# Patient Record
Sex: FEMALE | Race: BLACK OR AFRICAN AMERICAN | Employment: FULL TIME | URBAN - METROPOLITAN AREA
[De-identification: names, ages, dates, MRNs, and addresses within clinical notes are randomized per-mention and may not be internally consistent; named-entity substitution may affect disease eponyms.]

---

## 2019-01-17 ENCOUNTER — OFFICE VISIT (OUTPATIENT)
Dept: FAMILY MEDICINE | Facility: CLINIC | Age: 29
End: 2019-01-17
Payer: COMMERCIAL

## 2019-01-17 VITALS
WEIGHT: 141 LBS | OXYGEN SATURATION: 92 % | DIASTOLIC BLOOD PRESSURE: 76 MMHG | HEART RATE: 131 BPM | TEMPERATURE: 99 F | RESPIRATION RATE: 16 BRPM | SYSTOLIC BLOOD PRESSURE: 119 MMHG | BODY MASS INDEX: 22.08 KG/M2

## 2019-01-17 DIAGNOSIS — J45.41 MODERATE PERSISTENT ASTHMA WITH ACUTE EXACERBATION: ICD-10-CM

## 2019-01-17 DIAGNOSIS — J45.40 MODERATE PERSISTENT ASTHMA, UNSPECIFIED WHETHER COMPLICATED: Primary | ICD-10-CM

## 2019-01-17 DIAGNOSIS — B00.2 ORAL HERPES: ICD-10-CM

## 2019-01-17 RX ORDER — MEDROXYPROGESTERONE ACETATE 150 MG/ML
150 INJECTION, SUSPENSION INTRAMUSCULAR
COMMUNITY
Start: 2018-11-16 | End: 2020-10-04

## 2019-01-17 RX ORDER — MONTELUKAST SODIUM 10 MG/1
10 TABLET ORAL AT BEDTIME
Qty: 90 TABLET | Refills: 3 | Status: SHIPPED | OUTPATIENT
Start: 2019-01-17 | End: 2020-05-11

## 2019-01-17 RX ORDER — VALACYCLOVIR HYDROCHLORIDE 500 MG/1
500 TABLET, FILM COATED ORAL 2 TIMES DAILY
Qty: 30 TABLET | Refills: 0 | Status: SHIPPED | OUTPATIENT
Start: 2019-01-17 | End: 2019-12-06

## 2019-01-17 RX ORDER — ACETAMINOPHEN 500 MG
1000 TABLET ORAL EVERY 6 HOURS PRN
COMMUNITY
Start: 2013-03-25

## 2019-01-17 RX ORDER — ALBUTEROL SULFATE 0.83 MG/ML
2.5 SOLUTION RESPIRATORY (INHALATION)
COMMUNITY
Start: 2018-11-01 | End: 2019-12-06

## 2019-01-17 RX ORDER — ALBUTEROL SULFATE 90 UG/1
2 AEROSOL, METERED RESPIRATORY (INHALATION) EVERY 4 HOURS PRN
Qty: 2 INHALER | Refills: 1 | Status: SHIPPED | OUTPATIENT
Start: 2019-01-17 | End: 2019-12-06

## 2019-01-17 NOTE — PATIENT INSTRUCTIONS
refill on albuterol  Start new asthma medications:    QVAR inhaler, 1 puff morning and night everyday  Singulair, pill, 1 pill daily at night.      Use albuterol as you need    Prescription for filter for neb machine.     Refills on the Valcyclovir    Follow up in 6 weeks, but CALL if breathing not getting better with new medicines.

## 2019-01-18 PROBLEM — B00.2 ORAL HERPES: Status: ACTIVE | Noted: 2019-01-18

## 2019-01-18 PROBLEM — J45.41 MODERATE PERSISTENT ASTHMA WITH ACUTE EXACERBATION: Status: ACTIVE | Noted: 2019-01-18

## 2019-01-18 NOTE — PROGRESS NOTES
There are no exam notes on file for this visit.    SUBJECTIVE  Donna Larose is a 28 year old female with past medical history significant for    Patient Active Problem List   Diagnosis     OCP (oral contraceptive pills) initiation     Lung nodule     Pelvic pain in female     Migraine     Moderate persistent asthma with acute exacerbation     Oral herpes     Others present at the visit:  None    Presents for   Chief Complaint   Patient presents with     Establish Care     Pt is here to establish care today,     Asthma     Pt is here to discuss asthma.     Medication Reconciliation     Complete.      Patient presents today to establish care.  She has a past medical history of asthma, but is only ever taking albuterol for this.  She also has a history of oral herpes and is taken valacyclovir for outbreaks and for prophylaxis in the past.  She has 3 children ages 5 3 and 1, and is quite busy with them at home.  Shares that things have been challenging lately because her father of her boys has been trying to hold him out of .  She has full custody at this time.  She is worried that he might try to take them away from her.  Is working with a  on this and they are working to get a restraining order in place currently.  Understandably, is very stressed about the situation.    She reports a past medical history of asthma, previous STDs, and a family history of asthma, high blood pressure, depression and anxiety.    Review of systems is positive for muscle pain, intermittent nausea vomiting, fevers or chills, dizziness, wheezing, bad cough, and difficulty breathing.  We chose to focus on her asthma today.    She reports every day significant asthma symptoms.  Uses up to 5-6 puffs of the albuterol at a time in order to get improvement.  Also has an albuterol neb machine and finds this works better.  Has never been on a controller medication.  Used to have asthma trouble 1-2 times a year.  Triggers include  cold air, and she does think it is worse sometimes with stress.  Right now she feels wheezy, cannot catch her breath, and has been coughing significantly.  This is been bad enough that she is vomited.  Was able to get a new filter for her nebulizer machine as it was acting funny, and this is helped.  She does also endorse chronic allergy and sinus symptoms.  Does not taking medication for this.  Has tried using Benadryl in the past but this just made her sleepy.  Has never had to go to the ER or be hospitalized for her asthma.  She has been using her daughter's inhaler to help until she can get a new prescription.    Also was recently seen for an outbreak of oral herpes.  Was given 6 pills of Valtrex to take 2 pills a day for 3 days.  She is wondering if this is enough of the medication.  Has been on Valtrex in the past and often took this on a regular basis for couple of months.    OBJECTIVE:  Vitals: /76   Pulse 131   Temp 99  F (37.2  C) (Oral)   Resp 16   Wt 64 kg (141 lb)   SpO2 92%   BMI 22.08 kg/m    BMI= Body mass index is 22.08 kg/m .  Vitals:  Vitals are reviewed and are within the normal range.  Blood pressure initially was elevated.  This improved with rest.  Gen:  Alert, pleasant, no acute distress  Head:  Normal cephalic, atraumatic.  No visual herpes lesions.  Ears:  Tympanic membranes viewed bilaterally, no erythema, bulging, or fluid present  Sinus: Mild congestion bilaterally.  No frontal or maxillary sinus tenderness.  Throat:  Clear.  Non-erythematous and without exudate  Neck:  No cervical lymphadenopathy  Cardiac:  Regular rate and rhythm, no murmurs, rubs or gallops  Respiratory: Diminished air sounds with wheezes present especially on expiration.  Bilaterally in all fields.  Some scattered rhonchi as well.  Skin:  Mucous membranes moist.  No rash.   Capillary refill <2 secs.      ASSESSMENT AND PLAN:      Donna was seen today for establish care, asthma and medication  reconciliation.  New patient here to establish care.  She is having worsening asthma symptoms.  I do not think this is bad enough to require oral steroids but would like to start her on controller medications.  We will do Qvar 1 puff twice daily every day.  Also will start Singulair 1 tab daily at night to help with the allergies as well as the asthma.  Refilled her albuterol inhaler and gave her a prescription for additional filters for the nebulizer machine.    I would like her to follow-up in 2-4 weeks to make sure that the breathing is improved.  Also requested that she bring family members and if she is reporting needing an inhaler for her daughter as well.  Will complete an asthma action plan and review medications at that time.    Diagnoses and all orders for this visit:    Moderate persistent asthma, unspecified whether complicated  -     albuterol (PROAIR HFA/PROVENTIL HFA/VENTOLIN HFA) 108 (90 Base) MCG/ACT inhaler; Inhale 2 puffs into the lungs every 4 hours as needed for shortness of breath / dyspnea or wheezing  -     valACYclovir (VALTREX) 500 MG tablet; Take 1 tablet (500 mg) by mouth 2 times daily for 3 days With episodes of oral herpes  -     beclomethasone HFA (QVAR REDIHALER) 80 MCG/ACT inhaler; Inhale 1 puff into the lungs 2 times daily  -     montelukast (SINGULAIR) 10 MG tablet; Take 1 tablet (10 mg) by mouth At Bedtime  -     order for DME; Equipment being ordered: Filter for nebulizer machine.  Qty:  2      Oral herpes  -     valACYclovir (VALTREX) 500 MG tablet; Take 1 tablet (500 mg) by mouth 2 times daily for 3 days With episodes of oral herpes      Patient Instructions    refill on albuterol  Start new asthma medications:    QVAR inhaler, 1 puff morning and night everyday  Singulair, pill, 1 pill daily at night.      Use albuterol as you need    Prescription for filter for neb machine.     Refills on the Valcyclovir    Follow up in 6 weeks, but CALL if breathing not getting better  with new medicines.      Cammie Bond

## 2019-12-06 ENCOUNTER — OFFICE VISIT (OUTPATIENT)
Dept: FAMILY MEDICINE | Facility: CLINIC | Age: 29
End: 2019-12-06
Payer: COMMERCIAL

## 2019-12-06 VITALS
HEIGHT: 67 IN | RESPIRATION RATE: 16 BRPM | HEART RATE: 84 BPM | BODY MASS INDEX: 24.64 KG/M2 | DIASTOLIC BLOOD PRESSURE: 89 MMHG | TEMPERATURE: 98 F | WEIGHT: 157 LBS | SYSTOLIC BLOOD PRESSURE: 123 MMHG | OXYGEN SATURATION: 97 %

## 2019-12-06 DIAGNOSIS — B00.2 ORAL HERPES: ICD-10-CM

## 2019-12-06 DIAGNOSIS — F32.A DEPRESSION, UNSPECIFIED DEPRESSION TYPE: ICD-10-CM

## 2019-12-06 DIAGNOSIS — J45.40 MODERATE PERSISTENT ASTHMA, UNSPECIFIED WHETHER COMPLICATED: Primary | ICD-10-CM

## 2019-12-06 RX ORDER — QUETIAPINE FUMARATE 50 MG/1
50 TABLET, FILM COATED ORAL AT BEDTIME
Qty: 30 TABLET | Refills: 0 | Status: SHIPPED | OUTPATIENT
Start: 2019-12-06 | End: 2020-03-05

## 2019-12-06 RX ORDER — ALBUTEROL SULFATE 90 UG/1
2 AEROSOL, METERED RESPIRATORY (INHALATION) EVERY 4 HOURS PRN
Qty: 2 INHALER | Refills: 1 | Status: SHIPPED | OUTPATIENT
Start: 2019-12-06 | End: 2019-12-06

## 2019-12-06 RX ORDER — ALBUTEROL SULFATE 90 UG/1
2 AEROSOL, METERED RESPIRATORY (INHALATION) EVERY 4 HOURS PRN
Qty: 2 INHALER | Refills: 1 | Status: SHIPPED | OUTPATIENT
Start: 2019-12-06 | End: 2020-04-18

## 2019-12-06 RX ORDER — VALACYCLOVIR HYDROCHLORIDE 500 MG/1
500 TABLET, FILM COATED ORAL 2 TIMES DAILY
COMMUNITY
End: 2020-04-21

## 2019-12-06 RX ORDER — ALBUTEROL SULFATE 0.83 MG/ML
2.5 SOLUTION RESPIRATORY (INHALATION) EVERY 4 HOURS PRN
Qty: 30 VIAL | Refills: 3 | Status: SHIPPED | OUTPATIENT
Start: 2019-12-06 | End: 2020-04-21

## 2019-12-06 RX ORDER — VALACYCLOVIR HYDROCHLORIDE 500 MG/1
TABLET, FILM COATED ORAL
Qty: 30 TABLET | Refills: 0 | Status: SHIPPED | OUTPATIENT
Start: 2019-12-06 | End: 2020-03-04

## 2019-12-06 ASSESSMENT — MIFFLIN-ST. JEOR: SCORE: 1478.65

## 2019-12-06 NOTE — PROGRESS NOTES
[facility]         SUBJECTIVE   Donna Larose is a 29 year old female with a PMH of:  Patient Active Problem List   Diagnosis     OCP (oral contraceptive pills) initiation     Lung nodule     Pelvic pain in female     Migraine     Moderate persistent asthma with acute exacerbation     Oral herpes     presenting to clinic today regarding concerns about her mood.  She states that she has been struggling with depressed mood.  She has a cat, which she considers a emotional support animal.  She states that her mood and the mood of all of her kids improves due to having the animal around.  She is not supposed to have a cat in her apartment without a note from a doctor.  She has received several notices from her [employer] regarding the cleanliness of her unit and the presence of the cat.  They will be reinspected her unit on Monday, 12/9, and she has at risk of eviction.    She also has cockroaches and mice in her unit, which exacerbate her asthma.  She has not seen any mice since getting the cat.  Her asthma is not well controlled.  She uses albuterol every 4 hours when at home.  On days that she is working, this results in 3 albuterol nebulizers per day.  On days that she is not working, she uses at least 4 albuterol nebs per day.    She is also asking for a refill of her valacyclovir.  She used to take valacyclovir at the onset of symptoms of oral cold sores.  Her OB/GYN transition her to daily prophylaxis.  She states that she gets roughly 3 outbreaks per year.  She states that these outbreaks only occur when she is under stress.    PMH, Medications and Allergies were reviewed and updated as needed.    ROS:  General: No fevers, chills  Head: No headache  Ears: No acute change in hearing.    CV: No chest pain or palpitations.  Resp: No shortness of breath.  No cough. No hemoptysis.  GI: No nausea, vomiting, constipation, diarrhea  : No urinary pains    Patient Active Problem List  "  Diagnosis     OCP (oral contraceptive pills) initiation     Lung nodule     Pelvic pain in female     Migraine     Moderate persistent asthma with acute exacerbation     Oral herpes       Current Outpatient Medications   Medication Sig Dispense Refill     albuterol (PROAIR HFA/PROVENTIL HFA/VENTOLIN HFA) 108 (90 Base) MCG/ACT inhaler Inhale 2 puffs into the lungs every 4 hours as needed for shortness of breath / dyspnea or wheezing 2 Inhaler 1     albuterol (PROVENTIL) (2.5 MG/3ML) 0.083% neb solution Inhale 2.5 mg into the lungs       beclomethasone HFA (QVAR REDIHALER) 80 MCG/ACT inhaler Inhale 1 puff into the lungs 2 times daily 3 Inhaler 1     valACYclovir (VALTREX) 500 MG tablet Take 500 mg by mouth 2 times daily       acetaminophen (TYLENOL) 500 MG tablet Take 1,000 mg by mouth       medroxyPROGESTERone (DEPO-PROVERA) 150 MG/ML syringe Inject 150 mg into the muscle       montelukast (SINGULAIR) 10 MG tablet Take 1 tablet (10 mg) by mouth At Bedtime (Patient not taking: Reported on 12/6/2019) 90 tablet 3     order for DME Equipment being ordered: Filter for nebulizer machine.  Qty:  2 2 Device 0     valACYclovir (VALTREX) 500 MG tablet Take 1 tablet (500 mg) by mouth 2 times daily for 3 days With episodes of oral herpes 30 tablet 0            OBJECTIVE:       Vitals:   Vitals:    12/06/19 0844   BP: 123/89   BP Location: Left arm   Pulse: 84   Resp: 16   Temp: 98  F (36.7  C)   TempSrc: Oral   SpO2: 97%   Weight: 71.2 kg (157 lb)   Height: 1.7 m (5' 6.93\")     BMI: Body mass index is 24.64 kg/m .    Gen:  Well nourished and in no acute distress  HEENT: Extraocular movement intact.  Neck: Supple without lymphadenopathy  CV:  RRR  - no murmurs noted   Pulm:  CTAB, occasional end expiratory wheeze, no crackles noted, good air entry   ABD: Soft, nontender, no masses, no rebound, BS intact throughout, no hepatosplenomegaly  Extrem: No cyanosis, edema or clubbing  Psych: Depressed mood    PHQ-9 score:    PHQ-9 " SCORE 12/10/2019   PHQ-9 Total Score 13     Positively endorsed 3 out of 4 questions related to possible sarina.          ASSESSMENT and PLAN:   Donna was seen today for depression.    Diagnoses and all orders for this visit:    Moderate persistent asthma, unspecified whether complicated  -     albuterol (PROVENTIL) (2.5 MG/3ML) 0.083% neb solution; Take 1 vial (2.5 mg) by nebulization every 4 hours as needed for shortness of breath / dyspnea or wheezing  -     Legal Services Referral - [place] only  -     mometasone furoate (ASMANEX HFA) 200 MCG/ACT inhaler; Inhale 2 puffs into the lungs 2 times daily  -     albuterol (PROAIR HFA/PROVENTIL HFA/VENTOLIN HFA) 108 (90 Base) MCG/ACT inhaler; Inhale 2 puffs into the lungs every 4 hours as needed for shortness of breath / dyspnea or wheezing    Depression, unspecified depression type  -     QUEtiapine (SEROQUEL) 50 MG tablet; Take 1 tablet (50 mg) by mouth At Bedtime    Oral herpes  -     valACYclovir (VALTREX) 500 MG tablet; For 3 days with episodes of oral herpes    Return to clinic in 1 week for follow up of asthma and depression. Return sooner if develops new or worsening symptoms.    Options for treatment and/or follow-up care were reviewed with the patient was actively involved in the decision making process. Patient verbalized understanding and was in agreement with the plan.    The patient was seen by and discussed with Kyree Carmichael MD.    Litzy Sosa MD PGY2

## 2019-12-06 NOTE — PROGRESS NOTES
Preceptor Attestation:   Patient seen, evaluated and discussed with the resident. I have verified the content of the note, which accurately reflects my assessment of the patient and the plan of care.   Supervising Physician:  Kyree Carmichael MD

## 2019-12-06 NOTE — PATIENT INSTRUCTIONS
-Start taking seroquel 50 mg at bedtime  -Start taking Asmanex 2 puffs twice daily. Stop taking Qvar.  -Follow-up in 1 week to follow-up on your asthma and depression.    My Asthma Action Plan  Name: Donna Larose  YOB: 1990  Date: 12/6/2019   My doctor: Cammie Bond   My clinic:   [facility] [address] [place] [address]  [phone]    My Asthma Severity: Moderate Persistent Avoid your asthma triggers: dust mites, insects/rodents, strong odors and fumes and cold air      GREEN ZONE   Good Control    I feel good    No cough or wheeze    Can work, sleep and play without asthma symptoms       Take your asthma control medicine every day.  Take the medications listed below daily.    Asmanex  200 mcg 2 puffs twice a day    1. If exercise triggers your asthma, take your rescue medication (2 puffs of albuterol, Ventolin/Pro-Air) 15 minutes before exercise or sports, and during exercise if you have asthma symptoms.  2. Spacer to use with inhaler: If you have a spacer, make sure to use it with your inhaler.              YELLOW ZONE Getting Worse  I have ANY of these:    I do not feel good    Cough or wheeze    Chest feels tight    Wake up at night   1. Keep taking your Green Zone medications.  2. Start taking your rescue medicine (1-2 puffs of albuterol - Ventolin/Pro-Air) every 4-6 hours as needed.  3. If symptoms are not controlled with above, can take 2 puffs every 20 minutes for up to 1 hour, then continue every 4 hours if needed.   4. If you do not return to the Green Zone in 12-24 hours or you get worse, call the clinic.         RED ZONE Medical Alert - Get Help  I have ANY of these:    I feel awful    Medicine is not helping    Breathing getting harder    Trouble walking or talking    Nose opens wide to breathe       1. Take your rescue medicine NOW (6-8 puffs of albuterol - Ventolin/Pro-Air) for every 20 minutes for up to 1 hour.  2. Call your doctor NOW.  3. If you are still in  the Red Zone after 20 minutes and you have not reached your doctor:    Take your rescue medicine again (6-8 puffs of albuterol - Ventolin/Pro-Air) and    Call 911 or go to the emergency room right away    See your regular doctor within 1 weeks of an Emergency Room or Urgent Care visit for follow-up treatment.        This Asthma Action Plan provides authorization for the administration of medication described in the AAP.  YES    Electronically signed by: Litzy Sosa MD    Annual Reminders:  Meet with Asthma Educator,  Flu Shot in the Fall, Pneumonia Shot  Pharmacy:    [facility] [id number] [place] [facility] - [place], [place] - [address] [id number] - [place], [place] - [address] AT [address]    December 6, 2019   Legal Services Referral - [facility] only  Legal referral has been sent to Beba Magana from [facility].     Scan/Send demographics and referral to [email or web address]    She will review, advise, and contact the patient.     Gabby Velazquez

## 2019-12-06 NOTE — LETTER
December 6, 2019      Donna Larose  [address]  [place] [address]          To whom it may concern:    Donna Larose has depression and has a cat as a  animal to help with her symptoms.    If you have any questions, please call the clinic.          Sincerely,      Litzy Sosa MD

## 2019-12-10 ASSESSMENT — PATIENT HEALTH QUESTIONNAIRE - PHQ9: SUM OF ALL RESPONSES TO PHQ QUESTIONS 1-9: 13

## 2019-12-17 ENCOUNTER — DOCUMENTATION ONLY (OUTPATIENT)
Dept: PHARMACY | Facility: PHYSICIAN GROUP | Age: 29
End: 2019-12-17

## 2019-12-17 DIAGNOSIS — J45.41 MODERATE PERSISTENT ASTHMA WITH ACUTE EXACERBATION: Primary | ICD-10-CM

## 2019-12-17 NOTE — PROGRESS NOTES
Pharmacy Note    Received fax that Asmanex is not covered.  Pt is on a high dose ICS. A med dose ICS/LABA would be more in line with guidelines.  Recommend switch to Dulera med dose. Discussed with Dr. Almaraz, who agrees. Sent Rx to pharmacy for Dulera.    Su Conteh, Pharm.D.

## 2020-02-29 DIAGNOSIS — F32.A DEPRESSION, UNSPECIFIED DEPRESSION TYPE: ICD-10-CM

## 2020-03-05 RX ORDER — QUETIAPINE FUMARATE 50 MG/1
50 TABLET, FILM COATED ORAL AT BEDTIME
Qty: 30 TABLET | Refills: 0 | Status: SHIPPED | OUTPATIENT
Start: 2020-03-05 | End: 2020-04-29

## 2020-04-18 ENCOUNTER — TELEPHONE (OUTPATIENT)
Dept: FAMILY MEDICINE | Facility: CLINIC | Age: 30
End: 2020-04-18

## 2020-04-18 DIAGNOSIS — J45.40 MODERATE PERSISTENT ASTHMA, UNSPECIFIED WHETHER COMPLICATED: ICD-10-CM

## 2020-04-18 RX ORDER — ALBUTEROL SULFATE 90 UG/1
2 AEROSOL, METERED RESPIRATORY (INHALATION) EVERY 4 HOURS PRN
Qty: 2 INHALER | Refills: 1 | Status: SHIPPED | OUTPATIENT
Start: 2020-04-18 | End: 2020-04-21

## 2020-04-18 RX ORDER — ALBUTEROL SULFATE 90 UG/1
2 AEROSOL, METERED RESPIRATORY (INHALATION) EVERY 4 HOURS PRN
Qty: 2 INHALER | Refills: 1 | Status: SHIPPED | OUTPATIENT
Start: 2020-04-18 | End: 2020-04-18

## 2020-04-18 NOTE — TELEPHONE ENCOUNTER
Answering Service Page     Donna is a 29 y.o with a history of asthma and recent hospitalization with a TOA who is calling with shortness of breath. Patient notes that she was sent home from work yesterday because her oxygen level was low at around 84%. Reports that this was after she took an albuterol neb. She doesn't have a pulse oximeter at home, but borrowed a co-workers yesterday who also has asthma. She said she went home, used her albuterol inhaler/nebulizer several times last night, but she still woke up several times at night with difficulty breathing and sweating. She has been waking up with sweats, but doesn't have a thermometer to check her temperature. She has a had a runny nose and cough. She notes that these are worse than normal for her, but chronic issues.  She does work at a [employer]. She reports she has had no known recent sick contacts though and has not recently traveled or been around people who have traveled; however, she was hospitalized approximately 2 weeks ago. She ran out of her albuterol inhaler yesterday, but she does still have her albuterol nebulizer. She has been taking her Dulera as prescribed. She last used her nebulizer around 4 hours ago. She feels she is gasping for air more frequently for the last 3-4 days. Patient is able to speak in complete sentences on the phone, but she reports that she feels short of breath while talking. She does sound congested. She does not have a prescription for prednisone at home and this has never been prescribed in the past.     Will refill her albuterol inhaler, but discussed that there is a shortage, so alternative inhaler may need to be sent instead.     Recommended that patient be evaluated this morning due to hypoxia yesterday even after using a nebulizer at work and waking up numerous times overnight with shortness of breath. COVID-19 cannot be ruled out at this point. Patient states she needs to try to find someone to watch her children.  Due to symptoms patient needs to be evaluated, but she is speaking in complete sentences on the phone currently, so recommended that she should do another neb now and try to find someone to watch her children this morning as soon as possible, but that if her shortness of breath worsens and she feels she can't breathe, she will need to call 911 right away. She voiced understanding and agreement with this. Patient will also need asthma follow up visit in clinic.       Ya Gallegos MD, PGY2

## 2020-04-20 NOTE — TELEPHONE ENCOUNTER
Patient with multifocal pneumonia in ED on 4/18 consistent with COVID-19. Discharged from with instructions to follow up with PCP.     Spoke with patient, she continues to take her medication and is feeling a little better. Her shortness of breath has improved. She is agreeable to scheduling follow up visit this week, transferred to  for scheduling. ./LR

## 2020-04-21 ENCOUNTER — VIRTUAL VISIT (OUTPATIENT)
Dept: FAMILY MEDICINE | Facility: CLINIC | Age: 30
End: 2020-04-21
Payer: COMMERCIAL

## 2020-04-21 VITALS — WEIGHT: 155 LBS | BODY MASS INDEX: 24.91 KG/M2 | HEIGHT: 66 IN

## 2020-04-21 DIAGNOSIS — J18.9 ATYPICAL PNEUMONIA: Primary | ICD-10-CM

## 2020-04-21 DIAGNOSIS — B00.2 RECURRENT ORAL HERPES SIMPLEX: ICD-10-CM

## 2020-04-21 DIAGNOSIS — J45.40 MODERATE PERSISTENT ASTHMA, UNSPECIFIED WHETHER COMPLICATED: ICD-10-CM

## 2020-04-21 RX ORDER — ALBUTEROL SULFATE 90 UG/1
2 AEROSOL, METERED RESPIRATORY (INHALATION) EVERY 4 HOURS PRN
Qty: 2 INHALER | Refills: 1 | Status: SHIPPED | OUTPATIENT
Start: 2020-04-21 | End: 2020-05-11

## 2020-04-21 RX ORDER — ALBUTEROL SULFATE 0.83 MG/ML
2.5 SOLUTION RESPIRATORY (INHALATION) EVERY 4 HOURS PRN
Qty: 30 VIAL | Refills: 3 | Status: SHIPPED | OUTPATIENT
Start: 2020-04-21 | End: 2020-10-06

## 2020-04-21 RX ORDER — VALACYCLOVIR HYDROCHLORIDE 500 MG/1
500 TABLET, FILM COATED ORAL 2 TIMES DAILY
Qty: 90 TABLET | Refills: 1 | Status: SHIPPED | OUTPATIENT
Start: 2020-04-21 | End: 2020-10-02

## 2020-04-21 ASSESSMENT — MIFFLIN-ST. JEOR: SCORE: 1454.83

## 2020-04-21 NOTE — PROGRESS NOTES
"Family Medicine Telephone Visit Note         Telephone Visit Consent   Patient was verbally read the following and verbal consent was obtained.    \"This telephone visit will be conducted via a call between you and your physician/provider. We have found that certain health care needs can be provided without the need for a physical exam.  This service lets us provide the care you need with a short phone conversation.  If a prescription is necessary we can send it directly to your pharmacy.  If lab work is needed we can place an order for that and you can then stop by our lab to have the test done at a later time.    Telephone visits are billed at different rates depending on your insurance coverage. During this emergency period, for some insurers they may be billed the same as an in-person visit.  Please reach out to your insurance provider with any questions.    If during the course of the call the physician/provider feels a telephone visit is not appropriate, you will not be charged for this service.\"    Name person giving consent:  Patient   Date verbal consent given:  4/21/2020  Time verbal consent given:  1:51 PM    Chief Complaint   Patient presents with     other     was having SOB. Came back with negative covid-19 and had pneumonia     Refill Request     refill vijaya medication, valtrex            HPI   Patients name: Donna  Appointment start time:  1:52 PM    Patient is following up on a recent ED visit.  She developed worsening shortness of breath and cough for a few days prior to going to the ED on 4/18/20.  She was found to have a concerning CTA for pneumonia vs COVID19.  Was discharged on Augmentin x7 days.  Today, she was informed her COVID19 test was negative which she's very happy about.  States her symptoms have significantly improved and almost resolved.  Tolerating the Augmentin well, having some looser stools but not distressing.  Otherwise no fevers, chills, headache, chest pain, trouble " "breathing, productive cough, abdominal pain.  She was wheezy before and used up all of her Albuterol, but denies any current wheezing.  Hasn't needed any additional Albuterol for over a day but requesting a refill.  Also, thinks she could be having a flare of her oral herpes and requesting a refill of her Valtrex.      Current Outpatient Medications   Medication Sig Dispense Refill     acetaminophen (TYLENOL) 500 MG tablet Take 1,000 mg by mouth       albuterol (PROAIR HFA/PROVENTIL HFA/VENTOLIN HFA) 108 (90 Base) MCG/ACT inhaler Inhale 2 puffs into the lungs every 4 hours as needed for shortness of breath / dyspnea or wheezing 2 Inhaler 1     albuterol (PROVENTIL) (2.5 MG/3ML) 0.083% neb solution Take 1 vial (2.5 mg) by nebulization every 4 hours as needed for shortness of breath / dyspnea or wheezing 30 vial 3     mometasone-formoterol (DULERA) 100-5 MCG/ACT inhaler Inhale 2 puffs into the lungs 2 times daily 1 Inhaler 11     QUEtiapine (SEROQUEL) 50 MG tablet Take 1 tablet (50 mg) by mouth At Bedtime Please come in for an office visit 30 tablet 0     valACYclovir (VALTREX) 500 MG tablet Take 1 tablet (500 mg) by mouth 2 times daily 90 tablet 1     medroxyPROGESTERone (DEPO-PROVERA) 150 MG/ML syringe Inject 150 mg into the muscle       montelukast (SINGULAIR) 10 MG tablet Take 1 tablet (10 mg) by mouth At Bedtime (Patient not taking: Reported on 12/6/2019) 90 tablet 3     Allergies   Allergen Reactions     Morphine Anaphylaxis, Hives and Unknown     Mother states pt has never had but everyone in family that gets morphine they have problems with breathing  MOM and grandma both had anaphylxsis reaction so the patient would like to avoid.               Review of Systems:     See above.          Physical Exam:     Ht 1.676 m (5' 6\")   Wt 70.3 kg (155 lb)   LMP 04/08/2020   BMI 25.02 kg/m    Estimated body mass index is 25.02 kg/m  as calculated from the following:    Height as of this encounter: 1.676 m (5' 6\").    " "Weight as of this encounter: 70.3 kg (155 lb).    Exam:  Constitutional: healthy, alert and no distress  Psychiatric: mentation appears normal and affect normal/bright    COVID 19 (4/18/20): Negative     CTA Chest Imaging (4/18/20):   1.  No pulmonary embolus.    2.  Small groundglass densities in both lungs and mild focal consolidation in the right upper lobe. Commonly reported imaging features of (COVID-19) pneumonia are present. Influenza pneumonia and organizing pneumonia as can be seen in the setting of drug toxicity and connective tissue disease can causing a similar imaging pattern.    3. Multiple small bilateral lung nodules as described. Some of these are unchanged since 2017. There are 2 additional right lung nodules which are not previously imaged and measure up to 5 mm. These are likely benign inflammatory nodules.          Assessment and Plan   Donna was seen today for pneumonia and refill request.    Diagnoses and all orders for this visit:    Atypical pneumonia, resolving  COVID19 negative.  Patient feels significantly better since going to the ED and starting Augmentin.  On day 3, advised her to complete full 7 day course.  Denies any fever, chills or shortness of breath.  No other concerns.  Return precautions reviewed.  Noted to have unchanged and new small lung nodules on imaging that are \"likely benign inflammatory nodules\".      Moderate persistent asthma, unspecified whether complicated  Refilled.  No current wheezing and feeling much better.    -     albuterol (PROAIR HFA/PROVENTIL HFA/VENTOLIN HFA) 108 (90 Base) MCG/ACT inhaler; Inhale 2 puffs into the lungs every 4 hours as needed for shortness of breath / dyspnea or wheezing  -     albuterol (PROVENTIL) (2.5 MG/3ML) 0.083% neb solution; Take 1 vial (2.5 mg) by nebulization every 4 hours as needed for shortness of breath / dyspnea or wheezing    Recurrent oral herpes simplex  Refilled.  Feels like she's developing another outbreak.  Has " been on suppressive therapy which has been helpful.    -     valACYclovir (VALTREX) 500 MG tablet; Take 1 tablet (500 mg) by mouth 2 times daily    After Visit Information:  Patient declined AVS     No follow-ups on file.    Appointment end time: 2:12 PM  This is a telephone visit that took 20 minutes.    Clinician location:  [facility]    Ash Kerr MD  I precepted today with Dr. Canseco.

## 2020-04-21 NOTE — PROGRESS NOTES
I greeted the patient via telephone,  reviewed the telephone visit encounter and discussed the patient with the resident and agree with the resident s assessment and plan of care as documented.    Mauri Canseco MD

## 2020-05-06 ENCOUNTER — TELEPHONE (OUTPATIENT)
Dept: FAMILY MEDICINE | Facility: CLINIC | Age: 30
End: 2020-05-06

## 2020-05-06 NOTE — TELEPHONE ENCOUNTER
[facility] phone call message- general phone call:    Reason for call: She received a new albuterol sulfate   inhalation aerosol HFA she would like another one sent to her pharmacy [facility] on [address].  Action desired: call back.    Return call needed: Yes    OK to leave a message on voice mail? Yes    Advised patient to response may take up to 2 business days: Yes    Primary language: English      needed? No    Call taken on May 6, 2020 at 11:01 AM by Andrew Moncada

## 2020-05-11 ENCOUNTER — ALLIED HEALTH/NURSE VISIT (OUTPATIENT)
Dept: PHARMACY | Facility: PHYSICIAN GROUP | Age: 30
End: 2020-05-11
Payer: COMMERCIAL

## 2020-05-11 DIAGNOSIS — J45.40 MODERATE PERSISTENT ASTHMA, UNSPECIFIED WHETHER COMPLICATED: Primary | ICD-10-CM

## 2020-05-11 DIAGNOSIS — B00.2 ORAL HERPES: ICD-10-CM

## 2020-05-11 PROCEDURE — 99605 MTMS BY PHARM NP 15 MIN: CPT | Performed by: PHARMACIST

## 2020-05-11 PROCEDURE — 99607 MTMS BY PHARM ADDL 15 MIN: CPT | Performed by: PHARMACIST

## 2020-05-11 RX ORDER — ALBUTEROL SULFATE 90 UG/1
2 AEROSOL, METERED RESPIRATORY (INHALATION) EVERY 4 HOURS PRN
Qty: 2 INHALER | Refills: 4 | Status: SHIPPED | OUTPATIENT
Start: 2020-05-11 | End: 2020-08-19

## 2020-05-11 NOTE — PROGRESS NOTES
"MT ENCOUNTER  SUBJECTIVE/OBJECTIVE:                           Donna Larose is a 29 year old female called for an initial visit. She was referred to me from Dr Bond.      Patient consented to a telehealth visit: yes  Telemedicine Visit Details  Type of service:  Telephone visit  Start Time: 3:50pm  End Time: 4:10pm  Originating Location (pt. Location): Home  Distant Location (provider location):  [facility]  Mode of Communication:  Telephone    Chief Complaint: Needs refill of albuterol.    Allergies/ADRs: Reviewed in Epic  Tobacco:  reports that she has never smoked. She has never used smokeless tobacco.  Alcohol: none  PMH: Reviewed in Epic    Medication Adherence/Access: Confusion about medications has led to unintentional nonadherence.    Moderate persistent asthma: Current asthma medications: Short-Acting Bronchodilator: Albuterol MDI and nebs. Pt reports the following symptoms: increased need of albuterol- every day; usually about once a day  AAP on file: YES Dec 2019    We went over her inhalers. She needs a refill of her albuterol MDI. She uses this every day; usually about once per day. She also has albuterol nebs, which she rarely uses. She stated that she was not using the Dulera; she stopped it when she got the \"new yellow inhaler\". She also has a grey inhaler. I asked her to get her inhalers and read me the names of them. She then realized that the yellow and grey ones were both albuterol and she didn't realize that. She was using both the yellow and grey. She mistakenly stopped the Dulera as she thought she was supposed to use the yellow one instead of the Dulera.  She has montelukast on her list, which was prescribed over a year ago, and she said she doesn't think she ever used it and for if she did, it was a long time ago. It doesn't sound familiar to her.    Most Recent Immunizations   Administered Date(s) Administered     DTAP (<7y) 05/22/1997     HPV Quadrivalent " 06/01/2010     HepA-Adult 08/24/2009     HepA-ped 2 Dose 08/24/2009     HepB, Unspecified 10/30/1994     HepB-Adult 10/30/1994     Hib, Unspecified 05/08/1993     Historical DTP/aP 05/22/1997     Influenza (IIV3) PF 09/26/2013     MMR 05/22/1997     Meningococcal (Menactra ) 12/16/2008     Meningococcal,unspecified 12/16/2008     OPV, trivalent, live 05/22/1997     Pedvax-hib 05/08/1993     Poliovirus, inactivated (IPV) 05/22/1997     TDAP Vaccine (Adacel) 02/24/2017     Td (Adult), Adsorbed 07/09/2004       Oral herpes: On Valtrex for prophylaxis. No side effects.    Contraception: On Depo Provera (receives at [facility])    Depression: Taking Seroquel; no adverse effects    Today's Vitals: There were no vitals taken for this visit.      ASSESSMENT:                              Medication Adherence: good, confusion about her inhalers    Moderate persistent asthma: Not taking a controller x about a month due to a misunderstanding with her multiple different color albuterol inhalers. She said since they looked so different she didn't realize they were the same med.    Oral herpes: Stable    Contraception: Stable; on Depo Provera    Depression: Dd not discuss due to time      PLAN:                          Post Discharge Medication Reconciliation Status: discharge medications reconciled, continue medications without change. She had been to the ED on 4/18/20. No med changes had been made at that time.      START DULERA    Refilled albuterol inhaler    Educated pt on controller vs rescue and that albuterol is now generic and she may get different color inhalers in the future-always read the label. Made sure she understood to use the Dulera 2 puffs BID every day.      Medication issues to be addressed at a future visit      Pneumovax (PPSV 23) at next in person visit      I spent 20 minutes with this patient today. All changes were made via collaborative practice agreement with Dr. Bond. A copy  of the visit note was provided to the patient's primary care provider.    Will follow up in 2 days with Dr. Bond.    The patient was not given a summary of these recommendations.     Su Conteh, Pharm.D.

## 2020-05-13 ENCOUNTER — VIRTUAL VISIT (OUTPATIENT)
Dept: FAMILY MEDICINE | Facility: CLINIC | Age: 30
End: 2020-05-13
Payer: COMMERCIAL

## 2020-05-13 DIAGNOSIS — J45.41 MODERATE PERSISTENT ASTHMA WITH EXACERBATION: Primary | ICD-10-CM

## 2020-05-13 RX ORDER — LORATADINE 10 MG/1
10 TABLET ORAL DAILY
Qty: 90 TABLET | Refills: 3 | Status: SHIPPED | OUTPATIENT
Start: 2020-05-13 | End: 2020-08-19

## 2020-05-13 NOTE — PROGRESS NOTES
"Family Medicine Telephone Visit Note               Telephone Visit Consent   Patient was verbally read the following and verbal consent was obtained.    \"Telephone visits are billed at different rates depending on your insurance coverage. During this emergency period, for some insurers they may be billed the same as an in-person visit.  Please reach out to your insurance provider with any questions.  If during the course of the call the physician/provider feels a telephone visit is not appropriate, you will not be charged for this service.\"    Name person giving consent:  Patient   Date verbal consent given:  5/13/2020  Time verbal consent given:  1:46 PM           Chief Complaint   Patient presents with     RECHECK     follow up hosptal     other     Pt would like hndi cap sticker because she has SOB often.             HPI   Patients name: Donna  Appointment start time:  1:59 PM  Appointment end time:  2:17PM    Infection in her tubes, then had trouble with her asthma after that.  Start really feeling good about 1 week ago.  Before that was still having trouble.      Got confused about her medication.  But this is getting better.      When going to appointments has to park further down.      Can only walk two block.  Trouble going up and down stairs.  Body is feeling that way.  Only occasional gets short of breath.  Sometimes can take 15-20 min, but gets a lot better with the pump.      New pump is yellow.      Nebs help breathing too    2 puffs on the albuterol inhaler usually does the job.  Doesn't usually need more than that.      We reviewed and completed an asthma action plan together today.      Current Outpatient Medications   Medication Sig Dispense Refill     acetaminophen (TYLENOL) 500 MG tablet Take 1,000 mg by mouth       albuterol (PROAIR HFA/PROVENTIL HFA/VENTOLIN HFA) 108 (90 Base) MCG/ACT inhaler Inhale 2 puffs into the lungs every 4 hours as needed for shortness of breath / dyspnea or wheezing 2 " "Inhaler 4     albuterol (PROVENTIL) (2.5 MG/3ML) 0.083% neb solution Take 1 vial (2.5 mg) by nebulization every 4 hours as needed for shortness of breath / dyspnea or wheezing 30 vial 3     loratadine (CLARITIN) 10 MG tablet Take 1 tablet (10 mg) by mouth daily 90 tablet 3     mometasone-formoterol (DULERA) 100-5 MCG/ACT inhaler Inhale 2 puffs into the lungs 2 times daily 1 Inhaler 11     QUEtiapine (SEROQUEL) 50 MG tablet TAKE 1 TABLET BY MOUTH AT BEDTIME 90 tablet 0     valACYclovir (VALTREX) 500 MG tablet Take 1 tablet (500 mg) by mouth 2 times daily 90 tablet 1     medroxyPROGESTERone (DEPO-PROVERA) 150 MG/ML syringe Inject 150 mg into the muscle       Allergies   Allergen Reactions     Morphine Anaphylaxis, Hives and Unknown     Mother states pt has never had but everyone in family that gets morphine they have problems with breathing  MOM and grandma both had anaphylxsis reaction so the patient would like to avoid.               Physical Exam:     There were no vitals taken for this visit.  Estimated body mass index is 25.02 kg/m  as calculated from the following:    Height as of 4/21/20: 1.676 m (5' 6\").    Weight as of 4/21/20: 70.3 kg (155 lb).    Exam:  Constitutional: alert and no distress.  Breathing comfortable without distress over the phone    Psychiatric: mentation appears normal and affect normal/bright        Assessment and Plan   Donna was seen today for recheck and other.    Diagnoses and all orders for this visit:    Moderate persistent asthma with exacerbation.  Reviewed patient's medications.  She is only taking 1 puff twice daily of Dulera, so she will increase this up to 2 puffs twice daily.  Good understanding of her albuterol and uses her nebs when needed.  Has nebs at home and at work.  We also discussed her request for a handicap parking form.  She gets quite short of breath with just watch and walking 1-2 blocks.  I am concerned about this that her asthma is not well controlled, and we " discussed this.  Did complete the form for short-term, but I anticipate she should be able to walk normal distances going forward and that we will not need to complete this.  I would like her to have pulmonary function testing done once things are safe for that with COVID.  Has allergies to contribute to her asthma, so did prescribe Claritin.  Completed an asthma action plan today.  -     loratadine (CLARITIN) 10 MG tablet; Take 1 tablet (10 mg) by mouth daily      Refilled medications that would be required in the next 3 months.     After Visit Information:  Patient chose to view AVS via [employer]    Follow-up in 1 month to recheck breathing.    Appointment end time: 2:17 PM  This is a telephone visit that took 18 minutes.      Clinician location:  [facility]    Cammie Bond MD

## 2020-05-13 NOTE — PATIENT INSTRUCTIONS
My Asthma Action Plan  Name: Donna Larose  YOB: 1990  Date: 5/13/2020   My doctor: Cammie Bond   My clinic:   [facility] [address] [place] [address]  [phone]    My Asthma Severity: Moderate Persistent Avoid your asthma triggers: dust mites, pollens, mold, strong odors and fumes, occupational exposure, exercise or sports and walking, doing too much at one time, jogging, exercise      GREEN ZONE   Good Control    I feel good    No cough or wheeze    Can work, sleep and play without asthma symptoms       Take your asthma control medicine every day.  Take the medications listed below daily.    Dulera  200/5 mcg 2 puffs twice a day  Claritin daily when available    1. If exercise triggers your asthma, take your rescue medication (2 puffs of albuterol, Ventolin/Pro-Air) 15 minutes before exercise or sports, and during exercise if you have asthma symptoms.  2. Spacer to use with inhaler: If you have a spacer, make sure to use it with your inhaler.              YELLOW ZONE Getting Worse  I have ANY of these:    I do not feel good    Cough or wheeze    Chest feels tight    Wake up at night   1. Keep taking your Green Zone medications.  2. Start taking your rescue medicine (1-2 puffs of albuterol - Ventolin/Pro-Air) every 4-6 hours as needed.  3. If symptoms are not controlled with above, can take 2 puffs every 20 minutes for up to 1 hour, then continue every 4 hours if needed.   4. If you do not return to the Green Zone in 12-24 hours or you get worse, call the clinic.         RED ZONE Medical Alert - Get Help  I have ANY of these:    I feel awful    Medicine is not helping    Breathing getting harder    Trouble walking or talking    Nose opens wide to breathe       1. Take your rescue medicine NOW (6-8 puffs of albuterol - Ventolin/Pro-Air) for every 20 minutes for up to 1 hour.  2. Call your doctor NOW.  3. If you are still in the Red Zone after 20 minutes and you have not  reached your doctor:    Take your rescue medicine again (6-8 puffs of albuterol - Ventolin/Pro-Air) and    Call 911 or go to the emergency room right away    See your regular doctor within 1 weeks of an Emergency Room or Urgent Care visit for follow-up treatment.        This Asthma Action Plan provides authorization for the administration of medication described in the AAP.  YES  This child has the knowledge and skills to self-administer rescue medication at school or  with approval of the school nurse.  YES    Electronically signed by: Cammie Bond MD    Annual Reminders:  Meet with Asthma Educator,  Flu Shot in the Fall, Pneumonia Shot  Pharmacy:    [facility] [id number] [place] [facility] [place] [place] - [address] [id number] - [place], [place] - [address]

## 2020-05-14 ENCOUNTER — DOCUMENTATION ONLY (OUTPATIENT)
Dept: FAMILY MEDICINE | Facility: CLINIC | Age: 30
End: 2020-05-14

## 2020-05-14 ASSESSMENT — ASTHMA QUESTIONNAIRES: ACT_TOTALSCORE: 10

## 2020-05-14 NOTE — PROGRESS NOTES
To be completed in Nursing note:    Please reference list for forms that require a visit for completion.  Please remind patients that providers are given 3-5 business days to complete and return forms.      Form type: Medical Statement    Date form received: 5/14/2020    Date form completed by Physician: 5/18/2020    How was form returned to patient (mailed, faxed, or at  for patient to ): Fax to [employer] at [phone]    Date form mailed/faxed/left at  for patient and sent to HIM for scanning: Faxed 5/15/2020 at 2:42pm      Once form is left for patient, faxed, or mailed PCS will then close the documentation only encounter.

## 2020-05-19 ENCOUNTER — TELEPHONE (OUTPATIENT)
Dept: FAMILY MEDICINE | Facility: CLINIC | Age: 30
End: 2020-05-19

## 2020-05-19 NOTE — TELEPHONE ENCOUNTER
[facility] phone call message- general phone call:    Reason for call: She needs a all back re her handy cap parking paperwork she needs some help as to when this needs to go and she is wondering if we can just fax it to ware it needs to go    Action desired: call back.    Return call needed: Yes    OK to leave a message on voice mail? Yes    Advised patient to response may take up to 2 business days: Yes    Primary language: English      needed? No    Call taken on May 19, 2020 at 9:50 AM by Andrew Moncada

## 2020-05-20 NOTE — TELEPHONE ENCOUNTER
This SW returned the call to . She states that she has gone to the DMV but they refuse to take the application as they tell her she's at the wrong location. She is really busy and doesn't have time to run around and figure out the correct office to submit the form. She has completed her portion and PCP has completed their portion.     GURU welcomed patient to fax it to them and they will mail it for her or offered her the direct mailing address as listed on the Department of Public Safety website. She opted to mail it on her own.     SW provided patient with the following mailing address to mail the form:    [place] [employer]   and [employer]  [address] [place] [id number]      GERSON Schafer

## 2020-06-29 ENCOUNTER — TRANSFERRED RECORDS (OUTPATIENT)
Dept: HEALTH INFORMATION MANAGEMENT | Facility: CLINIC | Age: 30
End: 2020-06-29

## 2020-08-03 PROBLEM — L21.9 SEBORRHEIC DERMATITIS: Status: ACTIVE | Noted: 2020-08-03

## 2020-08-19 ENCOUNTER — OFFICE VISIT (OUTPATIENT)
Dept: FAMILY MEDICINE | Facility: CLINIC | Age: 30
End: 2020-08-19
Payer: COMMERCIAL

## 2020-08-19 ENCOUNTER — TELEPHONE (OUTPATIENT)
Dept: FAMILY MEDICINE | Facility: CLINIC | Age: 30
End: 2020-08-19

## 2020-08-19 VITALS
TEMPERATURE: 98 F | RESPIRATION RATE: 16 BRPM | OXYGEN SATURATION: 96 % | WEIGHT: 177.2 LBS | DIASTOLIC BLOOD PRESSURE: 84 MMHG | SYSTOLIC BLOOD PRESSURE: 125 MMHG | HEART RATE: 100 BPM | BODY MASS INDEX: 28.6 KG/M2

## 2020-08-19 DIAGNOSIS — Z72.51 UNPROTECTED SEX: ICD-10-CM

## 2020-08-19 DIAGNOSIS — J45.41 MODERATE PERSISTENT ASTHMA WITH ACUTE EXACERBATION: ICD-10-CM

## 2020-08-19 DIAGNOSIS — J45.40 MODERATE PERSISTENT ASTHMA, UNSPECIFIED WHETHER COMPLICATED: ICD-10-CM

## 2020-08-19 DIAGNOSIS — N93.9 ABNORMAL VAGINAL BLEEDING: Primary | ICD-10-CM

## 2020-08-19 DIAGNOSIS — R39.15 URINARY URGENCY: ICD-10-CM

## 2020-08-19 LAB
BACTERIA: NORMAL
BILIRUBIN UR: NEGATIVE MG/DL
BLOOD UR: ABNORMAL MG/DL
CASTS: NORMAL /LPF
CRYSTAL URINE: NORMAL /LPF
EPITHELIAL CELLS UR: NORMAL /LPF (ref 0–2)
GLUCOSE URINE: NEGATIVE
HCG UR QL: NEGATIVE
KETONES UR QL: NEGATIVE MG/DL
LEUKOCYTE ESTERASE UR: ABNORMAL
MUCOUS URINE: NORMAL LPF
NITRITE UR QL STRIP: NEGATIVE MG/DL
PH UR STRIP: 7 [PH] (ref 4.5–8)
PROTEIN UR: ABNORMAL MG/DL
RBC URINE: <2 /HPF
SP GR UR STRIP: 1.02 (ref 1–1.03)
UROBILINOGEN UR STRIP-ACNC: ABNORMAL E.U./DL
WBC URINE: NORMAL /HPF

## 2020-08-19 RX ORDER — ALBUTEROL SULFATE 90 UG/1
2 AEROSOL, METERED RESPIRATORY (INHALATION) EVERY 4 HOURS PRN
Qty: 2 INHALER | Refills: 4 | Status: SHIPPED | OUTPATIENT
Start: 2020-08-19 | End: 2020-10-02

## 2020-08-19 NOTE — PROGRESS NOTES
HPI     Donna Larose is a 30 year old  female with a PMH significant for:     Patient Active Problem List   Diagnosis     OCP (oral contraceptive pills) initiation     Lung nodule     Pelvic pain in female     Migraine     Moderate persistent asthma with acute exacerbation     Oral herpes     Seborrheic dermatitis     She presents today for evaluation of vaginal spotting.    Donna has had spotting between her regular menstrual cycles.  She first noticed this after she had a normal menstrual period from July through July 12.  She then experienced some light spotting from July 20-22.  She only noticed this when wiping and did not have to use a tampon or pad.  She did not have any cramping with this spotting nor changes in her abdomen girth or pain with sexual intercourse.  She has not been on any form of oral contraception since her Depo-Provera shots but it appears she was last due in April and did not go in for this at this time so it has been almost 6 months.  She then had a normal menstrual period from August 5 August 9.  She is very regular and has a normal amount of bleeding.  She does experience cramps with her menstrual cycle.  She then experienced some spotting yesterday and today (8/18 to 8/19) which prompted her to come to clinic for evaluation.     She has been having on percent protected intercourse right now and is concerned for pregnancy today even in the setting of her regular periods.  She does not have concern for sexually transmitted infections but would also like to be tested today.  She has a history of gonorrhea chlamydia infection and her last normal Pap smear was in 2018.  She does not have a family history of fibroids or any type of ovarian or uterine pathology.    She is also had 1 to 2-day history of urinary urgency.  She states that she suddenly has to go to the bathroom really bad but then when she gets to the restroom she can barely void and it hurts.  She has not had any  fevers or abdominal cramping, as above.  Intermittently nauseous.    Also requesting refills on inhalers today. Is still SOB but this is at her baseline. She is being very cautious in setting of COVID-19 as she knows she has sensitive lungs. Feels intermittently wheezy.     PMH, Medications and Allergies were reviewed and updated as needed.      Current Outpatient Medications:      acetaminophen (TYLENOL) 500 MG tablet, Take 1,000 mg by mouth, Disp: , Rfl:      albuterol (PROAIR HFA/PROVENTIL HFA/VENTOLIN HFA) 108 (90 Base) MCG/ACT inhaler, Inhale 2 puffs into the lungs every 4 hours as needed for shortness of breath / dyspnea or wheezing, Disp: 2 Inhaler, Rfl: 4     albuterol (PROVENTIL) (2.5 MG/3ML) 0.083% neb solution, Take 1 vial (2.5 mg) by nebulization every 4 hours as needed for shortness of breath / dyspnea or wheezing, Disp: 30 vial, Rfl: 3     mometasone-formoterol (DULERA) 100-5 MCG/ACT inhaler, Inhale 2 puffs into the lungs 2 times daily, Disp: 1 Inhaler, Rfl: 11     QUEtiapine (SEROQUEL) 50 MG tablet, TAKE 1 TABLET BY MOUTH AT BEDTIME, Disp: 90 tablet, Rfl: 0     valACYclovir (VALTREX) 500 MG tablet, Take 1 tablet (500 mg) by mouth 2 times daily, Disp: 90 tablet, Rfl: 1     loratadine (CLARITIN) 10 MG tablet, Take 1 tablet (10 mg) by mouth daily (Patient not taking: Reported on 8/19/2020), Disp: 90 tablet, Rfl: 3     medroxyPROGESTERone (DEPO-PROVERA) 150 MG/ML syringe, Inject 150 mg into the muscle, Disp: , Rfl:     Review Of Systems  Skin: No new rashes or lesions  Eyes: No new changes in vision, no conjunctival irritation or erythema  Ears/Nose/Throat: No rhinorrhea or nasal congestion, no throat pain  Respiratory: Shortness of breath still present secondary to asthma, unchanged from baseline  Cardiovascular: No chest pain or heart palpitations  Gastrointestinal: see HPI  Genitourinary: see HPI  Musculoskeletal: No joint edema, arthralgias or myalgias   Neurologic: No headache, tremor, numbness or  weakness  Psychiatric: Moods have been stable          Physical Exam:     Vitals:    08/19/20 0843   BP: 125/84   Pulse: 100   Resp: 16   Temp: 98  F (36.7  C)   TempSrc: Oral   SpO2: 96%   Weight: 80.4 kg (177 lb 3.2 oz)     Body mass index is 28.6 kg/m .    EXAM:  Constitutional: Healthy, alert and no distress  Neck: Neck supple. No adenopathy. Thyroid symmetric, normal size  Eyes: PERRLA, EOMI, conjunctiva are clear  ENT: No nasal discharge. TMs clear, Oropharynx clear with no erythema or exudates  Cardiovascular: Regular rate and rhythm. No murmurs, clicks gallops or rub  Respiratory: Normal respiratory rate and rhythm, lungs with faint end expiraotry wheeze  Abdomen:  +BS, soft, nontender, nondistended. No HSM. No suprapubic tenderness  Extremities: No C/C/E in BLE. 2+DP pulses.  Joint exam without erythema, effusion and full ROM throughout  Skin: No rashes or lesion on exposed skin  Psychiatric: Mentation appears normal and affect normal/bright      Assessment and Plan     Abnormal vaginal bleeding  At this time I suspect her vaginal spotting is secondary to her ovulation phase and her menstrual cycle as it is been consistently 2 weeks after her menstrual cycle.  She does not have any cramping with this, the bleeding is very light and she only notices it when wiping.  She has a normal Pap smear in 2018 so I am less concerned about some type of malignancy especially in absence of other symptoms, no family history of fibroids and she has had normal regular periods and this bleeding is very light.  Does have a history of some sexually transmitted infections and an acute history of urinary urgency but I do not think that this would fit her history as this is been going on for 2 months.  At this time we will obtain a urinalysis and STI testing.  If these are normal along with a UPT I recommend continued watchful waiting and tracking with her cell phone tomás as she has been doing.  She is interested in resuming oral  contraceptive therapy versus ductal therapy and I imagine this will help regulate her cycle again once she resumes this.  - Urinalysis, Micro If ([facility])  - [facility])    Unprotected sex  Patient would like UPT today as she has been having regular unprotected sex. Is perhaps interested in restarting Depo shots. Discussed clinic policy of 2 negative UPTs with abstinence/condom use between. Patient agreeable and will schedule follow up if she decides to pursue this.  - HCG Qualitative Urine (UPT)  ([facility])    Urinary urgency  - Urinalysis, Micro If ([facility])     Moderate persistent asthma with acute exacerbation  - mometasone-formoterol (DULERA) 100-5 MCG/ACT inhaler; Inhale 2 puffs into the lungs 2 times daily  Dispense: 1 Inhaler; Refill: 11    Moderate persistent asthma, unspecified whether complicated  - albuterol (PROAIR HFA/PROVENTIL HFA/VENTOLIN HFA) 108 (90 Base) MCG/ACT inhaler; Inhale 2 puffs into the lungs every 4 hours as needed for shortness of breath / dyspnea or wheezing  Dispense: 2 Inhaler; Refill: 4      RTC for routine health maintenance in the next 1-2 months or sooner if new or worsening symptoms.    Discussed with Dr. Latonya Anderson MD who is agreement with the assessment and plan.    Huey Arroyo MD PGY 2  [facility]      Patient Instructions   Normal menstrual bleeding lasts about five to seven days. Normal periods usually occur every 4 weeks, or 28 - 30 days. About 14 days after the start of your period, you ovulate and release an egg from the ovary. This spotting can last for one to two days and is typically light bleeding.  I recommend keeping a journal of your spotting for the next 3 months as you are already doing.    I will let you know what the test results show.    Make an appointment if/when you are ready to discuss birth control or would like to start Depo again. We need 2 pregnancy tests two weeks apart.       Options  for treatment and/or follow-up care were reviewed with the patient. Donna Larose was engaged and actively involved in the decision making process. She verbalized understanding of the options discussed and was satisfied with the final plan.

## 2020-08-19 NOTE — TELEPHONE ENCOUNTER
Spoke with patient, she reports someone had already called and told her. No further questions. ./LR

## 2020-08-19 NOTE — PATIENT INSTRUCTIONS
Normal menstrual bleeding lasts about five to seven days. Normal periods usually occur every 4 weeks, or 28 - 30 days. About 14 days after the start of your period, you ovulate and release an egg from the ovary. This spotting can last for one to two days and is typically light bleeding.  I recommend keeping a journal of your spotting for the next 3 months as you are already doing.    I will let you know what the test results show.    Make an appointment if/when you are ready to discuss birth control or would like to start Depo again. We need 2 pregnancy tests two weeks apart.

## 2020-08-19 NOTE — PROGRESS NOTES
Preceptor attestation:  Vital signs reviewed: /84   Pulse 100   Temp 98  F (36.7  C) (Oral)   Resp 16   Wt 80.4 kg (177 lb 3.2 oz)   LMP 08/05/2020   SpO2 96%   BMI 28.60 kg/m      Patient seen, evaluated, and discussed with the resident.  I have verified the content of the note, which accurately reflects my assessment of the patient and the plan of care.    Supervising physician: Latonya Anderson MD  [facility]

## 2020-08-19 NOTE — TELEPHONE ENCOUNTER
[facility] phone call message- patient requesting results:    Test: Lab    Date of test:Please 08/17/2020    Additional Comments: pt is calling for her UPT results. Please call asap.    OK to leave a message on voice mail? Yes    Primary language: English      needed? No    Call taken on August 19, 2020 at 11:01 AM by Gama Preciado

## 2020-08-21 LAB
C TRACH RRNA SPEC QL NAA+PROBE: NEGATIVE
N GONORRHOEA RRNA SPEC QL NAA+PROBE: NEGATIVE

## 2020-08-24 NOTE — RESULT ENCOUNTER NOTE
Please call patient and let her know negative chlamydia and gonorrhea results. I let her know about her UA and UPT last week. Thank you!

## 2020-09-11 ENCOUNTER — TELEPHONE (OUTPATIENT)
Dept: FAMILY MEDICINE | Facility: CLINIC | Age: 30
End: 2020-09-11

## 2020-09-11 NOTE — TELEPHONE ENCOUNTER
[facility] Medicine phone call message- general phone call:    Reason for call:     Pt's needing a new campionship letter to her landlord. She originally had a cat, but her kids were afraid of the cat so she replaced it with a dog. Needing new letter. Dog's name is Bonnie.       Action desired:     Letter / call back      Return call needed: Yes    OK to leave a message on voice mail? Yes    Advised patient to response may take up to 2 business days: Yes    Primary language: English      needed? No    Call taken on September 11, 2020 at 9:45 AM by Rachel Castaneda CMA

## 2020-09-14 ENCOUNTER — VIRTUAL VISIT (OUTPATIENT)
Dept: FAMILY MEDICINE | Facility: CLINIC | Age: 30
End: 2020-09-14
Payer: COMMERCIAL

## 2020-09-14 VITALS — BODY MASS INDEX: 28.88 KG/M2 | HEIGHT: 67 IN | WEIGHT: 184 LBS

## 2020-09-14 DIAGNOSIS — F32.A DEPRESSION, UNSPECIFIED DEPRESSION TYPE: Primary | ICD-10-CM

## 2020-09-14 ASSESSMENT — MIFFLIN-ST. JEOR: SCORE: 1596.13

## 2020-09-14 NOTE — PROGRESS NOTES
Preceptor Attestation:   I talked to the patient on the phone. I discussed the patient with the resident. I have verified the content of the note, which accurately reflects my assessment of the patient and the plan of care.   Supervising Physician:  Migel Alberts MD.

## 2020-09-14 NOTE — PROGRESS NOTES
"Family Medicine Telephone Visit Note         Telephone Visit Consent   Patient was verbally read the following and verbal consent was obtained.    \"Telephone visits are billed at different rates depending on your insurance coverage. During this emergency period, for some insurers they may be billed the same as an in-person visit.  Please reach out to your insurance provider with any questions.  If during the course of the call the physician/provider feels a telephone visit is not appropriate, you will not be charged for this service.\"    Name person giving consent:  Patient   Date verbal consent given:  9/14/2020  Time verbal consent given:  11:16 AM    Chief Complaint   Patient presents with     RECHECK     need companon letter for a dog        Due to patient being non-English speaking/uses sign language, an  was used for this visit. Only for face-to-face interpretation by an external agency, date and length of interpretation can be found on the scanned worksheet.         HPI   Patients name: Donna  Appointment start time:  11:16 AM    Pt calls in for evaluation for a doctors note for a support animal. In the place where the patient lives there are mice and roaches. In the past she had a cat which she no longer has because her children were scared of the animal. Pt was provided a letter in the past for the cat which was written stating the cat is a support animal for treatment of depression.     Pt reports she is taking Seroquel 50 mg for MDD. She follows up with Dr Bond for her depression. Pt reports she has seen a therapist for depression in the past which was unhelpful. There is not clear documentation of follow up for depressive disorder.    Patient states there is no further information she would like to provide as context for her need for a support animal.    Current Outpatient Medications   Medication Sig Dispense Refill     acetaminophen (TYLENOL) 500 MG tablet Take 1,000 mg by mouth       " "albuterol (PROAIR HFA/PROVENTIL HFA/VENTOLIN HFA) 108 (90 Base) MCG/ACT inhaler Inhale 2 puffs into the lungs every 4 hours as needed for shortness of breath / dyspnea or wheezing 2 Inhaler 4     albuterol (PROVENTIL) (2.5 MG/3ML) 0.083% neb solution Take 1 vial (2.5 mg) by nebulization every 4 hours as needed for shortness of breath / dyspnea or wheezing 30 vial 3     mometasone-formoterol (DULERA) 100-5 MCG/ACT inhaler Inhale 2 puffs into the lungs 2 times daily 1 Inhaler 11     QUEtiapine (SEROQUEL) 50 MG tablet TAKE 1 TABLET BY MOUTH AT BEDTIME 90 tablet 0     valACYclovir (VALTREX) 500 MG tablet Take 1 tablet (500 mg) by mouth 2 times daily 90 tablet 1     medroxyPROGESTERone (DEPO-PROVERA) 150 MG/ML syringe Inject 150 mg into the muscle       Allergies   Allergen Reactions     Morphine Anaphylaxis, Hives and Unknown     Mother states pt has never had but everyone in family that gets morphine they have problems with breathing  MOM and grandma both had anaphylxsis reaction so the patient would like to avoid.                 Review of Systems:     Negative         Physical Exam:     Ht 1.7 m (5' 6.93\")   Wt 83.5 kg (184 lb)   BMI 28.88 kg/m    Estimated body mass index is 28.88 kg/m  as calculated from the following:    Height as of this encounter: 1.7 m (5' 6.93\").    Weight as of this encounter: 83.5 kg (184 lb).    Exam:  Constitutional: healthy, alert and no distress  Psychiatric: mentation appears normal and affect normal/bright     Results from the last 24 hoursNo results found for this or any previous visit (from the past 24 hour(s)).        Assessment and Plan   Donna was seen today for recheck and to request a letter for a support animal.  In the past patient was written a letter for a cat which was suggested to be for the treatment of a depressive disorder.  At the same time she was started on Seroquel which she is still taking.  This previous letter was provided on 12/6/2019.  Given that I do not " know this patient and am not familiar with history of her depressive disorder, there is minimal documented follow up, and given the story of her having gotten rid of her cat within one year of a letter being written to justify its need as a support animal, and subsequently purchasing a puppy, I do not feel comfortable or qualified to justify this as a support or  animal that is medically necessary.  I will place a referral for behavioral health should patient desire to seek treatment for her depressive disorder, who can also help justify a support animal if warranted. I will also send a note to her PCP should they feel qualified to write for a support animal.     Diagnoses and all orders for this visit:      Depression, unspecified depression type  -     MENTAL HEALTH REFERRAL  -            After Visit Information:  Patient declined AVS     No follow-ups on file.    Appointment end time: 11:26 AM  This is a telephone visit that took 10 minutes.      Clinician location:  [facility]     Barrett Flood MD  I precepted today with Dr Alberts.

## 2020-09-15 ENCOUNTER — DOCUMENTATION ONLY (OUTPATIENT)
Dept: PSYCHOLOGY | Facility: CLINIC | Age: 30
End: 2020-09-15

## 2020-09-15 NOTE — PATIENT INSTRUCTIONS
09/15/20   MENTAL HEALTH REFERRAL    Mental Health referral routed to behavioral health team for recommendations. See Documentation Only encounter for more information.    Gabby Velazquez

## 2020-09-15 NOTE — PROGRESS NOTES
Behavioral Health Team,    Patient is being referred for mental health services by their provider, Dr. Flood.  Please advise if we are able to see patient for in house treatment or if a community option would be best.    Thank you,    Gabby Velazquez  9/15/2020

## 2020-09-17 NOTE — PROGRESS NOTES
Review of Dr. Flood's order and note indicates that this is a referral for psychotherapy to address depressed mood and provide potential support for support animal.  Will ask our referral team to share the following community based supports for therapy:    [facility] of [facility]  [address]   [address]  [place] [address]  [phone] (for appointments)  Fax: [phone]  COVID-19 Update 3-: ACP at both [facility], [place], operated by [facility] are taking new patients but doing all visits by telephone or video. See this website for up to date changes: [email or web address]  [facility] - [address]  [address]  [place] [address]  Phone:  [phone]  Fax: [phone]  Hours:  Monday - Friday 8:30 - 5:00pm    COVID-19 Update 3-: ACP at both [facility], [place], [facility] are taking new patients but doing all visits by telephone or video. See this website for up to date changes: [email or web address]    [facility]  [address]  [place] [address]  [phone]  COVID-19 Update 3- : Unable to reach by phone but website has the following update: In response to the COVID-19 going around we are utilizing V-See as a Telehealth alternative to in clinic visits. This does not mean our clinic is closed but that individuals may opt to utilize this service to lower risk factors of khang COVID-19. A separate consent form needs to be completed to use telehealth. See [email or web address] for details.    [facility]  [address]  [address]  [place], [place] [address]  [phone]  COVID-19 Update 03-: Due to the COVID-19 Virus, [employer] is offering only HIPPA secure Telehealth care at this time. In order to revive care, you ll need to call the clinic at [phone] and provide your email to  clinic support staff.    [address]  [place] [address]  [phone]  COVID-19 Update 3-:  [facility] and Urgent care are both operating but screening for symptoms prior to seeing anyone in person.   Only providing psychiatry services to uninsured patients at this time.    Let me know if you have questions or would like additional follow up from me.  Thanks!      Alize Rudolph, Ph.D.,     Disclaimer  The above treatment recommendations are based on consultation with the patient's primary care provider and a review of relevant information in EPIC.? I have not personally examined the patient.? All recommendations should be implemented with considerations of the patient's relevant prior history and current clinical status.  Please contact me with any questions about the care of this patient.

## 2020-09-24 NOTE — PROGRESS NOTES
09/24/20 11:45 AM- I spoke with this pt today. She did not want to discuss this referral with me as she has an appointment coming up on 10/2 with Dr. Bond to further discuss her needs that were not satisfied in the appt with Dr. Flood.     I will close this referral at this time.     Gabby Velazquez

## 2020-10-02 ENCOUNTER — OFFICE VISIT (OUTPATIENT)
Dept: FAMILY MEDICINE | Facility: CLINIC | Age: 30
End: 2020-10-02
Payer: COMMERCIAL

## 2020-10-02 VITALS
OXYGEN SATURATION: 100 % | RESPIRATION RATE: 16 BRPM | DIASTOLIC BLOOD PRESSURE: 83 MMHG | WEIGHT: 185.6 LBS | HEART RATE: 106 BPM | BODY MASS INDEX: 29.13 KG/M2 | TEMPERATURE: 98.3 F | SYSTOLIC BLOOD PRESSURE: 117 MMHG

## 2020-10-02 DIAGNOSIS — F32.A DEPRESSION, UNSPECIFIED DEPRESSION TYPE: ICD-10-CM

## 2020-10-02 DIAGNOSIS — Z65.3 PROBLEMS RELATED TO OTHER LEGAL CIRCUMSTANCES: Primary | ICD-10-CM

## 2020-10-02 DIAGNOSIS — B00.2 RECURRENT ORAL HERPES SIMPLEX: ICD-10-CM

## 2020-10-02 DIAGNOSIS — J45.41 MODERATE PERSISTENT ASTHMA WITH ACUTE EXACERBATION: ICD-10-CM

## 2020-10-02 DIAGNOSIS — J45.40 MODERATE PERSISTENT ASTHMA, UNSPECIFIED WHETHER COMPLICATED: ICD-10-CM

## 2020-10-02 PROCEDURE — 99214 OFFICE O/P EST MOD 30 MIN: CPT | Performed by: STUDENT IN AN ORGANIZED HEALTH CARE EDUCATION/TRAINING PROGRAM

## 2020-10-02 RX ORDER — ALBUTEROL SULFATE 90 UG/1
2 AEROSOL, METERED RESPIRATORY (INHALATION) EVERY 4 HOURS PRN
Qty: 2 INHALER | Refills: 4 | Status: SHIPPED | OUTPATIENT
Start: 2020-10-02 | End: 2022-03-09

## 2020-10-02 RX ORDER — VALACYCLOVIR HYDROCHLORIDE 500 MG/1
500 TABLET, FILM COATED ORAL 2 TIMES DAILY
Qty: 90 TABLET | Refills: 1 | Status: SHIPPED | OUTPATIENT
Start: 2020-10-02 | End: 2020-12-16

## 2020-10-02 NOTE — PROGRESS NOTES
There are no exam notes on file for this visit.    SUBJECTIVE  Donna Larose is a 30 year old female with past medical history significant for    Patient Active Problem List   Diagnosis     OCP (oral contraceptive pills) initiation     Lung nodule     Pelvic pain in female     Migraine     Moderate persistent asthma with acute exacerbation     Oral herpes     Seborrheic dermatitis     Others present at the visit:  None    Presents for   Chief Complaint   Patient presents with     Asthma     Pt is here to follow up on her Asthma.     Depression     Pt is here to follow up on her Depression today.     Medication Reconciliation     Complete.      Patient presents today with a couple of concerns.    First issue is concerned about depression.  She reports intermittent difficulty with depression.  This is been worse as of late because she has lost her job, and has had some challenges applying for unemployment.  They are running out of money and she is very worried about this.  She has been waiting for almost 2-month to qualify, and has been told for some reason that she cannot access unemployment.  She is interested in talking with legal about this.  Has an appeal in place, but her hearing is not until February 2021 and she is worried she will run out of money by that time.      She is also taking Seroquel regularly for her mood as well as for sleep and this seems to be helpful.  Previously, she had a kitten to help with the anxiety, the found out that her children were afraid of the side kitten.  They would have to lock themselves or the kitten in her room.  Instead, this past August they were able to get a puppy.  The kids are doing better with the dog than with the cat.  They have been around puppies before, and it is been good for the mental health of the entire family.  She is not doing therapy right now, but has participated this in the past, and endorses having some good tools to use when things are doing  worse.    Also would like to discuss her asthma today.  Is wondering if she could bring her children here for care and support and asthma.  Thinks that her daughter may also have asthma.  She reports using the Dulera every day faithfully.  Feels like it is been helpful.  She however continues to use the albuterol nearly every day.  ACT score today is 14.  She is requesting a refill on the albuterol.  Notes that her asthma in general is worse in the winter, and the recent change in weather is triggering difficulties.  Also shares that she has cockroaches in her home, and these seem to trigger her asthma as well.  Says that the landlord's been applying some type of treatment for the roaches but it is not working.  She would like help with this as well.    She reports having continued difficulties with oral herpes outbreaks.  Has been taking valacyclovir for this.  Wonders if there is more definitive treatments or any other options she can use.  We discussed that stress can trigger outbreaks, so it makes sense that she is having trouble at this time.    OBJECTIVE:  Vitals: /83 (BP Location: Left arm, Patient Position: Sitting, Cuff Size: Adult Regular)   Pulse 106   Temp 98.3  F (36.8  C) (Oral)   Resp 16   Wt 84.2 kg (185 lb 9.6 oz)   SpO2 100%   BMI 29.13 kg/m    BMI= Body mass index is 29.13 kg/m .  Objective:    Vitals:  Vitals are reviewed and are within the normal range  Gen:  Alert, pleasant, no acute distress  Cardiac:  Regular rate and rhythm, no murmurs, rubs or gallops  Respiratory:  Lungs clear to auscultation bilaterally  Abdomen:  Soft, non-tender, non-distended, bowel sounds positive  Extremities:  Warm, well-perfused, pulses 2+/4, no lower extremity edema    ASSESSMENT AND PLAN:      Donna was seen today for asthma, depression and medication reconciliation.    Diagnoses and all orders for this visit:    Problems related to other legal circumstances.  Will place a legal referral for  difficulties with unemployment and housing.  -     Legal Services Referral - [place] only    Moderate persistent asthma, unspecified whether complicated.  Worsening asthma symptoms.  Will refill albuterol, and increase the strength of Dulera to 200-5 and see if this improves control.  ACT is 14.  Should follow-up in 1 month to recheck asthma symptoms.  -     albuterol (PROAIR HFA/PROVENTIL HFA/VENTOLIN HFA) 108 (90 Base) MCG/ACT inhaler; Inhale 2 puffs into the lungs every 4 hours as needed for shortness of breath / dyspnea or wheezing  -     mometasone-formoterol (DULERA) 200-5 MCG/ACT inhaler; Inhale 2 puffs into the lungs 2 times daily    Depression, unspecified depression type.  Difficulties with depression.  Is taking Seroquel.  Is not interested in therapy right now.  I did write a letter today indicating the need for an emotional support animal.    Recurrent oral herpes simplex.  Refilled Valtrex for oral herpes.  -     valACYclovir (VALTREX) 500 MG tablet; Take 1 tablet (500 mg) by mouth 2 times daily    Follow up in 1 month for recheck asthma symptoms.      Cammie Bond MD

## 2020-10-02 NOTE — LETTER
October 2, 2020      Donna Larose  [address]  [place] [address]        Dear Karen;    My name is Cammie Bond MD and I am the primary care provider for Donna Larose.  She has a previous diagnosis of major depression and anxiety, and uses a support animal to help manage these symptoms, along with taking medications, and relaxation therapy.  Please allow her to have a support animal present in her home.        Sincerely,          Cammie Bond MD

## 2020-10-05 ENCOUNTER — TELEPHONE (OUTPATIENT)
Dept: FAMILY MEDICINE | Facility: CLINIC | Age: 30
End: 2020-10-05

## 2020-10-05 ASSESSMENT — ANXIETY QUESTIONNAIRES
5. BEING SO RESTLESS THAT IT IS HARD TO SIT STILL: MORE THAN HALF THE DAYS
7. FEELING AFRAID AS IF SOMETHING AWFUL MIGHT HAPPEN: SEVERAL DAYS
2. NOT BEING ABLE TO STOP OR CONTROL WORRYING: MORE THAN HALF THE DAYS
1. FEELING NERVOUS, ANXIOUS, OR ON EDGE: NOT AT ALL
6. BECOMING EASILY ANNOYED OR IRRITABLE: MORE THAN HALF THE DAYS
GAD7 TOTAL SCORE: 8
3. WORRYING TOO MUCH ABOUT DIFFERENT THINGS: NOT AT ALL

## 2020-10-05 ASSESSMENT — PATIENT HEALTH QUESTIONNAIRE - PHQ9
5. POOR APPETITE OR OVEREATING: SEVERAL DAYS
SUM OF ALL RESPONSES TO PHQ QUESTIONS 1-9: 5

## 2020-10-05 NOTE — TELEPHONE ENCOUNTER
[facility] Family Medicine phone call message- medication clarification/question:    Full Medication Name:     albuterol (PROVENTIL) (2.5 MG/3ML) 0.083% neb solution  Take 1 vial (2.5 mg) by nebulization every 4 hours as needed for shortness of breath / dyspnea or wheezing - Nebulization    Question:     Pt just had an appointment with Dr. Bond. She's missing the solution and will need it sent to the pharmacy. Please call pt.     Pharmacy confirmed as [id number] - [place], [place] - [address]: Yes    OK to leave a message on voice mail? Yes    Primary language: English      needed? No    Call taken on October 5, 2020 at 1:45 PM by Rachel Castaneda CMA

## 2020-10-05 NOTE — PATIENT INSTRUCTIONS
October 5, 2020   Legal Services Referral - [place] only  Legal referral has been sent to Beba Magana from [facility].     Scan/Send demographics and referral to [email or web address]    She will review, advise, and contact the patient.     Gabby Velazquez

## 2020-10-06 DIAGNOSIS — J45.40 MODERATE PERSISTENT ASTHMA, UNSPECIFIED WHETHER COMPLICATED: ICD-10-CM

## 2020-10-06 RX ORDER — ALBUTEROL SULFATE 0.83 MG/ML
2.5 SOLUTION RESPIRATORY (INHALATION) EVERY 4 HOURS PRN
Qty: 30 VIAL | Refills: 3 | Status: SHIPPED | OUTPATIENT
Start: 2020-10-06 | End: 2021-02-22

## 2020-10-06 ASSESSMENT — ASTHMA QUESTIONNAIRES: ACT_TOTALSCORE: 14

## 2020-10-06 ASSESSMENT — ANXIETY QUESTIONNAIRES: GAD7 TOTAL SCORE: 8

## 2020-11-29 ENCOUNTER — HEALTH MAINTENANCE LETTER (OUTPATIENT)
Age: 30
End: 2020-11-29

## 2020-12-04 ENCOUNTER — TELEPHONE (OUTPATIENT)
Dept: FAMILY MEDICINE | Facility: CLINIC | Age: 30
End: 2020-12-04

## 2020-12-04 NOTE — TELEPHONE ENCOUNTER
Patient reports she is late for her period. LMP 11/2-11/7, she always gets her period on time. She is also endorsing some clear discharge. Endorsing some n/v, but is still generally able to tolerate PO. No abdominal or back pain. She is sexually active and is not on birth control. She does not use condoms. Recommended she take an additional pregnancy test this weekend. If she is continuing to have n/v or if pregnancy test is positive will call the clinic Monday morning for appt.     Aware to call on call provider over the weekend with new or worsening symptoms such as worsening n/v, inability to tolerate PO, or abdominal pain. ./LR

## 2020-12-07 ENCOUNTER — VIRTUAL VISIT (OUTPATIENT)
Dept: FAMILY MEDICINE | Facility: CLINIC | Age: 30
End: 2020-12-07
Payer: COMMERCIAL

## 2020-12-07 ENCOUNTER — TELEPHONE (OUTPATIENT)
Dept: FAMILY MEDICINE | Facility: CLINIC | Age: 30
End: 2020-12-07

## 2020-12-07 VITALS — WEIGHT: 178 LBS | HEIGHT: 68 IN | BODY MASS INDEX: 26.98 KG/M2

## 2020-12-07 DIAGNOSIS — R11.0 NAUSEA: ICD-10-CM

## 2020-12-07 DIAGNOSIS — R35.0 URINARY FREQUENCY: ICD-10-CM

## 2020-12-07 DIAGNOSIS — N91.2 AMENORRHEA: ICD-10-CM

## 2020-12-07 DIAGNOSIS — N91.2 AMENORRHEA: Primary | ICD-10-CM

## 2020-12-07 DIAGNOSIS — R10.11 ABDOMINAL PRESSURE IN RIGHT UPPER QUADRANT: ICD-10-CM

## 2020-12-07 LAB
BACTERIA: NORMAL
BILIRUBIN UR: NEGATIVE MG/DL
BLOOD UR: NEGATIVE MG/DL
CASTS: NORMAL /LPF
CRYSTAL URINE: NORMAL /LPF
EPITHELIAL CELLS UR: NORMAL /LPF (ref 0–2)
GLUCOSE URINE: NEGATIVE
HCG UR QL: NEGATIVE
KETONES UR QL: NEGATIVE MG/DL
LEUKOCYTE ESTERASE UR: ABNORMAL
MUCOUS URINE: NORMAL LPF
NITRITE UR QL STRIP: NEGATIVE MG/DL
PH UR STRIP: 7.5 [PH] (ref 4.5–8)
PROTEIN UR: NEGATIVE MG/DL
RBC URINE: <2 /HPF
SP GR UR STRIP: 1.02 (ref 1–1.03)
UROBILINOGEN UR STRIP-ACNC: ABNORMAL E.U./DL
WBC URINE: NORMAL /HPF

## 2020-12-07 PROCEDURE — 99213 OFFICE O/P EST LOW 20 MIN: CPT | Mod: TEL | Performed by: FAMILY MEDICINE

## 2020-12-07 PROCEDURE — 81003 URINALYSIS AUTO W/O SCOPE: CPT

## 2020-12-07 PROCEDURE — 81025 URINE PREGNANCY TEST: CPT

## 2020-12-07 PROCEDURE — 81001 URINALYSIS AUTO W/SCOPE: CPT

## 2020-12-07 RX ORDER — PRENATAL VIT/IRON FUM/FOLIC AC 27MG-0.8MG
1 TABLET ORAL DAILY
Qty: 90 TABLET | Refills: 3 | Status: SHIPPED | OUTPATIENT
Start: 2020-12-07 | End: 2021-02-22

## 2020-12-07 ASSESSMENT — MIFFLIN-ST. JEOR: SCORE: 1585.9

## 2020-12-07 NOTE — TELEPHONE ENCOUNTER
[facility] Family Medicine phone call message- patient requesting results:    Test: Lab    Date of test: 12/07/2020    Additional Comments: NONE    OK to leave a message on voice mail? Yes    Primary language: English      needed? No    Call taken on December 7, 2020 at 3:52 PM by Gama Preciado

## 2020-12-07 NOTE — PROGRESS NOTES
"Family Medicine Telephone Visit Note           Telephone Visit Consent   Patient was verbally read the following and verbal consent was obtained.    \"Telephone visits are billed at different rates depending on your insurance coverage. During this emergency period, for some insurers they may be billed the same as an in-person visit.  Please reach out to your insurance provider with any questions.  If during the course of the call the physician/provider feels a telephone visit is not appropriate, you will not be charged for this service.\"    Name person giving consent:  Patient   Date verbal consent given:  12/7/2020  Time verbal consent given:  1:05 PM       No chief complaint on file.                  HPI   Patients name: Donna Larose  Appointment start time:  1:10 PM    Donna Larose is a 30 year old female with a medical history of abnormal uterine bleeding, chlamydia, gonorrhea, and oral herpes who presents with 1 week of amenorrhea, nausea, vomiting, vaginal discharge, and right upper quadrant pressure. She states that she has a regular cycle and has only missed her period when she was pregnant. Patient's last period was from 11/2-11/7. She is sexually active. Her last sexual encounter was on 11/19 and she is currently not on any birth control. Patient describes having 1 recent episode of vaginal discharge of clear, mucous like consistency with no foul odor. Since her missed period, she has experienced 1 episode of vomiting, nausea, and intermittent RUQ pressure. Additionally, she has increased urinary frequency and softer stool.       Current Outpatient Medications   Medication Sig Dispense Refill     acetaminophen (TYLENOL) 500 MG tablet Take 1,000 mg by mouth       albuterol (PROAIR HFA/PROVENTIL HFA/VENTOLIN HFA) 108 (90 Base) MCG/ACT inhaler Inhale 2 puffs into the lungs every 4 hours as needed for shortness of breath / dyspnea or wheezing 2 Inhaler 4     albuterol (PROVENTIL) (2.5 MG/3ML) 0.083% neb " "solution Take 1 vial (2.5 mg) by nebulization every 4 hours as needed for shortness of breath / dyspnea or wheezing 30 vial 3     mometasone-formoterol (DULERA) 200-5 MCG/ACT inhaler Inhale 2 puffs into the lungs 2 times daily 3 Inhaler 1     QUEtiapine (SEROQUEL) 50 MG tablet TAKE 1 TABLET BY MOUTH AT BEDTIME 90 tablet 0     valACYclovir (VALTREX) 500 MG tablet Take 1 tablet (500 mg) by mouth 2 times daily 90 tablet 1     Allergies   Allergen Reactions     Morphine Anaphylaxis, Hives and Unknown     Mother states pt has never had but everyone in family that gets morphine they have problems with breathing  MOM and grandma both had anaphylxsis reaction so the patient would like to avoid.                 Review of Systems:     Constitutional: Possible weight gain noted by patient, increased appetite  GI: intermittent pressure in RUQ, no abdominal pain, softer stool  : increased urinary frequency          Physical Exam:     There were no vitals taken for this visit.  Estimated body mass index is 29.13 kg/m  as calculated from the following:    Height as of 9/14/20: 1.7 m (5' 6.93\").    Weight as of 10/2/20: 84.2 kg (185 lb 9.6 oz).    Exam:  Constitutional: healthy, alert and no distress  Psychiatric: mentation appears normal and affect normal/bright            Assessment and Plan       ICD-10-CM    1. Amenorrhea  N91.2 HCG Qualitative Urine (UPT)  ([facility])     Urinalysis, Micro If ([facility])     Prenatal Vit-Fe Fumarate-FA (PRENATAL MULTIVITAMIN W/IRON) 27-0.8 MG tablet   2. Urinary frequency  R35.0 Urinalysis, Micro If ([facility])   3. Nausea  R11.0    4. Abdominal pressure in right upper quadrant  R10.11      DDX pregnancy (including tubal), anovulatory cycle (review show issue with spotting at office visit in Aug 2020.)  Did have tube-ovarian abscess 4/2020 treated at [facility].  Patient came to clinic and gave urine sample. UPT negative. UA not suspicious for infection. Will monitor for continued nausea OR RUQ " pressure sensation. If persists will call Friday Dec 11th to schedule in person office visit.  If cycles does not resume will continue first trimester precautions and test UPT q week until flow.  Called patient to review results and plan between 4pm-5pm. Conversation lasted approx. 10 minutes    After Visit Information:  Will print and mail AVS     No follow-ups on file.    Appointment end time: 1:26PM  This is a telephone visit that took 16+ 10=26 minutes.      Clinician location:  [facility]    Jennifer Montero, MS3    Preceptor Attestation:   I was present with the medical student who participated in the service and in the documentation of this note. I have verified the history and personally performed the physical exam and medical decision making. I have verified the content of the note, which accurately reflects my assessment of the patient and the plan of care.   Supervising Physician:  Jean Burkett MD.

## 2020-12-07 NOTE — TELEPHONE ENCOUNTER
"I called Donna sometime between 4:15 and 4:40pm; I believe it was after Donna spoke to Alva.    We discussed the lab findings. We discussed a plan which includes scheduling an in person office visit for additional testing Friday if 1) continued nausea OR 2) return for RUQ \"pressure\".     While Donna told the medical student and me she usually had regular monthly flow, check of the chart shows a visit in August 2020 for flow irregularity, potentially due to \"anovulatory cycle\". She was on Depo Provera in 2018, possabily late 2019.  In addition, Donna was hospitalized April 2020 at [facility] for tube-ovarian abscess. There was mention of a follow up US in 2 months, but unclear to me if that was done.    Alva: Can you reach out to Donna Tuesday Dec 8th to find out how she is doing?  1) as above, schedule an appointment by Friday Dec 11th IF continued nausea OR RUQ pressure  2) otherwise schedule an appointment with Dr. Weathers at next available to follow up on flow issues, hx tube-ovarian abscess.      Thanks!  MB  "

## 2020-12-07 NOTE — TELEPHONE ENCOUNTER
Communicated negative HCG and UA with 3+ leuks and pending micro to patient.     She feels very worried about not getting her period, as she reports she is now over a week late. She is wondering if there are additional tests which can be drawn. Routed to Dr. Burkett, Dr. Bond. ./BENJAMIN

## 2020-12-08 ASSESSMENT — ASTHMA QUESTIONNAIRES: ACT_TOTALSCORE: 13

## 2020-12-08 NOTE — TELEPHONE ENCOUNTER
Patient denies RUQ pain or nausea, but continues not have period. Scheduled for follow up w/ Dr. Bond. ./BENJAMIN

## 2020-12-09 NOTE — TELEPHONE ENCOUNTER
Message noted.  Will review and discuss with patient at visit next week.     Cammie Bond MD    Routed to Deena CUELLAR

## 2020-12-11 ENCOUNTER — TELEPHONE (OUTPATIENT)
Dept: FAMILY MEDICINE | Facility: CLINIC | Age: 30
End: 2020-12-11

## 2020-12-11 DIAGNOSIS — N91.2 AMENORRHEA: Primary | ICD-10-CM

## 2020-12-11 NOTE — TELEPHONE ENCOUNTER
[facility] phone call message- general phone call:    Reason for call: She was coming in for a f/u on no menses but she got her period and has kristopher on it for three days now does  think she should still come in or is she ok now?    Action desired: call back.    Return call needed: Yes    OK to leave a message on voice mail? Yes    Advised patient to response may take up to 2 business days: Yes    Primary language: English      needed? No    Call taken on December 11, 2020 at 9:45 AM by Andrew Moncada

## 2020-12-16 ENCOUNTER — OFFICE VISIT (OUTPATIENT)
Dept: FAMILY MEDICINE | Facility: CLINIC | Age: 30
End: 2020-12-16
Payer: COMMERCIAL

## 2020-12-16 VITALS
OXYGEN SATURATION: 97 % | HEART RATE: 94 BPM | SYSTOLIC BLOOD PRESSURE: 120 MMHG | BODY MASS INDEX: 29.32 KG/M2 | DIASTOLIC BLOOD PRESSURE: 86 MMHG | WEIGHT: 192.8 LBS | RESPIRATION RATE: 16 BRPM | TEMPERATURE: 98.4 F

## 2020-12-16 DIAGNOSIS — B00.2 RECURRENT ORAL HERPES SIMPLEX: ICD-10-CM

## 2020-12-16 DIAGNOSIS — J45.40 MODERATE PERSISTENT ASTHMA, UNSPECIFIED WHETHER COMPLICATED: ICD-10-CM

## 2020-12-16 DIAGNOSIS — N91.2 AMENORRHEA: ICD-10-CM

## 2020-12-16 LAB
FSH: 4.4 MIU/ML
HBA1C MFR BLD: 5.2 % (ref 4.1–5.7)
HCG UR QL: NEGATIVE
LH, SERUM: 4.5 MIU/ML
PROLACTIN SERPL-MCNC: 8 NG/ML (ref 0–20)
TSH SERPL DL<=0.05 MIU/L-ACNC: 0.48 UIU/ML (ref 0.3–5)

## 2020-12-16 PROCEDURE — 99214 OFFICE O/P EST MOD 30 MIN: CPT | Performed by: STUDENT IN AN ORGANIZED HEALTH CARE EDUCATION/TRAINING PROGRAM

## 2020-12-16 PROCEDURE — 83036 HEMOGLOBIN GLYCOSYLATED A1C: CPT | Performed by: STUDENT IN AN ORGANIZED HEALTH CARE EDUCATION/TRAINING PROGRAM

## 2020-12-16 PROCEDURE — 36415 COLL VENOUS BLD VENIPUNCTURE: CPT | Performed by: STUDENT IN AN ORGANIZED HEALTH CARE EDUCATION/TRAINING PROGRAM

## 2020-12-16 PROCEDURE — 81025 URINE PREGNANCY TEST: CPT | Performed by: STUDENT IN AN ORGANIZED HEALTH CARE EDUCATION/TRAINING PROGRAM

## 2020-12-16 RX ORDER — VALACYCLOVIR HYDROCHLORIDE 500 MG/1
500 TABLET, FILM COATED ORAL 2 TIMES DAILY
Qty: 90 TABLET | Refills: 1 | Status: SHIPPED | OUTPATIENT
Start: 2020-12-16 | End: 2021-02-23

## 2020-12-16 NOTE — PATIENT INSTRUCTIONS
Keep tracking you periods  Stop in lab for blood work  Talk about blood work on Friday.   Work on stabilizing that weight.  Eating less food.

## 2020-12-16 NOTE — LETTER
December 17, 2020      Donna Larose  [address]  [place] [address]        Dear ,    We are writing to inform you of your test results.    Here is a copy of your lab results.  Everything looks normal.  THis is good news.  I hope the periods settle back down to normal.  Please call the clinic at [phone] if you have any questions.       Resulted Orders   HCG Qualitative Urine (UPT)  ([facility])   Result Value Ref Range    HCG Qual Urine Negative Negative   Thyroid [place] ([facility])   Result Value Ref Range    TSH 0.48 0.30 - 5.00 uIU/mL    Narrative    Test performed by:  [facility]  [address] [place] [address]   Prolactin ([facility])   Result Value Ref Range    Prolactin 8.0 0.0 - 20.0 ng/mL    Narrative    Test performed by:  [facility]  [address] [place] [address]   FSH ([facility])   Result Value Ref Range    FSH 4.4 mIU/mL      Comment:      Females:     Prepubertal     0-10 mIU/mL    Follicular      3-20 mIU/mL    Luteal          0-12 mIU/mL    Ovulatory       9-26 mIU/mL    Postmenopausal   mIU/mL      Narrative    Test performed by:  [facility]  [address] [place] [address]   LH ([facility])   Result Value Ref Range    LH 4.5 mIU/mL    Narrative    Test performed by:  [facility]  [address] [place] [address]  Male:......................................0.6-12.1 mIU/mL  Female:  Follicular..................................1.8-11.8 mIU/mL  Mid-Cycle...................................7.6-89.1 mIU/mL  Luteal......................................0.6-14.0 mIU/mL  Postmenopausal..............................5.2-62.0 mIU/mL  Male and Female:  Prepubertal.................................1.0-3.5 mIU/mL  Prepubertal ranges from Pediatric Reference  Intervals; Deepti, Astrid Ramires and Deepti;  7th Edition; 2011    Hemoglobin A1c ([facility])   Result Value Ref Range    Hemoglobin A1C 5.2 4.1 - 5.7 %       If you have any questions or concerns, please call the clinic at the number listed above.       Sincerely,      Cammie Bond MD

## 2020-12-16 NOTE — LETTER
December 21, 2020      Donna Larose  [address]  [place] [address]        Dear ,    We are writing to inform you of your test results.    As we discussed in clinic on Friday, all of your hormone testing came back looking good.  This is good news.  Please call the clinic at [phone] if you have any questions.       Resulted Orders   HCG Qualitative Urine (UPT)  ([facility])   Result Value Ref Range    HCG Qual Urine Negative Negative   Thyroid [place] ([facility])   Result Value Ref Range    TSH 0.48 0.30 - 5.00 uIU/mL    Narrative    Test performed by:  [facility]  [address] [place] [address]   Prolactin ([facility])   Result Value Ref Range    Prolactin 8.0 0.0 - 20.0 ng/mL    Narrative    Test performed by:  [facility]  [address] [place] [address]   FSH ([facility])   Result Value Ref Range    FSH 4.4 mIU/mL      Comment:      Females:     Prepubertal     0-10 mIU/mL    Follicular      3-20 mIU/mL    Luteal          0-12 mIU/mL    Ovulatory       9-26 mIU/mL    Postmenopausal   mIU/mL      Narrative    Test performed by:  [facility]  [address] [place] [address]   LH ([facility])   Result Value Ref Range    LH 4.5 mIU/mL    Narrative    Test performed by:  [facility]  [address] [place] [address]  Male:......................................0.6-12.1 mIU/mL  Female:  Follicular..................................1.8-11.8 mIU/mL  Mid-Cycle...................................7.6-89.1 mIU/mL  Luteal......................................0.6-14.0 mIU/mL  Postmenopausal..............................5.2-62.0 mIU/mL  Male and Female:  Prepubertal.................................1.0-3.5 mIU/mL  Prepubertal ranges from Pediatric Reference  Intervals; Deepti, Astrid Ramires and Deepti;  7th Edition; 2011   DHEA Sulfate  ([facility])   Result Value Ref Range    DHEAS 212 65 - 380 ug/dL      Comment:      REFERENCE INTERVAL: DHEAS  Access complete set of age- and/or gender-specific reference   intervals for this test in the [facility] Test Directory   ([email or web address]).  Performed By: [facility]  [address] [place] [id number]  : Shayna Salazar MD      Narrative    Test performed by:  [facility]  [address] [place] [id number]   Hemoglobin A1c ([facility])   Result Value Ref Range    Hemoglobin A1C 5.2 4.1 - 5.7 %       If you have any questions or concerns, please call the clinic at the number listed above.       Sincerely,      Cammie Bond MD

## 2020-12-16 NOTE — PROGRESS NOTES
There are no exam notes on file for this visit.    SUBJECTIVE  Donna Larose is a 30 year old female with past medical history significant for    Patient Active Problem List   Diagnosis     OCP (oral contraceptive pills) initiation     Lung nodule     Pelvic pain in female     Migraine     Moderate persistent asthma with acute exacerbation     Oral herpes     Seborrheic dermatitis     Others present at the visit:  None    Presents for   Chief Complaint   Patient presents with     Amenorrhea     Pt is here to discuss her period.  Pt states she just had a period and is still spotting.      Medication Reconciliation     Complete.      Patient presents for evaluation of amenorrhea.  Patient had a menstrual period from November 2-7.  This was normal.  Normal duration, normal heaviness of flow, but following this, she did not have a menses until December 9.  Started bleeding on December 9 and is still having some spotting today.  That.  Was otherwise normal.  She is concerned because her menses have typically been regular.  She bleeds for 5 days every 28 days.  Tracks this on a fertility tomás.  Does not want to get pregnant and does not want to take medications, so she is timing her intercourse and using the rhythm method.  Is concerned about this because she does not want to be pregnant right now.  She has been worried about being pregnant, because she has noticed increased weight gain, has been eating a lot, and then missed her period.  On review, she has gained over 30 pounds since 1 year ago.  Has gained 15 pounds since August.  Pregnancy test today is negative.    She is otherwise feeling well.  No hot heat or cold intolerance.  No fevers or chills.  No change in urination.  No constipation, diarrhea, nausea, vomiting, breast tenderness, drainage from her breast, neck pain, or other symptoms today.    She would like a refill on her Symbicort.  Breathing has been much better with the increased dose.  Still has  albuterol at home.  No coughing, wheezing, fevers or chills.    OBJECTIVE:  Vitals: /86 (BP Location: Left arm, Patient Position: Sitting, Cuff Size: Adult Large)   Pulse 94   Temp 98.4  F (36.9  C) (Oral)   Resp 16   Wt 87.5 kg (192 lb 12.8 oz)   LMP 12/09/2020 (Exact Date)   SpO2 97%   Breastfeeding No   BMI 29.32 kg/m    BMI= Body mass index is 29.32 kg/m .   Vitals:  Vitals are reviewed and are within the normal range  Gen:  Alert, pleasant, no acute distress  Cardiac:  Regular rate and rhythm, no murmurs, rubs or gallops  Respiratory:  Lungs clear to auscultation bilaterally  Abdomen:  Soft, non-tender, non-distended, bowel sounds positive  Extremities:  Warm, well-perfused, pulses 2+/4, no lower extremity edema      ASSESSMENT AND PLAN:      Donna was seen today for amenorrhea and medication reconciliation.   Discussed potential reasons for irregular period.  Pregnancy test is negative.  Will check thyroid, prolactin, A1c, FSH, LH and DHEA.  Discussed that she could just have a change in her menses because of weight gain and change in fat content, as well as stress in the current environment.  Patient reports her stress levels have been better now than in the past, so she does not think this is the cause.  I will call her with these results.  We also refilled her Dulera inhaler today.    Discussed healthy diet and exercise.  She is decreasing her meal portion size, and has been successful with this.  We will work on drinking more water, as well.    Diagnoses and all orders for this visit:    Amenorrhea  -     HCG Qualitative Urine (UPT)  ([facility])  -     Thyroid [place] ([facility])  -     Prolactin ([facility])  -     FSH ([facility])  -     LH ([facility])  -     DHEA Sulfate ([facility])  -     Hemoglobin A1c ([facility])    Moderate persistent asthma, unspecified whether complicated  -     mometasone-formoterol (DULERA) 200-5 MCG/ACT inhaler; Inhale 2 puffs into the lungs 2 times  daily    Recurrent oral herpes simplex  -     valACYclovir (VALTREX) 500 MG tablet; Take 1 tablet (500 mg) by mouth 2 times daily      Patient Instructions   Keep tracking you periods  Stop in lab for blood work  Talk about blood work on Friday.   Work on stabilizing that weight.  Eating less food.        Cammie Bond MD

## 2020-12-17 LAB — DHEA-S SERPL-MCNC: 212 UG/DL (ref 65–380)

## 2020-12-17 NOTE — RESULT ENCOUNTER NOTE
Donna Larose-    Here is a copy of your lab results.  Everything looks normal.  THis is good news.  I hope the periods settle back down to normal.  Please call the clinic at [phone] if you have any questions.      Cammie Bond MD    Please send results to patient.

## 2020-12-18 DIAGNOSIS — N89.8 VAGINAL DISCHARGE: Primary | ICD-10-CM

## 2020-12-18 LAB
BACTERIA: NORMAL
CLUE CELLS: NORMAL
HIV 1+2 AB+HIV1 P24 AG SERPL QL IA: NEGATIVE
MOTILE TRICHOMONAS: NEGATIVE
ODOR: NORMAL
PH WET PREP: NORMAL (ref 3.8–4.5)
WBC WET PREP: NORMAL (ref 2–5)
YEAST: NORMAL

## 2020-12-18 PROCEDURE — 36415 COLL VENOUS BLD VENIPUNCTURE: CPT | Performed by: STUDENT IN AN ORGANIZED HEALTH CARE EDUCATION/TRAINING PROGRAM

## 2020-12-18 PROCEDURE — 87210 SMEAR WET MOUNT SALINE/INK: CPT | Performed by: STUDENT IN AN ORGANIZED HEALTH CARE EDUCATION/TRAINING PROGRAM

## 2020-12-18 NOTE — LETTER
December 24, 2020      Donna Larose  [address]   [place] [address]        Dear ,    We are writing to inform you of your test results.    All of your labs here at [facility] came back normal.  Glad you were able to chat with Bee, and glad you got treatment.  If symptoms are not better at your physical coming up, let's make sure we check in again.  Please call the clinic at [phone] if you have any questions.       Resulted Orders   Wet Prep ([facility])   Result Value Ref Range    Yeast Wet Prep None none    Motile Trichomonas Wet Prep Negative Negative    Clue Cells Wet Prep Present <20% NONE    WBC WET PREP 5-10 2 - 5    Bacteria Wet Prep Moderate None    pH Wet Prep Not performed 3.8 - 4.5    Odor Wet Prep None NONE   HIV Ag/Ab Screen [place] ([facility])   Result Value Ref Range    HIV Antigen/Antibody Negative Negative    Narrative    Test performed by:  [facility] [address] [place] [address]  Method is Abbott HIV Ag/Ab for the detection of HIV p24 antigen, HIV-1   antibodies and HIV-2 antibodies.   Syphilis Screen [place] (RPR/VDRL) ([facility])   Result Value Ref Range    Treponema Antibody (Syphilis) Negative Negative    Narrative    Test performed by:  [facility] [address] [place] [address]   Hepatitis B Surface Ag ([facility])   Result Value Ref Range    Hepatitis B Surface Antigen Negative Negative    Narrative    Test performed by:  [facility] [address] [place] [address]   Hepatitis C Antibody ([facility])   Result Value Ref Range    Hepatitis C Antibody Screen Negative Negative    Narrative    Test performed by:  [facility] [address] [place] [address]       If you have any questions or concerns, please call the clinic at the number listed above.       Sincerely,      Cammie Bond MD

## 2020-12-18 NOTE — LETTER
December 24, 2020      Donna Larose  [address]   [place] [address]        Dear ,    We are writing to inform you of your test results.    All of your labs here at [facility] came back normal.  Glad you were able to chat with Bee, and glad you got treatment.  If symptoms are not better at your physical coming up, let's make sure we check in again.  Please call the clinic at [phone] if you have any questions.       Resulted Orders   Wet Prep ([facility])   Result Value Ref Range    Yeast Wet Prep None none    Motile Trichomonas Wet Prep Negative Negative    Clue Cells Wet Prep Present <20% NONE    WBC WET PREP 5-10 2 - 5    Bacteria Wet Prep Moderate None    pH Wet Prep Not performed 3.8 - 4.5    Odor Wet Prep None NONE   HIV Ag/Ab Screen [place] ([facility])   Result Value Ref Range    HIV Antigen/Antibody Negative Negative    Narrative    Test performed by:  [facility] [address] [place] [address]  Method is Abbott HIV Ag/Ab for the detection of HIV p24 antigen, HIV-1   antibodies and HIV-2 antibodies.   Syphilis Screen [place] (RPR/VDRL) ([facility])   Result Value Ref Range    Treponema Antibody (Syphilis) Negative Negative    Narrative    Test performed by:  [facility] [address] [place] [address]   Hepatitis B Surface Ag ([facility])   Result Value Ref Range    Hepatitis B Surface Antigen Negative Negative    Narrative    Test performed by:  [facility] [address] [place] [address]   Hepatitis C Antibody ([facility])   Result Value Ref Range    Hepatitis C Antibody Screen Negative Negative    Narrative    Test performed by:  [facility] [address] [place] [address]       If you have any questions or concerns, please call the clinic at the number listed above.       Sincerely,      Cammie Bond MD

## 2020-12-18 NOTE — PROGRESS NOTES
Patient seen today with son, having recently received a phone call from her partner that he was having burning from his penis and was going in for STD testing.  She was wondering if we could do testing today.  White, with a little yellow, thick discharge.  No itching.  Some odor    Discussed shortage of swabs for GC/Chlamyida.  Will do wet prep and blood work for HIV, RPR, Hep B, Hep C.   I will call with results.  She will call if his results come back positive for GC/Chlamydia and can come in for presumptive treatment if that is the case.     Cammie Bond MD

## 2020-12-19 LAB
HBV SURFACE AG SERPL QL IA: NEGATIVE
TREPONEMA ANTIBODY (SYPHILIS): NEGATIVE

## 2020-12-20 NOTE — RESULT ENCOUNTER NOTE
Donna Larose-    As we discussed in clinic on Friday, all of your hormone testing came back looking good.  This is good news.  Please call the clinic at [phone] if you have any questions.      Cammie Bond MD    Please send results to patient.

## 2020-12-21 ENCOUNTER — TELEPHONE (OUTPATIENT)
Dept: FAMILY MEDICINE | Facility: CLINIC | Age: 30
End: 2020-12-21

## 2020-12-21 LAB — HCV AB SER QL: NEGATIVE

## 2020-12-21 NOTE — TELEPHONE ENCOUNTER
Gave pt the results.  She states that she ended up going to urgent care yesterday because she had burning with urination and noticed vaginal bleeding when she wiped.  She states that she also was having vaginal discharge and bad abd pain.  She states that they diagnosed her with a UTI and gave her abx.  She states that they symptoms are going away.  Pt states that they also treated her for GC/chlamydia since her partner tested positive with Rocephin and Azithromycin.  Told her that if symptoms return or do not resolve with abx to make an appt to see Dr Bond.  Told her to call with further questions or concerns. Pt verbalized understanding./NG

## 2020-12-21 NOTE — TELEPHONE ENCOUNTER
[facility] phone call message- patient requesting results:    Test: Lab    Date of test: 12/18/2020    Additional Comments:    Pt's needing her results from her visit with Dr. Bond.     OK to leave a message on voice mail? Yes    Primary language: English      needed? No    Call taken on December 21, 2020 at 10:01 AM by Rachel Castaneda CMA

## 2020-12-24 NOTE — RESULT ENCOUNTER NOTE
Tani Thompson-    All of your labs here at [facility] came back normal.  Glad you were able to chat with Bee, and glad you got treatment.  If symptoms are not better at your physical coming up, let's make sure we check in again.  Please call the clinic at [phone] if you have any questions.      Cammie Bond MD    Please send results to patient.    
16

## 2021-02-13 ENCOUNTER — TELEPHONE (OUTPATIENT)
Dept: FAMILY MEDICINE | Facility: CLINIC | Age: 31
End: 2021-02-13

## 2021-02-13 NOTE — TELEPHONE ENCOUNTER
[facility] Answering Service Page    Received answering service page afternoon of 2/13/2021.  Patient called regarding difficulty breathing. Has moderate persistent asthma. Has albuterol inhaler and albuterol nebulizer at home. Difficulty breathing is not responding to albuterol treatments. Doing albuterol every 4 hours. Also has chest tightness, wheezing, and cough. No fever or chills.    I discussed my recommendation that the patient present to the emergency department for immediate evaluation given that her respiratory symptoms, likely secondary to asthma exacerbation, are not responding to her home medications. Patient stated that she would like to take a nebulizer treatment now and then re-evaluate her breathing. If breathing is not improved, she stated she would go to the emergency department. I discussed my agreement with taking the nebulizer treatment ASAP. I also reiterated my recommendation that she be evaluated in the ED given her current symptoms.    Patient voiced understanding of these recommendations and was in agreement with the plan to take a nebulizer treatment and then go to the nearest emergency department. A family member will transport her by private vehicle. If her symptoms worsen she stated she would call an ambulance to bring her to the ED. All questions were answered.      Isrrael Arrington, PGY-3  [facility]  02/13/21

## 2021-02-14 ENCOUNTER — TRANSFERRED RECORDS (OUTPATIENT)
Dept: HEALTH INFORMATION MANAGEMENT | Facility: CLINIC | Age: 31
End: 2021-02-14

## 2021-02-16 ENCOUNTER — TELEPHONE (OUTPATIENT)
Dept: FAMILY MEDICINE | Facility: CLINIC | Age: 31
End: 2021-02-16

## 2021-02-16 NOTE — TELEPHONE ENCOUNTER
Noted.  Will await notification about discharge.    Latonya Anderson MD  (covering for Dr. Bond while out of office)

## 2021-02-16 NOTE — TELEPHONE ENCOUNTER
[facility] phone call message- general phone call:    Reason for call: Pt is currently in [facility] due to asthma.  They are recommending she see a lung specialist after getting released.    Return call needed: Yes    OK to leave a message on voice mail? Yes    Primary language: English      needed? No    Call taken on February 16, 2021 at 12:44 PM by Gama Preciado

## 2021-02-16 NOTE — TELEPHONE ENCOUNTER
Spoke with Donna who reports she is still at the hospital. She is starting to feel a little better but when she coughs a lot her heart rate gets very high so they are monitoring that. She will call back when she is discharged to schedule follow up. Routed to Dr. Bond. ./BENJAMIN

## 2021-02-22 ENCOUNTER — VIRTUAL VISIT (OUTPATIENT)
Dept: PHARMACY | Facility: CLINIC | Age: 31
End: 2021-02-22
Payer: COMMERCIAL

## 2021-02-22 DIAGNOSIS — R05.9 COUGH: Primary | ICD-10-CM

## 2021-02-22 DIAGNOSIS — J45.40 MODERATE PERSISTENT ASTHMA, UNSPECIFIED WHETHER COMPLICATED: ICD-10-CM

## 2021-02-22 PROCEDURE — 99207 PR NO CHARGE LOS: CPT | Mod: TEL | Performed by: PHARMACIST

## 2021-02-22 RX ORDER — IPRATROPIUM BROMIDE AND ALBUTEROL SULFATE 2.5; .5 MG/3ML; MG/3ML
3 SOLUTION RESPIRATORY (INHALATION) EVERY 6 HOURS PRN
COMMUNITY
Start: 2021-02-13 | End: 2022-03-09

## 2021-02-22 RX ORDER — FLUTICASONE PROPIONATE 50 MCG
2 SPRAY, SUSPENSION (ML) NASAL DAILY
Qty: 16 G | Refills: 3 | Status: SHIPPED | OUTPATIENT
Start: 2021-02-22 | End: 2021-05-07

## 2021-02-22 RX ORDER — MONTELUKAST SODIUM 10 MG/1
10 TABLET ORAL AT BEDTIME
Qty: 90 TABLET | Refills: 3 | Status: SHIPPED | OUTPATIENT
Start: 2021-02-22 | End: 2021-06-08

## 2021-02-22 RX ORDER — BENZONATATE 100 MG/1
100 CAPSULE ORAL 3 TIMES DAILY PRN
Qty: 90 CAPSULE | Refills: 1 | Status: SHIPPED | OUTPATIENT
Start: 2021-02-22 | End: 2023-03-29

## 2021-02-22 NOTE — PROGRESS NOTES
Medication Therapy Management (MTM) Encounter    ASSESSMENT:                            Medication Adherence/Access: See below for considerations    Moderate/severe persistent asthma: PTA patient reports a high level of albuterol use indicating poor asthma control. She has likely moved into the severe persistent asthma category since last classified. Since discharge, patient reports no recurrence of symptoms (shortness of breath, chest tightness, etc.). The 2020 NHLBI guidelines recommend ICS + LABA + LAMA for patients with severe persistent asthma. The patient was prescribed Incruse Ellipta upon discharge but it was not covered by insurance so they patient has not taken it since leaving the hospital. May benefit from addition of LAMA; 1.25mcg Spiriva is covered by [place] and indicated for asthma. She would benefit from taking Dulera twice daily regularly as prescribed.    Cold Sores: Patient reports using once daily valacyclovir to prevent cold sore outbreaks, reports that therapy is effective. Per [employer] 500 mg/day of valacyclovir can be used for preventative therapy.    Sinusitis: Fluticasone nasal spray was initiated during hospital admission, will take several weeks for maximum effect.    Pain: Patient reports no concerns or adverse effects with pain medication and endorses that medication is effective for her pain.    PLAN:                              Increase Dulera to 2 puffs twice daily; use regularly    Take montelukast (Singulair) 1 tablet every single day    Only use albuterol and Duoneb if needed if having symptoms    Start new inhaler (LAMA) at next visit (tomorrow). Consider Spiriva Respimat 1.25 mcg/act    Follow-up: In-person appointment with Dr. Gallegos tomorrow (2/23/21)    SUBJECTIVE/OBJECTIVE:                          Donna Larose is a 30 year old female called on the phone for a TCM visit. She was discharged from [facility] on 2/18/21 for asthma exacerbation.      Reason for visit:  medication reconciliation after hospital discharge.    Allergies/ADRs: Reviewed in chart  Tobacco: She reports that she has never smoked. She has never used smokeless tobacco.  Alcohol: none  Caffeine: not assessed  Activity: not assessed  Past Medical History: Reviewed in chart    Medication Adherence/Access: Patient takes medications directly from bottles.  Patient takes medications 2 time(s) per day.   Per patient, misses medication 1 times per week.   Confusion regarding medication mechanism of action and instructions resulted in unintentional non-adherence.    The following medications were added in the hospital:    Duoneb solution    Flonase nasal spray once daily (1 per nostril)    Benzonatate (using 1 capsule once daily at bedtime)    Levofloxacin 750mg once daily (almost done)    Singulair (hasn't been taking- just not every day)    Prednisone taper    Dextromethorphan-guiafenisen syrup- using once in the morning    The following medications were discontinued in the hospital:    none    The following medications had dose/frequency changes in the hospital:    none    The following medication changes made in the hospital were not implemented by the patient:    Incruse Ellipta was prescribed but never picked up; not covered    Moderate/severe persistent asthma: Current medications include Duoneb, Dulera 200-5 mcg/act, albuterol HFA, montelukast 10 mg daily. Acute medications the patient is currently taking include prednisone taper, levofloxacin 750 mg, benzonatate 100 mg, and dextromethorphan-guiafenisen syrup. Has 2 inhalers at home Dulera 200-5 mcg/act and albuterol HFA.    Before hospital: was using albuterol 2 puffs every 4 hours every single day and albuterol neb every 4 hours everday. Now, since home, only using 2 puffs about once in the morning and Duonebs nebulizer once before bed to prevent symptoms. She has had no symptoms of asthma.     Patient self-adjusted Dulera to two puffs once daily after  leaving the hospital, unclear as to why. Patient has not been taking Singulair daily, using PRN, she was unaware that it must be taken daily to be effective.     Oral Herpes: Patient has been taking valacyclovir 500 mg once daily instead of twice daily as prescribed. Patient does not report recurrence of outbreaks.    Sinusitis: Current medications include fluticasone 50 mcg/act. Patient does not report any adverse effects or concerns with medication.    Pain: Current medications include acetaminophen 1000 mg by mouth as needed. Patient reports taking one tablet every other day prior to hospitalization and no use after discharge.    Today's Vitals: There were no vitals taken for this visit.  ----------------  Post Discharge Medication Reconciliation Status: discharge medications reconciled and changed, per note/orders.    I spent 30 minutes with this patient today. All changes were made via collaborative practice agreement with Dr. Bond. A copy of the visit note was provided to the patient's primary care provider.    The patient declined a summary of these recommendations as she is coming to clinic tomorrow, will be given a summary following that appointment.     Cira Hung, P4 pharmacy student    I was present with the pharmacy student who participated in the service and in the documentation of this note. I have verified the history, personally performed the medical decision making, and have verified the content of the note, which accurately reflects my assessment of the patient and the plan of care.   Su Conteh [facility], PharmD     Telemedicine Visit Details  Type of service:  Telephone visit  Start Time: 9:15am  End Time: 9:45am  Originating Location (patient location): Home  Distant Location (provider location):  [facility]      Medication Therapy Recommendations  Moderate persistent asthma with acute exacerbation    Current Medication: albuterol (PROAIR HFA/PROVENTIL HFA/VENTOLIN  HFA) 108 (90 Base) MCG/ACT inhaler   Rationale: Incorrect administration   Recommendation: Provide Education   Status: Accepted per CPA          Current Medication: mometasone-formoterol (DULERA) 200-5 MCG/ACT inhaler   Rationale: Dose too low - Dosage too low - Effectiveness   Recommendation: Increase Dose   Status: Accepted per CPA          Current Medication: montelukast (SINGULAIR) 10 MG tablet   Rationale: Incorrect administration   Recommendation: Provide Education   Status: Accepted per CPA

## 2021-02-23 ENCOUNTER — OFFICE VISIT (OUTPATIENT)
Dept: FAMILY MEDICINE | Facility: CLINIC | Age: 31
End: 2021-02-23
Payer: COMMERCIAL

## 2021-02-23 VITALS
OXYGEN SATURATION: 98 % | RESPIRATION RATE: 16 BRPM | TEMPERATURE: 98.5 F | HEART RATE: 99 BPM | BODY MASS INDEX: 30.01 KG/M2 | SYSTOLIC BLOOD PRESSURE: 133 MMHG | DIASTOLIC BLOOD PRESSURE: 89 MMHG | WEIGHT: 197.4 LBS

## 2021-02-23 DIAGNOSIS — J45.40 MODERATE PERSISTENT ASTHMA, UNSPECIFIED WHETHER COMPLICATED: Primary | ICD-10-CM

## 2021-02-23 DIAGNOSIS — B00.2 RECURRENT ORAL HERPES SIMPLEX: ICD-10-CM

## 2021-02-23 PROCEDURE — 99214 OFFICE O/P EST MOD 30 MIN: CPT | Mod: GC | Performed by: STUDENT IN AN ORGANIZED HEALTH CARE EDUCATION/TRAINING PROGRAM

## 2021-02-23 RX ORDER — VALACYCLOVIR HYDROCHLORIDE 500 MG/1
500 TABLET, FILM COATED ORAL DAILY
Qty: 90 TABLET | Refills: 1 | Status: SHIPPED | OUTPATIENT
Start: 2021-02-23 | End: 2022-03-09

## 2021-02-23 NOTE — PATIENT INSTRUCTIONS
My Asthma Action Plan    Name: Donna Larose   YOB: 1990  Date: 2/23/2021   My doctor: Ya Gallegos MD   My clinic: [facility]        My Control Medicine: Mometasone furoate + formoterol (Dulera) -  200/5 mcg Two puffs twice daily  Tiotropium bromide (Spiriva Respimat) -  2.5 mcg two puffs daily   Montelukast (Singulair) -  10 mg rasheed  My Rescue Medicine: Albuterol (Proair/Ventolin/Proventil HFA) 2-4 puffs EVERY 4 HOURS as needed. Use a spacer if recommended by your provider.  Albuterol and Ipratropium (Duoneb) nebulizer solution q4h as needed   My Asthma Severity:   Moderate Persistent  Know your asthma triggers: cold air               GREEN ZONE   Good Control    I feel good    No cough or wheeze    Can work, sleep and play without asthma symptoms       Take your asthma control medicine every day.     1. If exercise triggers your asthma, take your rescue medication    15 minutes before exercise or sports, and    During exercise if you have asthma symptoms  2. Spacer to use with inhaler: If you have a spacer, make sure to use it with your inhaler             YELLOW ZONE Getting Worse  I have ANY of these:    I do not feel good    Cough or wheeze    Chest feels tight    Wake up at night   1. Keep taking your Green Zone medications  2. Start taking your rescue medicine:    every 20 minutes for up to 1 hour. Then every 4 hours for 24-48 hours.  3. If you stay in the Yellow Zone for more than 12-24 hours, contact your doctor.  4. If you do not return to the Green Zone in 12-24 hours or you get worse, start taking your oral steroid medicine if prescribed by your provider.           RED ZONE Medical Alert - Get Help  I have ANY of these:    I feel awful    Medicine is not helping    Breathing getting harder    Trouble walking or talking    Nose opens wide to breathe       1. Take your rescue medicine NOW  2. If your provider has prescribed an oral steroid medicine, start  taking it NOW  3. Call your doctor NOW  4. If you are still in the Red Zone after 20 minutes and you have not reached your doctor:    Take your rescue medicine again and    Call 911 or go to the emergency room right away    See your regular doctor within 2 weeks of an Emergency Room or Urgent Care visit for follow-up treatment.          Annual Reminders:  Meet with Asthma Educator,  Flu Shot in the Fall, consider Pneumonia Vaccination for patients with asthma (aged 19 and older).    Pharmacy: [id number] - [place], [place] - [address] AT [facility] [address]    Electronically signed by Ya Gallegos MD   Date: 02/23/21                      Asthma Triggers  How To Control Things That Make Your Asthma Worse    Triggers are things that make your asthma worse.  Look at the list below to help you find your triggers and what you can do about them.  You can help prevent asthma flare-ups by staying away from your triggers.      Trigger                                                          What you can do   Cigarette Smoke  Tobacco smoke can make asthma worse. Do not allow smoking in your home, car or around you.  Be sure no one smokes at a child s day care or school.  If you smoke, ask your health care provider for ways to help you quit.  Ask family members to quit too.  Ask your health care provider for a referral to Quit Plan to help you quit smoking, or call 2-883-752-PLAN.     Colds, Flu, Bronchitis  These are common triggers of asthma. Wash your hands often.  Don t touch your eyes, nose or mouth.  Get a flu shot every year.     Dust Mites  These are tiny bugs that live in cloth or carpet. They are too small to see. Wash sheets and blankets in hot water every week.   Encase pillows and mattress in dust mite proof covers.  Avoid having carpet if you can. If you have carpet, vacuum weekly.   Use a dust mask and HEPA vacuum.   Pollen and Outdoor Mold  Some people are allergic to trees,  grass, or weed pollen, or molds. Try to keep your windows closed.  Limit time out doors when pollen count is high.   Ask you health care provider about taking medicine during allergy season.     Animal Dander  Some people are allergic to skin flakes, urine or saliva from pets with fur or feathers. Keep pets with fur or feathers out of your home.    If you can t keep the pet outdoors, then keep the pet out of your bedroom.  Keep the bedroom door closed.  Keep pets off cloth furniture and away from stuffed toys.     Mice, Rats, and Cockroaches   Some people are allergic to the waste from these pests.   Cover food and garbage.  Clean up spills and food crumbs.  Store grease in the refrigerator.   Keep food out of the bedroom.   Indoor Mold  This can be a trigger if your home has high moisture. Fix leaking faucets, pipes, or other sources of water.   Clean moldy surfaces.  Dehumidify basement if it is damp and smelly.   Smoke, Strong Odors, and Sprays  These can reduce air quality. Stay away from strong odors and sprays, such as perfume, powder, hair spray, paints, smoke incense, paint, cleaning products, candles and new carpet.   Exercise or Sports  Some people with asthma have this trigger. Be active!  Ask your doctor about taking medicine before sports or exercise to prevent symptoms.    Warm up for 5-10 minutes before and after sports or exercise.     Other Triggers of Asthma  Cold air:  Cover your nose and mouth with a scarf.  Sometimes laughing or crying can be a trigger.  Some medicines and food can trigger asthma.

## 2021-02-23 NOTE — PROGRESS NOTES
Preceptor Attestation:    I discussed the patient with the resident and evaluated the patient in person. I have verified the content of the note, which accurately reflects my assessment of the patient and the plan of care.   Supervising Physician:  Mauri Canseco MD.

## 2021-02-23 NOTE — PROGRESS NOTES
Hospitalization Follow-up Visit         HPI       Hospital Follow-up Visit:    Hospital:  [facility]   Date of Admission: 2/14/21  Date of Discharge: 2/18/21  Reason(s) for Admission: Asthma Exacerbation               Problems taking medications regularly:  None       Post Discharge Medication Reconciliation: discharge medications reconciled and changed, per note/orders. Discharged on prednisone taper until 3/5/21,  Fluticasone, monteleukast, tessalon and incruse ellipta (not covered by insurance) as well as PTA medications        Problems adhering to non-medication therapy:  None       Medications reviewed by: by PharmD and myself    Summary of hospitalization:  CareEverywhere information obtained and reviewed  Diagnostic Tests/Treatments reviewed.  Follow up needed: Follow up with pulmonology (appt already scheduled)  Other Healthcare Providers Involved in Patient s Care:         Pulmonology  Update since discharge: improved. Reports she is feeling much better. Hasn't needed rescue inhaler in 2 days. She is taking Dulera 2 puffs 2x daily now after meeting with PharmD yesterday. No significant cough or shortness of breath. Already scheduled with pulmonology. Wondering about getting parking permit extended. Feels asthma flares mostly related to the cold weather.  Plan of care communicated with patient                   Review of Systems:   CONSTITUTIONAL: no fatigue, no unexpected change in weight  SKIN: no worrisome rashes, no worrisome moles, no worrisome lesions  EYES: no acute vision problems or changes  ENT: no ear problems, no mouth problems, no throat problems  RESP: no significant cough, no shortness of breath  CV: no chest pain, no palpitations, no new or worsening peripheral edema  GI: no nausea, no vomiting, no constipation, no diarrhea            Physical Exam:     Vitals:    02/23/21 0838   BP: 133/89   BP Location: Left arm   Patient Position: Sitting   Cuff Size: Adult Regular   Pulse: 99   Resp: 16    Temp: 98.5  F (36.9  C)   TempSrc: Oral   SpO2: 98%   Weight: 89.5 kg (197 lb 6.4 oz)     Body mass index is 30.01 kg/m .    GENERAL: healthy, alert, well nourished, well hydrated, no distress  HENT: ear canals- normal; Nose- normal; Mouth- no ulcers, no lesions  NECK: no tenderness, no adenopathy, no asymmetry  RESP: lungs clear to auscultation - no rales, no rhonchi, no wheezes  CV: regular rates and rhythm, normal S1 S2, no S3 or S4 and no murmur, no click or rub -  ABDOMEN: soft, no tenderness         Results:   Results from the last 24 hours No results found for this or any previous visit (from the past 24 hour(s)).    Assessment and Plan      Donna was seen today for hospital f/u.    Diagnoses and all orders for this visit:    Moderate persistent asthma, unspecified whether complicated  Patient seen today for TCM after admission at [facility] for asthma exacerbation.  She is now taking her Dulera correctly after previously taking once daily.  She is also using Singulair, Flonase, Tessalon as needed, albuterol and DuoNeb's as needed.  Plan was to start Incruse on discharge but this is not covered by her insurance.  Spiriva is covered so was sent to the pharmacy today.  Instructed on using 2 puffs daily and printed out patient instructions on use as well as a link to a video.  Pulmonology follow-up already scheduled for patient.  She will remain on steroid taper until 5 March and will complete her antibiotics as prescribed.  Requesting disability parking permit and will discuss this further with her PCP.  Asthma action plan completed today.  Red flag symptoms warranting urgent follow-up discussed with patient.  -     tiotropium (SPIRIVA RESPIMAT) 2.5 MCG/ACT inhaler; Inhale 2 puffs into the lungs daily    Recurrent oral herpes simplex  Refill requested today and sent to the pharmacy. Is taking daily, unless active flares, then using twice daily  -     valACYclovir (VALTREX) 500 MG tablet; Take 1 tablet  (500 mg) by mouth daily        E&M code to be billed if TCM cannot be: 10837    Type of decision making: Moderate complexity (11160)      Options for treatment and follow-up care were reviewed with the patient  Donna Larose   engaged in the decision making process and verbalized understanding of the options discussed and agreed with the final plan.        Patient discussed with Dr. Mauri Canseco who agrees with the above assessment and plan.     Ya Gallegos MD, PGY3  [facility]

## 2021-02-24 ASSESSMENT — ASTHMA QUESTIONNAIRES: ACT_TOTALSCORE: 8

## 2021-03-12 ENCOUNTER — TELEPHONE (OUTPATIENT)
Dept: FAMILY MEDICINE | Facility: CLINIC | Age: 31
End: 2021-03-12

## 2021-03-12 NOTE — TELEPHONE ENCOUNTER
[facility] phone call message- patient requesting a refill:    Full Medication Name: prednisone.    Dose: 20 mg     Pharmacy confirmed as   [id number] - [place], [place] - [address] AT [address]  [address]  [place] [address]  Phone: [phone] Fax: [phone]  : Yes    Additional Comments: the pt is still having breathing problems      OK to leave a message on voice mail? Yes    Primary language: English      needed? No    Call taken on March 12, 2021 at 3:38 PM by Garcia Callejas

## 2021-03-15 ENCOUNTER — OFFICE VISIT (OUTPATIENT)
Dept: FAMILY MEDICINE | Facility: CLINIC | Age: 31
End: 2021-03-15
Payer: COMMERCIAL

## 2021-03-15 VITALS
TEMPERATURE: 98 F | HEART RATE: 94 BPM | OXYGEN SATURATION: 96 % | DIASTOLIC BLOOD PRESSURE: 83 MMHG | SYSTOLIC BLOOD PRESSURE: 131 MMHG | RESPIRATION RATE: 16 BRPM

## 2021-03-15 DIAGNOSIS — J45.40 MODERATE PERSISTENT ASTHMA, UNSPECIFIED WHETHER COMPLICATED: Primary | ICD-10-CM

## 2021-03-15 PROCEDURE — 99214 OFFICE O/P EST MOD 30 MIN: CPT | Mod: GC | Performed by: STUDENT IN AN ORGANIZED HEALTH CARE EDUCATION/TRAINING PROGRAM

## 2021-03-15 RX ORDER — PREDNISONE 20 MG/1
40 TABLET ORAL DAILY
Qty: 10 TABLET | Refills: 0 | Status: SHIPPED | OUTPATIENT
Start: 2021-03-15 | End: 2021-03-20

## 2021-03-15 NOTE — PROGRESS NOTES
"Assessment & Plan     Moderate persistent asthma, unspecified whether complicated  - predniSONE (DELTASONE) 20 MG tablet; Take 2 tablets (40 mg) by mouth daily for 5 days    Recommended that she start montelukast.  She will call with questions regarding possible side effects if she is concerned about starting this.  Provided an emergency prescription for prednisone if she does experiencing worsening short of breath when she is out of town.  Discussed that if she does use this prescription, she should follow-up in person in clinic next week.  She does have an appointment with pulmonology on 3/24/2021.  Also of note, her Aspergillus testing from her hospitalization was negative.  Her IgE level was elevated to 582.  In the past, she has had exposure to cockroaches and mice in her rental home, and a legal referral was placed.  This could be further discussed at follow-up, because environmental allergens could be making her asthma worse especially in the setting of the elevated IgE.    Review of prior external note(s) from - CareEverywhere information from [facility] reviewed  Review of the result(s) of each unique test - immunoglobulins, aspergillus IgE, aspergillus IgG     BMI:   Estimated body mass index is 30.01 kg/m  as calculated from the following:    Height as of 12/7/20: 1.727 m (5' 8\").    Weight as of 2/23/21: 89.5 kg (197 lb 6.4 oz).   Weight management plan: Patient was referred to their PCP to discuss a diet and exercise plan.    Return in about 1 week (around 3/22/2021) for Follow up, in person.    Litzy Sosa MD PGY3  [facility]    Chun Thompson is a 30 year old who presents for the following health issues     HPI   She is following up regarding her asthma.  She was recently hospitalized 2/14/2021 to 2/18/2021.  She received IV Solu-Medrol.  Pulmonology was consulted, and she also received Levaquin for bronchiectasis.  She was discharged with a prednisone taper (20 mg " tablets: 3 tablets daily for 3 days, then 2 tablets daily for 3 days, then 1 tablet daily for 3 days, then 1/2 tablet daily for 6 days).  She was scheduled to complete this taper on 3/5/2021.  She noted that her breathing worsened after she stopped the prednisone.  She had 5 tablets of 20 mg prednisone from a previous ED visit and took 1 tablet daily for an additional 5 days.  She has been taking Dulera 2 puffs twice daily and Spiriva 2 puffs daily since her TCM follow-up on 2/23/2021.  She also notes feeling short of breath approximately twice every day and has been using albuterol twice daily when she feels short of breath.  She is traveling over the weekend and is concerned that she may need prednisone while she is out of town.  She has not started montelukast, because she read the drug pamphlet insert with side effects and did not feel comfortable taking the medication.  She cannot recall which side effects she was concerned about as she read the drug insert when the medication was prescribed a month ago.    Review of Systems   Constitutional, HEENT, cardiovascular, pulmonary, gi and gu systems are negative, except as otherwise noted.      Objective    /83 (BP Location: Left arm, Patient Position: Sitting, Cuff Size: Adult Large)   Pulse 94   Temp 98  F (36.7  C) (Oral)   Resp 16   LMP 03/10/2021 (Exact Date)   SpO2 96%   Breastfeeding No   There is no height or weight on file to calculate BMI.  Physical Exam   GENERAL: healthy, alert and no distress  NECK: no adenopathy, no asymmetry, masses, or scars and thyroid normal to palpation  RESP: lungs clear to auscultation - no rales, rhonchi or wheezes  CV: regular rate and rhythm, normal S1 S2, no S3 or S4, no murmur, click or rub, no peripheral edema and peripheral pulses strong  ABDOMEN: soft, nontender, no hepatosplenomegaly, no masses and bowel sounds normal  MS: no gross musculoskeletal defects noted, no edema

## 2021-03-15 NOTE — PROGRESS NOTES
Preceptor Attestation:    I discussed the patient with the resident and evaluated the patient in person. I have verified the content of the note, which accurately reflects my assessment of the patient and the plan of care.   Supervising Physician:  Emmett Phillip MD.

## 2021-04-09 ENCOUNTER — TELEPHONE (OUTPATIENT)
Dept: FAMILY MEDICINE | Facility: CLINIC | Age: 31
End: 2021-04-09

## 2021-04-09 NOTE — TELEPHONE ENCOUNTER
Patient verbalized understanding and is scheduled for next week for a phone visit as she has finals all week.  Amanda Carlos, CMA

## 2021-04-09 NOTE — TELEPHONE ENCOUNTER
[facility] phone call message- general phone call:    Reason for call: She needs call back back from tristan Nichols desired: call back.      Return call needed: Yes    OK to leave a message on voice mail? Yes    Advised patient to response may take up to 2 business days: Yes    Primary language: English      needed? No    Call taken on April 9, 2021 at 3:14 PM by Andrew Moncada

## 2021-04-09 NOTE — TELEPHONE ENCOUNTER
Pt needs a letter stating that she is diagnosed with PTSD and cannot serve her Jury Duty.  Amanda Carlos, CMA

## 2021-04-14 ENCOUNTER — VIRTUAL VISIT (OUTPATIENT)
Dept: FAMILY MEDICINE | Facility: CLINIC | Age: 31
End: 2021-04-14
Payer: COMMERCIAL

## 2021-04-14 DIAGNOSIS — F43.10 PTSD (POST-TRAUMATIC STRESS DISORDER): ICD-10-CM

## 2021-04-14 DIAGNOSIS — F33.41 RECURRENT MAJOR DEPRESSIVE DISORDER, IN PARTIAL REMISSION (H): ICD-10-CM

## 2021-04-14 PROCEDURE — 99213 OFFICE O/P EST LOW 20 MIN: CPT | Mod: TEL | Performed by: STUDENT IN AN ORGANIZED HEALTH CARE EDUCATION/TRAINING PROGRAM

## 2021-04-14 NOTE — LETTER
April 14, 2021      Donna Larose  [address]   [place] [address]        To whom it may concern:    Donna Larose is a patient of mine here at [facility].  She has the following chronic medical problems.:    Patient Active Problem List   Diagnosis     OCP (oral contraceptive pills) initiation     Lung nodule     Pelvic pain in female     Migraine     Moderate persistent asthma with acute exacerbation     Oral herpes     Seborrheic dermatitis     Recurrent major depressive disorder, in partial remission (H)     PTSD (post-traumatic stress disorder)     I am writing to request that this patient be exempt from completing jury duty.  She has a history of major depression and PTSD, which is currently under moderately good control.  I have concerns that exposure to the court system and potential violent testimony could trigger and worsen her underlying symptoms.  Due to potential for harm to her and asking her be removed from this requirement.  Please call the clinic at [phone] if you have any questions.        Sincerely,        Cammie Bond MD
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
## 2021-04-26 ENCOUNTER — TELEPHONE (OUTPATIENT)
Dept: FAMILY MEDICINE | Facility: CLINIC | Age: 31
End: 2021-04-26

## 2021-04-26 NOTE — TELEPHONE ENCOUNTER
Patient called in today to let us know that she took the left over Predisone she found laying around when she felt like she was having an asthma exaserbation.  She states she took two on Saturday and one on Sunday.  She states she still has two left.    Amanda Carlos, [facility]

## 2021-04-26 NOTE — TELEPHONE ENCOUNTER
Message noted.  If she is having symptoms that are bad enough that she is needing prednisone, we should get her back in for an asthma follow up visit to discuss adjustment to her asthma medications.     Can you call and help her schedule?    Cammie Bond MD    Routed to TRANG Patel

## 2021-05-07 ENCOUNTER — VIRTUAL VISIT (OUTPATIENT)
Dept: FAMILY MEDICINE | Facility: CLINIC | Age: 31
End: 2021-05-07
Payer: COMMERCIAL

## 2021-05-07 DIAGNOSIS — J45.40 MODERATE PERSISTENT ASTHMA, UNSPECIFIED WHETHER COMPLICATED: ICD-10-CM

## 2021-05-07 PROCEDURE — 99214 OFFICE O/P EST MOD 30 MIN: CPT | Mod: TEL | Performed by: STUDENT IN AN ORGANIZED HEALTH CARE EDUCATION/TRAINING PROGRAM

## 2021-05-07 RX ORDER — FLUTICASONE PROPIONATE 50 MCG
2 SPRAY, SUSPENSION (ML) NASAL DAILY
Qty: 16 G | Refills: 3 | Status: SHIPPED | OUTPATIENT
Start: 2021-05-07 | End: 2022-03-09

## 2021-05-07 RX ORDER — PREDNISONE 20 MG/1
TABLET ORAL
Qty: 10 TABLET | Refills: 0 | Status: SHIPPED | OUTPATIENT
Start: 2021-05-07 | End: 2021-06-08

## 2021-05-07 RX ORDER — LORATADINE 10 MG/1
10 TABLET ORAL 2 TIMES DAILY
Qty: 60 TABLET | Refills: 1 | Status: SHIPPED | OUTPATIENT
Start: 2021-05-07 | End: 2021-06-08

## 2021-05-07 NOTE — PROGRESS NOTES
There are no exam notes on file for this visit.    SUBJECTIVE  Donna Larose is a 30 year old female with past medical history significant for    Patient Active Problem List   Diagnosis     OCP (oral contraceptive pills) initiation     Lung nodule     Pelvic pain in female     Migraine     Moderate persistent asthma with acute exacerbation     Oral herpes     Seborrheic dermatitis     Recurrent major depressive disorder, in partial remission (H)     PTSD (post-traumatic stress disorder)     Others present at the visit:  None    I spent 17 minutes on the phone with patient.      Presents for   Chief Complaint   Patient presents with     Asthma     Pt needs refills on her asthma medication - She would like more Prednisone for her cough.      Patient presents for discussion of follow-up on asthma medications.  Specifically, she is requested a refill on prednisone, so wanted to check in on how her breathing has been lately.    She notes coughing, wheezing, gasping for air at times, more frequently recently..  Seems like its doing the same thing that she did last time, when she needed to go into the ER and was placed on prednisone.  Found the prednisone that she was given for emergencies and tried taking that.  It helped.  Had like 4-5 pills and took all of them.  20mg pills.  Two at the beginning and then just took one.  Still having trouble.  Moving around is worse.  Has been worse with the changing weather.      Just start working a new job.  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  Feels grateful to have a job and finds that she is feeling better knowing she will have money to help pay the bills.  Was trying for a job that would utilize her new degree (medical ),, but none were available in this is working in the meantime.    She has had worsening symptoms of her allergies.  Would like a refill on the Loratadine.      Using the  dulera 1x per day, 2 puffs.    Using the spiriva 1x per day, 2 puffs  Using tessalon 4x per day and has been helping.  Using her regular albuterol inhaler, 1x per day, 2 puffs.  It helps when she needs it, if she is gasping for air.  Won't make everything better.      Has not been using nebs lately.  Will take that if she is waking up in the night and can't breathe.  Happening 2x per day.  Has the machine plugged in right by her bed.  Wakes up gasping for air.  Can be scary.  Is connected to her allergies.      Has trouble in the apartment with her allergies--really bad.  Not as bad when she is at work.  Is fine throughout the day at work.  Has roaches and mice in the apartment.  Working on saving money so she can get out of her current housing situation.  Working on trying to buy a house.  Needs a job, and to be working atleast 30 days.      She has talked to the landlord and contacted the health department.  The maintenance mariela then called to check in.  Unfortunately, there have not been any significant changes and there is still difficulty with roaches and mice in the unit.  She feels like the only option will be to move, and is hoping with her new job she will be able to do that.    OBJECTIVE:  Vitals: There were no vitals taken for this visit.  BMI= There is no height or weight on file to calculate BMI.  Objective:    Vitals: Unable to obtain due to virtual visit.  Gen:  Alert, pleasant, no acute distress  Respiratory: Speaking comfortably in full complete sentences with no evidence of respiratory distress.      ASSESSMENT AND PLAN:      Donna was seen today for asthma.  We reviewed her current asthma medications and plan.  Discussed that prednisone can cause long-term side effects so well its important to have to prevent hospitalizations and ER visits, we do not want her taking it regularly.  We reviewed all of her inhalers, and discussed the timings of each.  I think more regular use of these will help.   Reminded her about her nebulizer, and that breathing is getting worse she should increase using this.  Additionally we will more aggressively treat her allergies, including twice daily Claritin, once daily Flonase, and will continue to work to get her alternative housing options, as this is exacerbating her symptoms.  I did provide prednisone to use on an as-needed basis with severe exacerbations, but discussed that she should call us when taking the medication, if her breathing is bad enough that she needs it.    Diagnoses and all orders for this visit:    Moderate persistent asthma, unspecified whether complicated  -     loratadine (CLARITIN) 10 MG tablet; Take 1 tablet (10 mg) by mouth 2 times daily  -     fluticasone (FLONASE) 50 MCG/ACT nasal spray; Spray 2 sprays into both nostrils daily  -     predniSONE (DELTASONE) 20 MG tablet; Take 1-2 pills as needed with asthma exacerbations.  Please call clinic if needing to take the medication    We will have patient follow-up in 1 month to recheck asthma symptoms and ensure that her asthma regimen is adequate.    Cammie Bond MD

## 2021-06-08 ENCOUNTER — VIRTUAL VISIT (OUTPATIENT)
Dept: FAMILY MEDICINE | Facility: CLINIC | Age: 31
End: 2021-06-08
Payer: COMMERCIAL

## 2021-06-08 DIAGNOSIS — J45.40 MODERATE PERSISTENT ASTHMA, UNSPECIFIED WHETHER COMPLICATED: ICD-10-CM

## 2021-06-08 DIAGNOSIS — J30.2 SEASONAL ALLERGIC RHINITIS, UNSPECIFIED TRIGGER: Primary | ICD-10-CM

## 2021-06-08 PROCEDURE — 99213 OFFICE O/P EST LOW 20 MIN: CPT | Mod: TEL | Performed by: STUDENT IN AN ORGANIZED HEALTH CARE EDUCATION/TRAINING PROGRAM

## 2021-06-08 RX ORDER — PREDNISONE 20 MG/1
TABLET ORAL
Qty: 10 TABLET | Refills: 1 | Status: SHIPPED | OUTPATIENT
Start: 2021-06-08 | End: 2021-06-30

## 2021-06-08 RX ORDER — LORATADINE 10 MG/1
10 TABLET ORAL DAILY
Qty: 90 TABLET | Refills: 1 | Status: SHIPPED | OUTPATIENT
Start: 2021-06-08 | End: 2022-03-09

## 2021-06-08 NOTE — LETTER
June 8, 2021      Donna Larose  [address]   [place] [address]        Dear Employer;    Donna Larose is a patient here at [facility] in [place].  She has moderate persistent asthma, and because of this needs to use regular inhalers.  She also has a rescue inhaler that she takes as needed when her breathing symptoms worsen.  There are a number of things that can trigger her asthma, including hot weather, dust and mold, allergies, and viral infection.  Please allow her to take a break as needed to use her inhaler if she becomes short of breath.  She may need to take a break and step away from work for a few minutes to take her medication and treat her breathing.  We continue to work on improving her medication regimen to make sure she is able to breath well and engage in normal daily activities, including work.  Please call the clinic at [phone] if you have any questions.        Sincerely,        Cammie Bond MD

## 2021-06-08 NOTE — PROGRESS NOTES
Donna is a 30 year old who is being evaluated via a billable telephone visit.      What phone number would you like to be contacted at? [phone]  How would you like to obtain your AVS? Mail a copy    Donna was seen today for asthma follow up.  Per history, breathing is improved, however, does sound like she had a recent exacerbation.  We reviewed her asthma medications, affirmed her appropriate use of prednisone, and sent in refills.  Will complete a full 5 day course of prednisone for this exacerbation, as well as continuing normal controls.  I wrote a note for work, indicating the need for inhaler use.  Will check in in 4-6 weeks to reassess symptoms and if further controller medications are needed.      Diagnoses and all orders for this visit:    Seasonal allergic rhinitis, unspecified trigger    Moderate persistent asthma, unspecified whether complicated  -     predniSONE (DELTASONE) 20 MG tablet; Take 1-2 pills as needed with asthma exacerbations.  Please call clinic if needing to take the medication  -     loratadine (CLARITIN) 10 MG tablet; Take 1 tablet (10 mg) by mouth daily    Cammie Bond MD      Subjective   Donna is a 30 year old who presents for the following health issues:  Asthma follow up visit.      HPI     Overall, her breathing has been improved.  Doing well.  Taking the Spiriva, 1 puff 2x day, rescue as needed. Dulera. 2 puffs 2x per day.  Still using flonase daily--really notices if she misses a day with this.  And she is taking loratidine for allergies.      Still having occasional episodes where she has trouble breathing.   Most recently started 2 days ago.  Was a work yesterday, and breathing started to get worse.  Was able to get through the shift, went home and took a prednisone, and felt a little bit better.  Took an additional pill before bed.  Woke up feeling great.  Took a third pill this morning.  Still coughing, but feeling much less short of breath.      Has been  drinking more water lately too.  Had more trouble with the heat.  Where she works, it is hot as well.  Has air conditioning, but doesn't seem to work very well. The heat makes it harder to breath.  Needs a letter for the employer to let them know.      Still at [employer].  Mostly the weather has been bothering her breathing at work.  Still has difficulty with allergies from within the home.  Better with the allergy medications.   Has been working with the landlord and working with an agent on alternative housing.  Has been saving money in case other plans fall through as she wants to move for her health and her breathing.        We discussed the COVID-19 vaccine.  She is very worried about long term side effects because it was rolled out so quickly, and would like to wait a couple of years to see if there are complications before she gets the vaccine.  She continues to wear a mask at work, with going out, and is working on limiting her number of contacts.          Objective           Vitals:  No vitals were obtained today due to virtual visit.    Physical Exam   healthy, alert and no distress  PSYCH: Alert and oriented times 3; coherent speech, normal   rate and volume, able to articulate logical thoughts, able   to abstract reason, no tangential thoughts, no hallucinations   or delusions  Her affect is normal  RESP: No cough, no audible wheezing, able to talk in full sentences  Remainder of exam unable to be completed due to telephone visits    ACT score is 15.       Phone call duration: 14 minutes

## 2021-06-09 ASSESSMENT — ASTHMA QUESTIONNAIRES: ACT_TOTALSCORE: 15

## 2021-06-23 ENCOUNTER — TELEPHONE (OUTPATIENT)
Dept: FAMILY MEDICINE | Facility: CLINIC | Age: 31
End: 2021-06-23

## 2021-06-23 NOTE — TELEPHONE ENCOUNTER
[facility] phone call message- general phone call:    Reason for call: the Pt called to ask a question and would like a call back     Return call needed: Yes    OK to leave a message on voice mail? Yes    Primary language: English      needed? No    Call taken on June 23, 2021 at 9:02 AM by Garcia Callejas

## 2021-06-30 ENCOUNTER — DOCUMENTATION ONLY (OUTPATIENT)
Dept: FAMILY MEDICINE | Facility: CLINIC | Age: 31
End: 2021-06-30

## 2021-06-30 ENCOUNTER — OFFICE VISIT (OUTPATIENT)
Dept: FAMILY MEDICINE | Facility: CLINIC | Age: 31
End: 2021-06-30
Payer: COMMERCIAL

## 2021-06-30 VITALS
OXYGEN SATURATION: 97 % | HEART RATE: 62 BPM | BODY MASS INDEX: 33.18 KG/M2 | SYSTOLIC BLOOD PRESSURE: 127 MMHG | RESPIRATION RATE: 16 BRPM | TEMPERATURE: 97.8 F | WEIGHT: 218.2 LBS | DIASTOLIC BLOOD PRESSURE: 87 MMHG

## 2021-06-30 DIAGNOSIS — F33.41 RECURRENT MAJOR DEPRESSIVE DISORDER, IN PARTIAL REMISSION (H): ICD-10-CM

## 2021-06-30 DIAGNOSIS — J30.2 SEASONAL ALLERGIC RHINITIS, UNSPECIFIED TRIGGER: Primary | ICD-10-CM

## 2021-06-30 DIAGNOSIS — J45.40 MODERATE PERSISTENT ASTHMA, UNSPECIFIED WHETHER COMPLICATED: ICD-10-CM

## 2021-06-30 PROCEDURE — 99214 OFFICE O/P EST MOD 30 MIN: CPT | Performed by: STUDENT IN AN ORGANIZED HEALTH CARE EDUCATION/TRAINING PROGRAM

## 2021-06-30 RX ORDER — PREDNISONE 20 MG/1
TABLET ORAL
Qty: 10 TABLET | Refills: 1 | Status: SHIPPED | OUTPATIENT
Start: 2021-06-30 | End: 2022-03-09

## 2021-06-30 RX ORDER — TIOTROPIUM BROMIDE INHALATION SPRAY 3.12 UG/1
2 SPRAY, METERED RESPIRATORY (INHALATION) DAILY
Qty: 4 G | Refills: 3 | Status: SHIPPED | OUTPATIENT
Start: 2021-06-30 | End: 2022-03-09

## 2021-06-30 NOTE — PROGRESS NOTES
To be completed in Nursing note:    Please reference list for forms that require a visit for completion.  Please remind patients that providers are given 3-5 business days to complete and return forms.      Form type: [employer]/ Application for Disability Parking Certification     Date form received: 06/30/2021    Date form completed by Physician:06/30/2021    How was form returned to patient (mailed, faxed, or at  for patient to ): Mail to patient 06/30/21    Date form mailed/faxed/left at  for patient and sent to HIM for scanning: Make a copy for Medical Record to be scan 06/30/21      Once form is left for patient, faxed, or mailed PCS will then close the documentation only encounter.

## 2021-06-30 NOTE — Clinical Note
Sorry-- note is not done, but I got a start on the information about her housing troubles.  She is open to you calling this afternoon to talk more.  Thank you!

## 2021-06-30 NOTE — PATIENT INSTRUCTIONS
Petty will call to talk about housing this afternoon.  We will send in the parking forms today. iF you don't hear anything in 2 weeks, call us and we will resend them for you.     Take prednisone, 1 pill per day for next 4 days.  Dr. Bond has sent in another prescription.    Try to  your spiriva inhaler.  If you can't pick it up, tell the pharmacy to call [place] and we will see if there is an alternative.      Follow up in 1 month.
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
## 2021-07-01 ASSESSMENT — ASTHMA QUESTIONNAIRES: ACT_TOTALSCORE: 18

## 2021-07-06 ENCOUNTER — TELEPHONE (OUTPATIENT)
Dept: FAMILY MEDICINE | Facility: CLINIC | Age: 31
End: 2021-07-06

## 2021-07-06 DIAGNOSIS — J45.40 MODERATE PERSISTENT ASTHMA, UNSPECIFIED WHETHER COMPLICATED: ICD-10-CM

## 2021-07-06 NOTE — TELEPHONE ENCOUNTER
Patient states that she was told by Dr. Bond that if the inhaler she prescribed needed a PA that she was to be notified.  The Spiriva is not covered under patient's insurance.  PA is required.  Please advise.  Amanda Carlos, CMA

## 2021-07-06 NOTE — TELEPHONE ENCOUNTER
[facility] phone call message- general phone call:    Reason for call: Pt would like to speak with  Amanda about medication.     Return call needed: Yes    OK to leave a message on voice mail? Yes    Primary language: English      needed? No    Call taken on July 6, 2021 at 12:40 PM by Grisel Flores-Cardona

## 2021-07-07 RX ORDER — TIOTROPIUM BROMIDE INHALATION SPRAY 1.56 UG/1
2 SPRAY, METERED RESPIRATORY (INHALATION) DAILY
Qty: 4 G | Refills: 5 | Status: SHIPPED | OUTPATIENT
Start: 2021-07-07 | End: 2022-01-10

## 2021-07-07 NOTE — TELEPHONE ENCOUNTER
Patient was called and notified.  She verbalized understanding.  Amanda Carlos, [facility]

## 2021-07-07 NOTE — TELEPHONE ENCOUNTER
Amanda-    Per Dr. Conteh, a different formulation, the spiriva respimat, is covered.  We have sent in prescriptions for that medication.      Can you call and let her know that the new prescription has been sent?    Cammie Bond MD    Routed to TRANG Patel

## 2021-07-26 ENCOUNTER — TELEPHONE (OUTPATIENT)
Dept: FAMILY MEDICINE | Facility: CLINIC | Age: 31
End: 2021-07-26

## 2021-07-26 NOTE — TELEPHONE ENCOUNTER
Cammie Bond MD Trevino, Cynthia, SW  Sorjackelin-- note is not done, but I got a start on the information about her housing troubles.  She is open to you calling this afternoon to talk more.  Thank you!

## 2021-08-09 ENCOUNTER — TELEPHONE (OUTPATIENT)
Dept: FAMILY MEDICINE | Facility: CLINIC | Age: 31
End: 2021-08-09
Payer: COMMERCIAL

## 2021-08-09 NOTE — TELEPHONE ENCOUNTER
[facility] phone call message- general phone call:    Reason for call: PT would like Amanda to call her back regarding the safe at home question.    Return call needed: Yes    OK to leave a message on voice mail? Yes    Primary language: English      needed? No    Call taken on August 9, 2021 at 11:06 AM by Gama Preciado

## 2021-08-10 NOTE — TELEPHONE ENCOUNTER
Called patient but there was no answer.  LMTCCB.  Amanda Carlos, [facility]

## 2021-08-10 NOTE — TELEPHONE ENCOUNTER
Pt called back but is not free again until after 4:30.  I asked that she call us back when she is available.  She said she will call today after 4:30.

## 2021-08-10 NOTE — TELEPHONE ENCOUNTER
If she is concerned about her housing safety issue, she should really talk to Petty who has more expertise in this area.   Amanda Carlos CMA

## 2021-08-11 NOTE — TELEPHONE ENCOUNTER
GURU recieved a phone call from patient. She indicates that she needs help with housing resources. She currently lives in a subsidized apartment complex, not [facility] but unit based subsidized instead. She indicates there are cock roaches and mice in the whole building. They have sent in extermination, many times, but the issue is building wide so it doesn't fix the problem.     Donna states that her neighbor recently moved out and their doctor helped them to do so. She wants the clinic to help her move. GURU indicates there are limitations to assistance they can provide but they could definitely get them off in the right direction with resources and leads. Donna is now working and has a stable income so she is not looking for subsidized unit, which can be very limited in their openings. She is okay looking for a market rate unit. She has 3 children so is look for a 3 bedroom unit.     Donna had to cut the phone call short but does ask for resources to be  emailed to her to her email address that's on file.     GURU agrees to send her housing link website along with a brief search of openings.     GERSON Schafer

## 2021-08-18 ENCOUNTER — TELEPHONE (OUTPATIENT)
Dept: FAMILY MEDICINE | Facility: CLINIC | Age: 31
End: 2021-08-18

## 2021-08-18 NOTE — TELEPHONE ENCOUNTER
Donna requesting two letters from Dr. Bond:  1. Letter to keep her dog due to medical reasons.  2. Letter that states the place she is living right now is not good for her health.    These can be mailed to her. Confirmed address on file is correct.    Routed to Dr. Bond. ./BENJAMIN

## 2021-08-18 NOTE — LETTER
August 19, 2021      Donna Larose  [address]  [place] [address]        To whom this may concern;    Donna Larose is a patient of [facility] at [facility].  She has a history of seasonal allergies and severe persistent asthma, for which she has been hospitalized in the past year and for which she takes multiple medications and inhalers.  The conditions of her current home, including the presence of mold, mildew and other irritants has contributed to worsening asthma and the poor overall status of her breathing at this time.  Her symptoms are worse at home than at work or outside.  Her severe asthma remains difficult to control given her regular exposure to asthma triggers at home, and I recommend that she pursue alternative housing options for the good of her health.  Please call the clinic at [phone] if you have any questions.        Sincerely,        Cammie Bond MD

## 2021-08-18 NOTE — TELEPHONE ENCOUNTER
[facility] phone call message- general phone call:    Reason for call: Would like to speak with  about letter for housing. Pt states she has spoken with provider about concerns before.    Return call needed: Yes    OK to leave a message on voice mail? Yes    Primary language: English      needed? No    Call taken on August 18, 2021 at 10:07 AM by Grisel Flores-Cardona

## 2021-08-18 NOTE — LETTER
August 19, 2021      Donna Larose  [address]  [place] [address]        Dear Karen;    My name is Cammie Bond MD and I am the primary care provider for Donna Larose.  She has a previous diagnosis of major depression and anxiety, and uses a support animal to help manage these symptoms, along with taking medications, and relaxation therapy.  Please allow her to have a support animal present in her home.  She understands that she is responsible for the training and behavior of the animal.  Please call the clinic at [phone] if you have any questions.        Sincerely,          Cammie Bond MD

## 2021-08-19 NOTE — TELEPHONE ENCOUNTER
I will write these letters.  We did talk about this previously.  In the future, however, if the patient is needing letters written, she should schedule a phone or in person visit so we can draft the letters together.  This is the clinic policy.    Cammie Bond MD      Letters have been written and are viewable in chart.  Deena, can you print and mail to patient?  Otherwise I will be in the office on Tuesday next week and can print, sign and mail at that time.     Cammie Bond MD    Routed to ALISA Shaffer

## 2021-09-19 ENCOUNTER — HEALTH MAINTENANCE LETTER (OUTPATIENT)
Age: 31
End: 2021-09-19

## 2021-10-15 ENCOUNTER — OFFICE VISIT (OUTPATIENT)
Dept: FAMILY MEDICINE | Facility: CLINIC | Age: 31
End: 2021-10-15
Payer: COMMERCIAL

## 2021-10-15 VITALS
RESPIRATION RATE: 16 BRPM | OXYGEN SATURATION: 99 % | TEMPERATURE: 99 F | DIASTOLIC BLOOD PRESSURE: 87 MMHG | SYSTOLIC BLOOD PRESSURE: 121 MMHG | HEART RATE: 95 BPM

## 2021-10-15 DIAGNOSIS — J02.9 SORE THROAT: Primary | ICD-10-CM

## 2021-10-15 LAB — DEPRECATED S PYO AG THROAT QL EIA: NEGATIVE

## 2021-10-15 PROCEDURE — 99213 OFFICE O/P EST LOW 20 MIN: CPT | Mod: GC | Performed by: STUDENT IN AN ORGANIZED HEALTH CARE EDUCATION/TRAINING PROGRAM

## 2021-10-15 PROCEDURE — 87651 STREP A DNA AMP PROBE: CPT | Performed by: STUDENT IN AN ORGANIZED HEALTH CARE EDUCATION/TRAINING PROGRAM

## 2021-10-15 NOTE — PROGRESS NOTES
Preceptor Attestation:    I discussed the patient with the resident and evaluated the patient in person. I have verified the content of the note, which accurately reflects my assessment of the patient and the plan of care.   Supervising Physician:  Prem Cheek MD.

## 2021-10-15 NOTE — PROGRESS NOTES
Assessment & Plan     Sore throat  Patient presenting with sore throat for 1 day.  She does not have any other symptoms concerning for Covid or influenza.  Her rapid strep was negative awaiting PCR at this time.  Likely to just be a cold, however I offered Covid swab and patient declined.  I think this is appropriate given she does not have cardinal symptoms.  Patient encouraged to follow-up if she develops new or worsening symptoms.  Recommended Tylenol and ibuprofen for symptoms.  Also recommended tea with honey for sore throat.  - Streptococcus A Rapid Scr w Reflx to PCR  - Group A Streptococcus PCR Throat Swab    Precepted with Dr Adia Flood MD  [facility] DUKE Thompson is a 31 year old who presents for the following health issues    HPI   Pt comes in for evaluation of sore throat which started yesterday. Her symptoms started upon waking in the morning. She notes the sore throat is worse on the right side. She has had no fever or chills and no cough or difficulty breathing. Her congestion is unchanged from baseline (chronic congestion from allergies). Pt has not been vaccinated for COVID-19. She has not had any sick contacts however she [facility] because her daughter had an asthma attack.        Review of Systems   Constitutional, HEENT, cardiovascular, pulmonary, gi and gu systems are negative, except as otherwise noted.      Objective    There were no vitals taken for this visit.  There is no height or weight on file to calculate BMI.  Physical Exam   GENERAL: healthy, alert and no distress  NECK: no adenopathy, no asymmetry, masses, or scars and thyroid normal to palpation  RESP: lungs clear to auscultation - no rales, rhonchi or wheezes  CV: regular rate and rhythm, normal S1 S2, no S3 or S4, no murmur, click or rub, no peripheral edema and peripheral pulses strong  ABDOMEN: soft, nontender, no hepatosplenomegaly, no masses and  bowel sounds normal  MS: no gross musculoskeletal defects noted, no edema    Results for orders placed or performed in visit on 10/15/21   Streptococcus A Rapid Scr w Reflx to PCR     Status: Normal    Specimen: Throat; Swab   Result Value Ref Range    Group A Strep antigen Negative Negative

## 2021-10-16 LAB — GROUP A STREP BY PCR: NOT DETECTED

## 2021-11-08 ENCOUNTER — TELEPHONE (OUTPATIENT)
Dept: FAMILY MEDICINE | Facility: CLINIC | Age: 31
End: 2021-11-08
Payer: COMMERCIAL

## 2021-11-08 DIAGNOSIS — B88.2: Primary | ICD-10-CM

## 2021-11-08 NOTE — TELEPHONE ENCOUNTER
"SW called patient to follow up on housing concerns. They are living in a 2 bedroom apartment in [place] with her 3 children who are 4, 6, and 8 years old. Have been living in apartment for many years but noticed the bugs after renovations about 2 years ago. Address is [address]  [place] [address].     Lots of issues with roaches, bugs, mold, and rodents. Has taken videos and pictures of this. Has seen the carpet \"move\" as there were thousands of roaches underneath the carpet. Roaches in refrigerator, oven, microwave when they climb through the broken clock fixtures, and in fish tanks eating the fish.     Biggest issue with this is it triggers her daughters', Donna and Paola's asthma.  Both have been hospitalized for this in the past year. Is losing sleep as she is so concerned about her children's health. Has talked with the landlord multiple times without much improvement. Is losing sleep as she is so concerned about her children's health. Had called health  and there were several different exterminators who came out to spray and they did not have any hope regarding improving this. Donna then stated she lost hope and stopped trying to get inspectors to come out.     Is now working, saving up, and has applied for emergency rental assistance.  Is having a lot of trouble finding apartments that they can afford. Main barrier is her credit, she has a stable job and can afford about $1500-$1800 per month for a 3 bedroom place. Is on month to month lease so she would not have to worry about breaking leases.     There is also bad phone reception, phone call dropped 3 times throughout 20 minute conversation.     GURU facilitated 3 way call with patient and [facility] Department of Inspections ([phone]). They gathered information regarding vermin, broken smoke detectors, and neighbor concerns from patient and will send out a  tomorrow, Tuesday 11/9/21 for an " inspection.   Representative did indicate that there were other complaints on file for this building.   They provided information for legal resources including Home Line and other legal resources.     Routing to Dr. Bond and CAL Patel for update.     LAURYN Prescott

## 2021-11-10 NOTE — TELEPHONE ENCOUNTER
11/10/21:   Call from patient informing that she had received a call from Department of Safety and Inspections, but they called at 4:24pm and they close at 4:30pm, and she was working and unable to call them back. She was under the impression that they would be coming to inspect on Tuesday. SW confirmed that this had been the plan. States she did not sleep well last night as she had her children in her room and she was flicking roaches off of them all night.     Due to work schedule, Donna is only available before 8:00am, a break 10:00-10:15, 12:00-12:30, and 3:30-3:45.     Called [facility] Department of Inspections ([phone]) and representative confirmed that they have the case open, but the assigned  called out sick this morning. They have 48-72 hours to complete an inspection, so they will do so tomorrow. They will call patient and are not able to call at certain times, but they will leave a VM and patient can call back when she is on break.     Called patient to let her know. She agreed, but informed that they called and left her a VM stating they are closed tomorrow due to Holton's Day This is frustrating for her.     Patient will call SW back on Friday if she has not heard from them.     LAURYN Prescott

## 2021-11-10 NOTE — TELEPHONE ENCOUNTER
"SW called patient to follow up on housing concerns. They are living in a 2 bedroom apartment in [place] with her 3 children who are 4, 6, and 8 years old. Have been living in apartment for many years but noticed the bugs after renovations about 2 years ago. Address is [address]  [place] [address].     Lots of issues with roaches, bugs, mold, and rodents. Has taken videos and pictures of this. Has seen the carpet \"move\" as there were thousands of roaches underneath the carpet. Roaches in refrigerator, oven, microwave when they climb through the broken clock fixtures, and in fish tanks eating the fish.     Biggest issue with this is it triggers her daughters', Donna and Paola's asthma.  Both have been hospitalized for this in the past year. Is losing sleep as she is so concerned about her children's health. Has talked with the landlord multiple times without much improvement. Is losing sleep as she is so concerned about her children's health. Had called health  and there were several different exterminators who came out to spray and they did not have any hope regarding improving this. Donna then stated she lost hope and stopped trying to get inspectors to come out.     Is now working, saving up, and has applied for emergency rental assistance.  Is having a lot of trouble finding apartments that they can afford. Main barrier is her credit, she has a stable job and can afford about $1500-$1800 per month for a 3 bedroom place. Is on month to month lease so she would not have to worry about breaking leases.     There is also bad phone reception, phone call dropped 3 times throughout 20 minute conversation.     GURU facilitated 3 way call with patient and [facility] Department of Inspections ([phone]). They gathered information regarding vermin, broken smoke detectors, and neighbor concerns from patient and will send out a  tomorrow, Tuesday 11/9/21 for an " inspection.   Representative did indicate that there were other complaints on file for this building.   They provided information for legal resources including Home Line and other legal resources.     Routing to Dr. Bond and CAL Patel for update.     LAURYN Prescott

## 2021-11-11 NOTE — TELEPHONE ENCOUNTER
November 11, 2021   Legal Services Referral - [place] only  Legal referral has been sent to Beba Magana from [facility].     Scan/Send demographics and referral to [email or web address]    She will review, advise, and contact the patient.     Rachel Castaneda

## 2021-11-12 NOTE — TELEPHONE ENCOUNTER
7:50am:   Called patient before her shift to check in and see if [facility] Dept of Inspections had come out to do inspection. Left VM.     10:15am:   Call from patient informing that she did get in touch with someone regarding this and they sent her paperwork to fill out and mail to the landlord. If landlord does not take action within 14 days they can go to court. She filled out these forms and put them in the pot box this morning before work. Patient thought it was the [facility] Dept of Safety and Inspections, but it was someone from [facility]. Facilitated 3 way call to [facility]. SW connected her to [facility] Department of Inspections ([phone]) to follow up regarding sending out an .     Consulted with  Beba Magana and learned the following information:   -Tenants can make demands without an inspection. Demands should be made in writing and mailed to landlord  - If there is no response to the demands within the listed timeline, then the tenant can file a rent escrow action in civil court.   -Home inspection is a separate process but should still be done to add pressure on the landlord and creates a neutral evidence record of everything that is wrong  -Office of safety and inspections comes 48-72 hours after the complaint is filed, and they come without informing the landlord. They then send their own demands to landlord with deadlines  -Inspections stay on file and landlords with too many complaints lose their rental license.     Ya Lua LGSW

## 2021-11-30 NOTE — TELEPHONE ENCOUNTER
11/30/21:     The exterminators are coming tomorrow, Weds 12/1/21, to do another extermination. Karen did contact Donna and provided plastic bins to take everything out of the cabinets      Robin

## 2021-12-10 ENCOUNTER — TELEPHONE (OUTPATIENT)
Dept: FAMILY MEDICINE | Facility: CLINIC | Age: 31
End: 2021-12-10
Payer: COMMERCIAL

## 2021-12-10 NOTE — TELEPHONE ENCOUNTER
"Patient shares yesterday she began experiencing sinus congestion in the middle of her eyes. Today it has moved toward her left side - she is feeling an ache above her left eye near her eyebrow. Clarifies it is not in her actual eye. When she bends over she feels \"straining.\" It otherwise feels like an ache. She feels pain when she presses the area. She is audibly congested on the phone.    No cough, fever, shortness of breath. Has not taken anything except her normal daily Loratadine. Offered her appointment however discussed home care would be appropriate if she wanted to try this first. She is agreeable to this. Recommended Ibuprofen and asking her pharmacist to recommend a decongestant for her which she will do. Let her know about on call provider during nights and weekends in case symptoms changed or worsened. Routed to Dr. Bond for FYI. ./LR    "

## 2021-12-10 NOTE — TELEPHONE ENCOUNTER
[facility] phone call message- general phone call:    Reason for call: Pt would like to speak with nurse about left eye pain, and would like to make sure it is from her sinus     Return call needed: Yes    OK to leave a message on voice mail? Yes    Primary language: English      needed? No    Call taken on December 10, 2021 at 1:11 PM by Grisel Flores-Cardona

## 2021-12-14 ENCOUNTER — TELEPHONE (OUTPATIENT)
Dept: FAMILY MEDICINE | Facility: CLINIC | Age: 31
End: 2021-12-14
Payer: COMMERCIAL

## 2021-12-14 NOTE — TELEPHONE ENCOUNTER
[facility] phone call message- general phone call:    Reason for call: Pt is in a lot of pain and would like to see Dr.Freeman gloria and is stating pt went to [facility] and diagnosed her with a sinus infection and pt would like to speak with nurse     Return call needed: Yes    OK to leave a message on voice mail? Yes    Primary language: English      needed? No    Call taken on December 14, 2021 at 11:20 AM by Grisel Flores-Cardona

## 2021-12-22 ENCOUNTER — TELEPHONE (OUTPATIENT)
Dept: FAMILY MEDICINE | Facility: CLINIC | Age: 31
End: 2021-12-22

## 2021-12-22 ENCOUNTER — OFFICE VISIT (OUTPATIENT)
Dept: FAMILY MEDICINE | Facility: CLINIC | Age: 31
End: 2021-12-22
Payer: COMMERCIAL

## 2021-12-22 VITALS
HEART RATE: 96 BPM | TEMPERATURE: 98.3 F | SYSTOLIC BLOOD PRESSURE: 141 MMHG | BODY MASS INDEX: 30.62 KG/M2 | WEIGHT: 201.4 LBS | OXYGEN SATURATION: 100 % | DIASTOLIC BLOOD PRESSURE: 89 MMHG | RESPIRATION RATE: 16 BRPM

## 2021-12-22 DIAGNOSIS — J01.01 ACUTE RECURRENT MAXILLARY SINUSITIS: ICD-10-CM

## 2021-12-22 DIAGNOSIS — J45.41 MODERATE PERSISTENT ASTHMA WITH ACUTE EXACERBATION: ICD-10-CM

## 2021-12-22 DIAGNOSIS — J34.89 SINUS PAIN: Primary | ICD-10-CM

## 2021-12-22 PROCEDURE — 99214 OFFICE O/P EST MOD 30 MIN: CPT | Performed by: STUDENT IN AN ORGANIZED HEALTH CARE EDUCATION/TRAINING PROGRAM

## 2021-12-22 RX ORDER — ECHINACEA PURPUREA EXTRACT 125 MG
TABLET ORAL
Qty: 15 ML | Refills: 1 | Status: SHIPPED | OUTPATIENT
Start: 2021-12-22 | End: 2023-03-29

## 2021-12-22 NOTE — TELEPHONE ENCOUNTER
12/22/2021: Care Coordination  Ms. Larose has been in communication with Ya Mix regarding the living condition at her apartment. Rodents, roaches, and mold in her apartment. Ya has been in communications with the city of [place]  To have safety inspections done. However, during an office this morning, I was asked to speak with her regarding her housing conditions. I suggested that it might be time to forget about trying to make the landlord do the needed repairs, and time to consider finding a knew apartment or house.     I asked what amount would she be willing to pay, and what location did she want to move to? Her response was around $1500.00 - $1600.00 per month and the location left open to what might be available.  Based on that information, I did a search on housing link and put together a few options and mailed them today.         Kathrin  [address] [facility] [address]  [phone]    Rent: $691-$2055  Total # of Units: 243  3 Bedroom Apartment, [address]  Available: 01/10/2022  Deposit: 1x rent  Application Fee: $0  Minimum Income: 2.5 times rent  Maximum Occupants: 6    [facility]  [address] [facility] [address]  [phone]    Rent: $1636-$1740  Total # of Units: 60  3 Bedroom Apartment, [address]  Available: 01/31/2022  Deposit: $300  Application Fee: $35  Minimum Income: 1.5 times rent  Maximum Occupants: 6        [place] [address] [facility] [address]  [phone]    Rent: $1845  Total # of Units: 2  3 Bedroom Duplex, [address]  Available: 12/20/2021  Deposit: 1x rent  Application Fee: $50  Minimum Income: 3500  Maximum Occupants: 5      [facility]  [address] [facility]   [phone]    Rent: $1636  Total # of Units: 73  3 Bedroom Apartment, [address]  Available: 02/15/2022  Deposit: $500  Application Fee: $45  Minimum Income: 2.0 times rent        [facility]  [address] [facility] [address]  [phone]    Rent: $1566-$1566  Total  I concur with the Admission Order and I certify that services are provided in accordance with Section 42 CFR § 412.3 # of Units: 81  3 Bedroom Apartment, [address]  Available: 10/18/2021  Deposit: $500-$1566  Application Fee: $25  Minimum Income: 2.5 times rent  Maximum Occupants: 2  Utilities Resident Pays: Electric (lights)        Amari Mota  Care Coordination   Direct: [phone]
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
## 2021-12-22 NOTE — PATIENT INSTRUCTIONS
For your sinus problems:    Take the antibiotics, 1 pill 2x per day for 10 days. Take with yogurt  Use the nasal saline spray to clear out congestion.   Restart the Claritin.    Keep doing the flonase, and  Use your albuterol, 2 puffs 3x per day for the next few days.      If not better in 2 weeks come back.          01/21/22     [facility] ENT    [facility]  [address] [place] [address]  Phone: [phone]  Fax: [phone]    Referral, demographics and office note faxed to [phone].     Rachel Castaneda

## 2021-12-22 NOTE — LETTER
December 22, 2021      Donna Larose  [address]   [place] [address]        Dear Donna,  I am one of the Care Coordinators with [facility]. Thank you for tanking time out of your day to discuss you housing concerns. Based on the information you provided I have put together a list of available locations. Good Donaldson with your Search!!        [place]  [address]  [facility] [address]  [phone]    Rent: $691-$2055  Total # of Units: 243  3 Bedroom Apartment, [address]  Available: 01/10/2022  Deposit: 1x rent  Application Fee: $0  Minimum Income: 2.5 times rent  Maximum Occupants: 6    [facility]  [address]  [facility] [address]  [phone]    Rent: $1636-$1740  Total # of Units: 60  3 Bedroom Apartment, [address]  Available: 01/31/2022  Deposit: $300  Application Fee: $35  Minimum Income: 1.5 times rent  Maximum Occupants: 6        [place]  [address] [facility] [address]  [phone]    Rent: $1845  Total # of Units: 2  3 Bedroom Duplex, [address]  Available: 12/20/2021  Deposit: 1x rent  Application Fee: $50  Minimum Income: 3500  Maximum Occupants: 5      [facility]  [address] [facility]   [phone]    Rent: $1636  Total # of Units: 73  3 Bedroom Apartment, [address]  Available: 02/15/2022  Deposit: $500  Application Fee: $45  Minimum Income: 2.0 times rent        [place]  [address] [facility] [address]  [phone]    Rent: $1566-$1566  Total # of Units: 81  3 Bedroom Apartment, [address]  Available: 10/18/2021  Deposit: $500-$1566  Application Fee: $25  Minimum Income: 2.5 times rent  Maximum Occupants: 2  Utilities Resident Pays: Electric (lights)                                  Sincerely,    Amari Mota

## 2022-01-09 ENCOUNTER — HEALTH MAINTENANCE LETTER (OUTPATIENT)
Age: 32
End: 2022-01-09

## 2022-02-25 ENCOUNTER — TELEPHONE (OUTPATIENT)
Dept: FAMILY MEDICINE | Facility: CLINIC | Age: 32
End: 2022-02-25

## 2022-02-25 NOTE — TELEPHONE ENCOUNTER
[facility] phone call message- general phone call:    Reason for call: She needs a call back from Amanda Nichols desired: call back.    Return call needed: Yes    OK to leave a message on voice mail? Yes    Advised patient to response may take up to 2 business days: Yes       Primary language: English      needed? No    Call taken on February 25, 2022 at 8:07 AM by Andrew Moncada

## 2022-03-09 ENCOUNTER — OFFICE VISIT (OUTPATIENT)
Dept: FAMILY MEDICINE | Facility: CLINIC | Age: 32
End: 2022-03-09
Payer: COMMERCIAL

## 2022-03-09 VITALS
OXYGEN SATURATION: 92 % | DIASTOLIC BLOOD PRESSURE: 85 MMHG | WEIGHT: 206 LBS | TEMPERATURE: 98.9 F | RESPIRATION RATE: 20 BRPM | HEIGHT: 67 IN | BODY MASS INDEX: 32.33 KG/M2 | SYSTOLIC BLOOD PRESSURE: 128 MMHG | HEART RATE: 94 BPM

## 2022-03-09 DIAGNOSIS — Z65.3 PROBLEMS RELATED TO OTHER LEGAL CIRCUMSTANCES: Primary | ICD-10-CM

## 2022-03-09 DIAGNOSIS — F33.41 RECURRENT MAJOR DEPRESSIVE DISORDER, IN PARTIAL REMISSION (H): ICD-10-CM

## 2022-03-09 DIAGNOSIS — J45.40 MODERATE PERSISTENT ASTHMA, UNSPECIFIED WHETHER COMPLICATED: ICD-10-CM

## 2022-03-09 DIAGNOSIS — B00.2 RECURRENT ORAL HERPES SIMPLEX: ICD-10-CM

## 2022-03-09 PROCEDURE — 99214 OFFICE O/P EST MOD 30 MIN: CPT | Performed by: STUDENT IN AN ORGANIZED HEALTH CARE EDUCATION/TRAINING PROGRAM

## 2022-03-09 RX ORDER — TIOTROPIUM BROMIDE INHALATION SPRAY 1.56 UG/1
2 SPRAY, METERED RESPIRATORY (INHALATION) DAILY
Qty: 4 G | Refills: 5 | Status: SHIPPED | OUTPATIENT
Start: 2022-03-09

## 2022-03-09 RX ORDER — FLUTICASONE PROPIONATE 50 MCG
2 SPRAY, SUSPENSION (ML) NASAL DAILY
Qty: 16 G | Refills: 3 | Status: SHIPPED | OUTPATIENT
Start: 2022-03-09 | End: 2023-03-29

## 2022-03-09 RX ORDER — VALACYCLOVIR HYDROCHLORIDE 500 MG/1
500 TABLET, FILM COATED ORAL 2 TIMES DAILY
Qty: 180 TABLET | Refills: 1 | Status: SHIPPED | OUTPATIENT
Start: 2022-03-09 | End: 2023-03-29

## 2022-03-09 RX ORDER — PREDNISONE 20 MG/1
TABLET ORAL
Qty: 10 TABLET | Refills: 1 | Status: SHIPPED | OUTPATIENT
Start: 2022-03-09 | End: 2023-03-29

## 2022-03-09 RX ORDER — ALBUTEROL SULFATE 90 UG/1
2 AEROSOL, METERED RESPIRATORY (INHALATION) EVERY 4 HOURS PRN
Qty: 18 G | Refills: 1 | Status: SHIPPED | OUTPATIENT
Start: 2022-03-09 | End: 2023-06-22

## 2022-03-09 RX ORDER — ALBUTEROL SULFATE 0.83 MG/ML
SOLUTION RESPIRATORY (INHALATION)
Qty: 75 ML | Refills: 1 | Status: SHIPPED | OUTPATIENT
Start: 2022-03-09 | End: 2024-06-24

## 2022-03-09 RX ORDER — LORATADINE 10 MG/1
10 TABLET ORAL DAILY
Qty: 90 TABLET | Refills: 1 | Status: SHIPPED | OUTPATIENT
Start: 2022-03-09 | End: 2023-03-29

## 2022-03-09 ASSESSMENT — PATIENT HEALTH QUESTIONNAIRE - PHQ9: SUM OF ALL RESPONSES TO PHQ QUESTIONS 1-9: 7

## 2022-03-09 NOTE — PATIENT INSTRUCTIONS
Refill asthma and allergy medications.    Take your albuterol nebs 4x per day right now.  Cut down on the smoking!  It is likely making your anxiety worse.   Dr. Bond will place a referral to connect you with the legal team.  Dr. Bond will call the Apartment complex and discuss the indications for the emotional support animal.        March 16, 2022   Legal Services Referral - [place] only  Legal referral has been sent to Beba Magana from [facility].     Scan/Send demographics and referral to [email or web address]    She will review, advise, and contact the patient.     Rachel Castaneda
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
## 2022-03-14 ENCOUNTER — TELEPHONE (OUTPATIENT)
Dept: FAMILY MEDICINE | Facility: CLINIC | Age: 32
End: 2022-03-14
Payer: COMMERCIAL
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
## 2022-03-24 ENCOUNTER — TELEPHONE (OUTPATIENT)
Dept: FAMILY MEDICINE | Facility: CLINIC | Age: 32
End: 2022-03-24
Payer: COMMERCIAL
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
## 2022-03-29 ENCOUNTER — TELEPHONE (OUTPATIENT)
Dept: FAMILY MEDICINE | Facility: CLINIC | Age: 32
End: 2022-03-29
Payer: COMMERCIAL

## 2022-03-29 NOTE — TELEPHONE ENCOUNTER
Call placed to patient's  at #[phone] reasonable accommodations for her apartment, including an emotional support animal.     Patient is NOT disabled, but does have mental health diagnoses and would benefit from an emotional support animal due to her history of PTSD, and recurrent major depression.  Requested a call back to discuss further.     Cammie Bond MD

## 2022-03-30 NOTE — TELEPHONE ENCOUNTER
Second outreach call:  #[phone]    --Requested a call back to clinic number to discuss accomodations for patient.    Cammie Bond MD

## 2022-03-31 ENCOUNTER — TELEPHONE (OUTPATIENT)
Dept: FAMILY MEDICINE | Facility: CLINIC | Age: 32
End: 2022-03-31

## 2022-03-31 ENCOUNTER — TELEPHONE (OUTPATIENT)
Dept: FAMILY MEDICINE | Facility: CLINIC | Age: 32
End: 2022-03-31
Payer: COMMERCIAL

## 2022-03-31 NOTE — TELEPHONE ENCOUNTER
03/31/2022: Care Coordination     [facility] was transferred to me this morning.  Fannie Trinidad [phone] called trying to reach Dr. Bond regarding the request for reasonable  Accomodation's needed for Ms. Larose's apartment . I explained my position and told her that I send Dr. Bond a message.       Amari Mota Sr.  Care Coordinator  [address] [address]  [email or web address]   Office: [phone]  Direct: [phone]  [facility]

## 2022-03-31 NOTE — TELEPHONE ENCOUNTER
Team received a call back from Fannie Trinidad from [facility] regarding the reasonable accommodations for Donna Larose. She is asking that you call her mobile phone:[phone].    No answer when I called.  Left a message and hopefully we can connect tomorrow when I'm precepting in the morning.  Please page overhead if you hear back from her.     Cammie Bond MD

## 2022-05-31 ENCOUNTER — OFFICE VISIT (OUTPATIENT)
Dept: FAMILY MEDICINE | Facility: CLINIC | Age: 32
End: 2022-05-31
Payer: COMMERCIAL

## 2022-05-31 VITALS
SYSTOLIC BLOOD PRESSURE: 136 MMHG | WEIGHT: 203 LBS | RESPIRATION RATE: 16 BRPM | OXYGEN SATURATION: 100 % | DIASTOLIC BLOOD PRESSURE: 86 MMHG | TEMPERATURE: 98.2 F | HEART RATE: 76 BPM | BODY MASS INDEX: 31.79 KG/M2

## 2022-05-31 DIAGNOSIS — F33.41 RECURRENT MAJOR DEPRESSIVE DISORDER, IN PARTIAL REMISSION (H): ICD-10-CM

## 2022-05-31 DIAGNOSIS — J45.41 MODERATE PERSISTENT ASTHMA WITH ACUTE EXACERBATION: Primary | ICD-10-CM

## 2022-05-31 PROCEDURE — 99214 OFFICE O/P EST MOD 30 MIN: CPT | Performed by: STUDENT IN AN ORGANIZED HEALTH CARE EDUCATION/TRAINING PROGRAM

## 2022-05-31 ASSESSMENT — ASTHMA QUESTIONNAIRES: ACT_TOTALSCORE: 18

## 2022-05-31 NOTE — PROGRESS NOTES
There are no exam notes on file for this visit.    ASSESSMENT AND PLAN:      Donna was seen today for recheck medication.    Diagnoses and all orders for this visit:    Moderate persistent asthma with acute exacerbation.  We reviewed her asthma action plan.  She feels comfortable using prednisone as appropriate but will call next time she is needing to take the medication.  Continue with regular controller medications and albuterol as needed via inhaler or neb.  Working on cutting back on marijuana use and taking more deep breaths as well as some gentle exercises directed at her lungs.    Recurrent major depressive disorder, in partial remission (H).  She is now working at home which has decreased her stress level and is overall feeling well.  Still is not in a challenging housing situation, which has not improved.    Follow-up in 3 months for recheck of her asthma medications.  She will call if she is having difficulties with breathing so we can discuss prednisone and how long of course she might need to take.    Patient Instructions   Keep up the good work!    Use your inhalers regularly.     Make sure to take some big deep breaths, and go for a walk to help stretch out your lungs.     Use the prednisone if you need it - but call Dr. Bond if you are needing to take the prednisone so we can discuss how much.            Cammie Bond MD    SUBJECTIVE  Donna Larose is a 31 year old female with past medical history significant for    Patient Active Problem List   Diagnosis     OCP (oral contraceptive pills) initiation     Lung nodule     Pelvic pain in female     Migraine     Moderate persistent asthma with acute exacerbation     Oral herpes     Seborrheic dermatitis     Recurrent major depressive disorder, in partial remission (H)     PTSD (post-traumatic stress disorder)     Others present at the visit:  None    Presents for   Chief Complaint   Patient presents with     Recheck Medication      Patient is here for asthma follow-up visit.  Reports that she had worsening of her asthma symptoms about 2 weeks ago.  Noticed increased shortness of breath and cough.  Took 1 pill of prednisone 1 day and a second pill the next day, and symptoms improved.  She took the prednisone because her albuterol machine was no longer helping her.  She did not want to take the prednisone longer than that because she worries about potential complications from the medication.  She endorses taking the rest of her asthma medications regularly including the Dulera 2 puffs twice daily, the Spiriva 2+ puffs once daily, the albuterol as needed, as well as regular loratadine and using Flonase.  Feels like her asthma is currently well controlled and that things are doing well.    She does continue to smoke marijuana and does about 2 blunts per day.  She has been working at home which is decreasing her exposure to potential infections and this has been helpful for her.  She has not been doing much for exercise.  Will be trying to work on taking more deep breaths.  She feels comfortable with how to use the prednisone and will call if she is having more trouble.      OBJECTIVE:  Vitals: /86   Pulse 76   Temp 98.2  F (36.8  C) (Oral)   Resp 16   Wt 92.1 kg (203 lb)   SpO2 100%   BMI 31.79 kg/m    BMI= Body mass index is 31.79 kg/m .  Objective:    Vitals:  Vitals are reviewed and are within the normal range  Gen:  Alert, pleasant, no acute distress  No anterior or posterior cervical lymphadenopathy  Cardiac:  Regular rate and rhythm, no murmurs, rubs or gallops  Respiratory: Initial deep breaths slowly shows bilateral wheezes, but these clear with a good cough.  Lungs are otherwise clear to auscultation bilaterally.  Abdomen:  Soft, non-tender, non-distended, bowel sounds positive  Extremities:  Warm, well-perfused, pulses 2+/4, no lower extremity edema    No results found for any visits on 05/31/22.        Patient  Instructions   Keep up the good work!    Use your inhalers regularly.     Make sure to take some big deep breaths, and go for a walk to help stretch out your lungs.     Use the prednisone if you need it - but call Dr. Bond if you are needing to take the prednisone so we can discuss how much.            Cammie Bond MD

## 2022-05-31 NOTE — PATIENT INSTRUCTIONS
Keep up the good work!    Use your inhalers regularly.     Make sure to take some big deep breaths, and go for a walk to help stretch out your lungs.     Use the prednisone if you need it - but call Dr. Bond if you are needing to take the prednisone so we can discuss how much.

## 2022-07-12 ENCOUNTER — TELEPHONE (OUTPATIENT)
Dept: FAMILY MEDICINE | Facility: CLINIC | Age: 32
End: 2022-07-12
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
## 2022-07-12 NOTE — TELEPHONE ENCOUNTER
[facility] phone call message- general phone call:    Reason for call: Pt stated that she wants Amanda back as soon as she is able.     Return call needed: Yes    OK to leave a message on voice mail? Yes    Primary language: English      needed? No    Call taken on July 12, 2022 at 3:13 PM by Sherry Verde
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
## 2022-07-25 ENCOUNTER — TELEPHONE (OUTPATIENT)
Dept: FAMILY MEDICINE | Facility: CLINIC | Age: 32
End: 2022-07-25
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
## 2022-08-12 ENCOUNTER — TELEPHONE (OUTPATIENT)
Dept: FAMILY MEDICINE | Facility: CLINIC | Age: 32
End: 2022-08-12

## 2022-08-12 NOTE — TELEPHONE ENCOUNTER
She needs to know if she can get squeezed in to her sons appointment with  on Tuesday 8/16 for a STD ck says its urgent

## 2022-08-16 ENCOUNTER — OFFICE VISIT (OUTPATIENT)
Dept: FAMILY MEDICINE | Facility: CLINIC | Age: 32
End: 2022-08-16
Payer: COMMERCIAL

## 2022-08-16 VITALS
TEMPERATURE: 98.7 F | HEART RATE: 112 BPM | DIASTOLIC BLOOD PRESSURE: 86 MMHG | SYSTOLIC BLOOD PRESSURE: 126 MMHG | OXYGEN SATURATION: 100 % | RESPIRATION RATE: 16 BRPM

## 2022-08-16 DIAGNOSIS — Z20.2 POSSIBLE EXPOSURE TO STD: Primary | ICD-10-CM

## 2022-08-16 DIAGNOSIS — J45.40 MODERATE PERSISTENT ASTHMA WITHOUT COMPLICATION: ICD-10-CM

## 2022-08-16 DIAGNOSIS — R30.0 DYSURIA: ICD-10-CM

## 2022-08-16 LAB
CLUE CELLS: PRESENT
TRICHOMONAS, WET PREP: ABNORMAL
WBC'S/HIGH POWER FIELD, WET PREP: ABNORMAL
YEAST, WET PREP: ABNORMAL

## 2022-08-16 PROCEDURE — 99214 OFFICE O/P EST MOD 30 MIN: CPT | Performed by: STUDENT IN AN ORGANIZED HEALTH CARE EDUCATION/TRAINING PROGRAM

## 2022-08-16 PROCEDURE — 87210 SMEAR WET MOUNT SALINE/INK: CPT | Performed by: STUDENT IN AN ORGANIZED HEALTH CARE EDUCATION/TRAINING PROGRAM

## 2022-08-16 ASSESSMENT — ASTHMA QUESTIONNAIRES: ACT_TOTALSCORE: 19

## 2022-08-16 NOTE — PROGRESS NOTES
There are no exam notes on file for this visit.    ASSESSMENT AND PLAN:      Donna was seen today for std.    Diagnoses and all orders for this visit:    Possible exposure to STD  Dysuria  Concerned about exposure to potential sexual transmitted infection.  Initial wet prep positive for bacterial vaginosis, but she is having minimal symptoms and so declines treatment at this time.  Her urine sample was mislabeled, but she will return tomorrow and we will check a UA given her dysuria symptoms, as well as gonorrhea and chlamydia.  I will call her with these results if they are positive.  -     Wet preparation  -     Neisseria gonorrhoeae PCR  -     Chlamydia trachomatis PCR  -     UA reflex to Microscopic; Future  -     Chlamydia trachomatis PCR; Future  -     Neisseria gonorrhoeae PCR; Future    Moderate persistent asthma without complication.  Overall doing well from an asthma standpoint.  Her ACT score is 19.  She has medication she needs at home.  Reviewed her care plan.  Declines COVID vaccination.    She will return to clinic tomorrow to leave a new urine sample to check for potential sexual transmitted infection.    Patient Instructions   Initial testing looks okay.  It shows you have bacterial vaginosis, which is a common vaginal infection and NOT sexually transmitted.      No need to do medications unless it is itching and bothering you - if that is the case, we can send in a pill or give you a topical cream to treat.     The rest of the urine testing will come back in the next 1-2 days, and Dr. Bond will call if this is positive.        Cammie Bond MD    SUBJECTIVE  Donna Larose is a 32 year old female with past medical history significant for    Patient Active Problem List   Diagnosis     OCP (oral contraceptive pills) initiation     Lung nodule     Pelvic pain in female     Migraine     Moderate persistent asthma with acute exacerbation     Oral herpes     Seborrheic dermatitis      Recurrent major depressive disorder, in partial remission (H)     PTSD (post-traumatic stress disorder)     Others present at the visit:  Patient's son    Presents for   Chief Complaint   Patient presents with     STD     Pt is here for an STD check.      Patient presents today for STD check, as well as review of her asthma medications.    She was recently sexually active and is distrustful about her partner.  She wants to check just make sure everything is safe and normal.  Since having intercourse she has noticed an increased urge to pee, with limited amounts coming out.  She is having more coming out now, but it still less than normal.  She denies any burning, no abdominal pain, no fevers or chills she does notice a little bit of dead discharge with some mild itching.  She just had her menses so is not concerned about pregnancy.  She is not worried about HIV and does not want any blood work completed currently.    Her asthma has been well controlled.  She is taking the Dulera 2 puffs twice daily and the Spiriva once daily.  She uses her albuterol when needed but is using it very rarely.  Completed an ACT today with a total score of 19.  No recent prednisone use.  Still having difficulty with mold in her home.  She is working on options for alternative housing, and is hoping to be able to get her debt balance down to 0 and be able to move by spring of next year.  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  She works at the .  Making more money, likes the work environment, and is doing well there.    She does have a large electrical bill and would like some help checking into programs to help with pay off.  Currently owes about $1200.    OBJECTIVE:  Vitals: /86 (BP Location: Left arm, Patient Position: Sitting, Cuff Size: Adult Large)   Pulse 112   Temp 98.7  F (37.1  C) (Oral)   Resp 16   LMP 08/09/2022 (Exact Date)   SpO2 100%   Breastfeeding No   BMI=  There is no height or weight on file to calculate BMI.  Objective:    Vitals:  Vitals are reviewed and are within the normal range  Gen:  Alert, pleasant, no acute distress  Cardiac:  Regular rate and rhythm, no murmurs, rubs or gallops  Respiratory:  Lungs clear to auscultation bilaterally  Abdomen:  Soft, non-tender, non-distended, bowel sounds positive  Extremities:  Warm, well-perfused, pulses 2+/4, no lower extremity edema    Results for orders placed or performed in visit on 08/16/22   Wet preparation     Status: Abnormal    Specimen: Vagina; Swab   Result Value Ref Range    Trichomonas Absent Absent    Yeast Absent Absent    Clue Cells Present (A) Absent    WBCs/high power field 1+ (A) None    Narrative    Few bacteria/no odor       Unfortunately patient's urine was excellently labeled with her sounds sticker.  She was unable to leave a second urine today, so future orders were placed for tomorrow for UA and gonorrhea and Chlamydia testing.  She will come by on her way to work.    Patient Instructions   Initial testing looks okay.  It shows you have bacterial vaginosis, which is a common vaginal infection and NOT sexually transmitted.      No need to do medications unless it is itching and bothering you - if that is the case, we can send in a pill or give you a topical cream to treat.     The rest of the urine testing will come back in the next 1-2 days, and Dr. Bond will call if this is positive.        Cammie Bond MD

## 2022-08-16 NOTE — PATIENT INSTRUCTIONS
Initial testing looks okay.  It shows you have bacterial vaginosis, which is a common vaginal infection and NOT sexually transmitted.      No need to do medications unless it is itching and bothering you - if that is the case, we can send in a pill or give you a topical cream to treat.     The rest of the urine testing will come back in the next 1-2 days, and Dr. Bond will call if this is positive.

## 2022-08-17 ENCOUNTER — LAB (OUTPATIENT)
Dept: LAB | Facility: CLINIC | Age: 32
End: 2022-08-17
Payer: COMMERCIAL

## 2022-08-17 DIAGNOSIS — Z20.2 POSSIBLE EXPOSURE TO STD: ICD-10-CM

## 2022-08-17 DIAGNOSIS — R30.0 DYSURIA: ICD-10-CM

## 2022-08-17 LAB
ALBUMIN UR-MCNC: NEGATIVE MG/DL
APPEARANCE UR: ABNORMAL
BACTERIA #/AREA URNS HPF: ABNORMAL /HPF
BILIRUB UR QL STRIP: NEGATIVE
COLOR UR AUTO: YELLOW
GLUCOSE UR STRIP-MCNC: NEGATIVE MG/DL
HGB UR QL STRIP: ABNORMAL
KETONES UR STRIP-MCNC: NEGATIVE MG/DL
LEUKOCYTE ESTERASE UR QL STRIP: ABNORMAL
NITRATE UR QL: NEGATIVE
PH UR STRIP: 6.5 [PH] (ref 5–8)
RBC #/AREA URNS AUTO: ABNORMAL /HPF
SP GR UR STRIP: 1.02 (ref 1–1.03)
SQUAMOUS #/AREA URNS AUTO: ABNORMAL /LPF
UROBILINOGEN UR STRIP-ACNC: 0.2 E.U./DL
WBC #/AREA URNS AUTO: ABNORMAL /HPF

## 2022-08-17 PROCEDURE — 87591 N.GONORRHOEAE DNA AMP PROB: CPT

## 2022-08-17 PROCEDURE — 87491 CHLMYD TRACH DNA AMP PROBE: CPT

## 2022-08-17 PROCEDURE — 81001 URINALYSIS AUTO W/SCOPE: CPT

## 2022-08-17 NOTE — RESULT ENCOUNTER NOTE
Attempted to call patient but no answer and unable to leave VM.  Urine shows signs of irritation, and more of the clue cells.  Knowing that, I would like to treat her bacterial vaginosis -- with either oral or vaginal metronidazole, so see if this improves symptoms and clears the blood present in her urine.  Will try again later today, but if she calls back, please see if she would prefer oral vs vaginal medication.      Cammie Bond MD

## 2022-08-18 LAB
C TRACH DNA SPEC QL NAA+PROBE: NEGATIVE
N GONORRHOEA DNA SPEC QL NAA+PROBE: NEGATIVE

## 2022-11-15 ENCOUNTER — OFFICE VISIT (OUTPATIENT)
Dept: FAMILY MEDICINE | Facility: CLINIC | Age: 32
End: 2022-11-15
Payer: COMMERCIAL

## 2022-11-15 VITALS
HEART RATE: 96 BPM | DIASTOLIC BLOOD PRESSURE: 84 MMHG | SYSTOLIC BLOOD PRESSURE: 129 MMHG | BODY MASS INDEX: 29.61 KG/M2 | WEIGHT: 195.4 LBS | TEMPERATURE: 99.4 F | RESPIRATION RATE: 20 BRPM | HEIGHT: 68 IN | OXYGEN SATURATION: 100 %

## 2022-11-15 DIAGNOSIS — J30.2 SEASONAL ALLERGIC RHINITIS, UNSPECIFIED TRIGGER: Primary | ICD-10-CM

## 2022-11-15 DIAGNOSIS — J45.41 MODERATE PERSISTENT ASTHMA WITH ACUTE EXACERBATION: ICD-10-CM

## 2022-11-15 DIAGNOSIS — Z65.3 PROBLEMS RELATED TO OTHER LEGAL CIRCUMSTANCES: ICD-10-CM

## 2022-11-15 DIAGNOSIS — F43.10 PTSD (POST-TRAUMATIC STRESS DISORDER): ICD-10-CM

## 2022-11-15 PROCEDURE — 99214 OFFICE O/P EST MOD 30 MIN: CPT | Performed by: STUDENT IN AN ORGANIZED HEALTH CARE EDUCATION/TRAINING PROGRAM

## 2022-11-15 RX ORDER — AZELASTINE 1 MG/ML
1 SPRAY, METERED NASAL 2 TIMES DAILY
Qty: 30 ML | Refills: 1 | Status: SHIPPED | OUTPATIENT
Start: 2022-11-15 | End: 2023-03-29

## 2022-11-15 ASSESSMENT — ASTHMA QUESTIONNAIRES: ACT_TOTALSCORE: 17

## 2022-11-15 ASSESSMENT — ANXIETY QUESTIONNAIRES
GAD7 TOTAL SCORE: 10
2. NOT BEING ABLE TO STOP OR CONTROL WORRYING: MORE THAN HALF THE DAYS
1. FEELING NERVOUS, ANXIOUS, OR ON EDGE: SEVERAL DAYS
5. BEING SO RESTLESS THAT IT IS HARD TO SIT STILL: NOT AT ALL
3. WORRYING TOO MUCH ABOUT DIFFERENT THINGS: MORE THAN HALF THE DAYS
7. FEELING AFRAID AS IF SOMETHING AWFUL MIGHT HAPPEN: SEVERAL DAYS
6. BECOMING EASILY ANNOYED OR IRRITABLE: MORE THAN HALF THE DAYS
GAD7 TOTAL SCORE: 10
IF YOU CHECKED OFF ANY PROBLEMS ON THIS QUESTIONNAIRE, HOW DIFFICULT HAVE THESE PROBLEMS MADE IT FOR YOU TO DO YOUR WORK, TAKE CARE OF THINGS AT HOME, OR GET ALONG WITH OTHER PEOPLE: SOMEWHAT DIFFICULT

## 2022-11-15 ASSESSMENT — PATIENT HEALTH QUESTIONNAIRE - PHQ9
5. POOR APPETITE OR OVEREATING: MORE THAN HALF THE DAYS
SUM OF ALL RESPONSES TO PHQ QUESTIONS 1-9: 7

## 2022-11-15 NOTE — PATIENT INSTRUCTIONS
Send in the court paperwork!    Dr. Bond will talk to Napoleon alvarez .  Referral to see a therapist, we will call with information in the next 1 week.   Find some quiet time for you (10min a day)    For allergies:  --new nasal spray (aselastine)  --Keep taking all of the old medications too!  --humidifier or steam will help.      Come back in 1 month.                        November 17, 2022   Legal Services Referral - [place] only  Legal referral has been sent to Osei Rodriguez from [facility].     Scan/Send demographics and referral to [email or web address]    Oesi will review, advise, and contact the patient.     Yue Espinoza     11/17/22   OTOLARYNGOLOGY REFERRAL     [facility]  Phone: [phone]  Fax: [phone]    Surgery Scheduling can be reached at [phone].    Referral, demographics and office note faxed to [phone]. They will contact pt to schedule.    Yue Espinoza
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
## 2022-11-21 ENCOUNTER — HEALTH MAINTENANCE LETTER (OUTPATIENT)
Age: 32
End: 2022-11-21

## 2022-11-21 ENCOUNTER — TELEPHONE (OUTPATIENT)
Dept: FAMILY MEDICINE | Facility: CLINIC | Age: 32
End: 2022-11-21
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
## 2022-12-13 DIAGNOSIS — F43.22 ADJUSTMENT DISORDER WITH ANXIOUS MOOD: ICD-10-CM

## 2022-12-13 DIAGNOSIS — F43.10 PTSD (POST-TRAUMATIC STRESS DISORDER): Primary | ICD-10-CM

## 2022-12-13 RX ORDER — ESCITALOPRAM OXALATE 10 MG/1
10 TABLET ORAL DAILY
Qty: 30 TABLET | Refills: 1 | Status: SHIPPED | OUTPATIENT
Start: 2022-12-13 | End: 2023-03-29

## 2023-01-09 ENCOUNTER — TELEPHONE (OUTPATIENT)
Dept: NURSING | Facility: CLINIC | Age: 33
End: 2023-01-09

## 2023-01-09 NOTE — TELEPHONE ENCOUNTER
"Patient calling to inquire about letter that was to be written by PCP regarding her PTSD. FNA able to see letter in chart; call to medical records who were able to \"push\" letter through to patient MyChart.    Patient received notification that she received an update to her MyChart. Patient encouraged to call Medical Records for additional assistance if she is having further issues.    Did not triage.    Oma Arroyo RN on 1/9/2023 at 1:53 PM    "

## 2023-01-26 ENCOUNTER — OFFICE VISIT (OUTPATIENT)
Dept: OTOLARYNGOLOGY | Facility: CLINIC | Age: 33
End: 2023-01-26
Payer: COMMERCIAL

## 2023-01-26 DIAGNOSIS — J30.89 NON-SEASONAL ALLERGIC RHINITIS DUE TO OTHER ALLERGIC TRIGGER: ICD-10-CM

## 2023-01-26 DIAGNOSIS — J32.4 CHRONIC PANSINUSITIS: Primary | ICD-10-CM

## 2023-01-26 PROCEDURE — 99202 OFFICE O/P NEW SF 15 MIN: CPT | Performed by: OTOLARYNGOLOGY

## 2023-01-26 NOTE — LETTER
1/26/2023         RE: Donna Larose  [address]  [place] [address]        Dear Colleague,    Thank you for referring your patient, Donna Larose, to the [facility]. Please see a copy of my visit note below.    HPI: This patient is a 31yo F who presents for evaluation of nasal congestion at the request of Dr. Bond. The patient reports chronic nasal congestion year round for many years. There have not really been episodes of high fever, localized sinus pain, tooth pain, and pururlent drainage. Has a prescription for nasal steroids, but not using them on a routine basis; not using any nasal saline. Denies dental grinding/clenching.    Past medical history, surgical history, social history, family history, medications, and allergies have been reviewed with the patient and are documented above.    Review of Systems: a 10-system review was performed. Pertinent positives are noted in the HPI and on a separate scanned document in the chart.    PHYSICAL EXAMINATION:  GEN: no acute distress, normocephalic  EYES: extraocular movements are intact, pupils are equal and round. Sclera clear.   EARS: auricles are normally formed. The external auditory canals are clear with minimal to no cerumen. Tympanic membranes are intact bilaterally with no signs of infection, effusion, retractions, or perforations.  NOSE: anterior nares are patent. There are no masses or lesions. The septum is non-obstructing. The turbinates (inferior and middle) are very boggy and bluish. No obvious polyps. Voice is profoundly hyponasal, more so than her exam would suggest.  OC/OP: clear, dentition is in good repair. The tongue and palate are fully mobile and symmetric. No masses or lesions.  NECK: soft and supple. No lymphadenopathy or masses. Airway is midline.  NEURO: CN VII and XII symmetric. alert and oriented. No spontaneous nystagmus. Gait is normal.  PULM: breathing comfortably on room  air, normal chest expansion with respiration  CARDS: no cyanosis or clubbing. Normal carotid pulses.    MEDICAL DECISION-MAKING: This patient is a 31yo F with allergic rhinitis, congestion, and possible polyps/chronic sinus obstruction. Will get a CT of the sinuses and contact her with the results when available. If clear, will refer to Allergy.           Again, thank you for allowing me to participate in the care of your patient.        Sincerely,        Amaya Grant MD

## 2023-01-26 NOTE — PROGRESS NOTES
HPI: This patient is a 33yo F who presents for evaluation of nasal congestion at the request of Dr. Bond. The patient reports chronic nasal congestion year round for many years. There have not really been episodes of high fever, localized sinus pain, tooth pain, and pururlent drainage. Has a prescription for nasal steroids, but not using them on a routine basis; not using any nasal saline. Denies dental grinding/clenching.    Past medical history, surgical history, social history, family history, medications, and allergies have been reviewed with the patient and are documented above.    Review of Systems: a 10-system review was performed. Pertinent positives are noted in the HPI and on a separate scanned document in the chart.    PHYSICAL EXAMINATION:  GEN: no acute distress, normocephalic  EYES: extraocular movements are intact, pupils are equal and round. Sclera clear.   EARS: auricles are normally formed. The external auditory canals are clear with minimal to no cerumen. Tympanic membranes are intact bilaterally with no signs of infection, effusion, retractions, or perforations.  NOSE: anterior nares are patent. There are no masses or lesions. The septum is non-obstructing. The turbinates (inferior and middle) are very boggy and bluish. No obvious polyps. Voice is profoundly hyponasal, more so than her exam would suggest.  OC/OP: clear, dentition is in good repair. The tongue and palate are fully mobile and symmetric. No masses or lesions.  NECK: soft and supple. No lymphadenopathy or masses. Airway is midline.  NEURO: CN VII and XII symmetric. alert and oriented. No spontaneous nystagmus. Gait is normal.  PULM: breathing comfortably on room air, normal chest expansion with respiration  CARDS: no cyanosis or clubbing. Normal carotid pulses.    MEDICAL DECISION-MAKING: This patient is a 33yo F with allergic rhinitis, congestion, and possible polyps/chronic sinus obstruction. Will get a CT of the sinuses and  contact her with the results when available. If clear, will refer to Allergy.

## 2023-01-31 ENCOUNTER — TELEPHONE (OUTPATIENT)
Dept: OTOLARYNGOLOGY | Facility: CLINIC | Age: 33
End: 2023-01-31
Payer: COMMERCIAL

## 2023-01-31 ENCOUNTER — HOSPITAL ENCOUNTER (OUTPATIENT)
Dept: CT IMAGING | Facility: HOSPITAL | Age: 33
Discharge: HOME | End: 2023-01-31
Attending: OTOLARYNGOLOGY | Admitting: OTOLARYNGOLOGY
Payer: COMMERCIAL

## 2023-01-31 DIAGNOSIS — J32.4 CHRONIC PANSINUSITIS: ICD-10-CM

## 2023-01-31 PROCEDURE — 70486 CT MAXILLOFACIAL W/O DYE: CPT

## 2023-01-31 NOTE — TELEPHONE ENCOUNTER
Tried to contact Donna today with regards to her scan results. No answer and no VM. Will have my staff attempt to reach her another time to scheduled a f/u appointment to discuss her scan in the office as there are some things to go over and ways that we can improve her situation. Those conversations are easier had in person where we can view her CT together.

## 2023-02-03 ENCOUNTER — TELEPHONE (OUTPATIENT)
Dept: OTOLARYNGOLOGY | Facility: CLINIC | Age: 33
End: 2023-02-03
Payer: COMMERCIAL

## 2023-02-03 NOTE — TELEPHONE ENCOUNTER
Pt calling clinic back. Pt states the number clinic was trying to reach her was her old number [phone]. FNA updated phone # on file.    FNA relayed message below and agreed to be transferred to ENT team for scheduling.    Mary Dodd RN/[place] Nurse Advisor

## 2023-02-03 NOTE — TELEPHONE ENCOUNTER
[facility] Call Center    Phone Message    May a detailed message be left on voicemail: yes     Reason for Call: Other: Per pt refused to set up appt in May even after being offered the wait list. Please call to discuss. Thank you     Action Taken: Message routed to:  [facility] ENT    Travel Screening: Not Applicable

## 2023-02-03 NOTE — TELEPHONE ENCOUNTER
Spoke with Donna and got pt scheduled with an ENT follow up appointment. Pt expressed understanding.    [facility]      Jo Burciaga RN  [facility]  ENT  [address] [address]  [email or web address]   Office:[phone]  Employed by [facility]

## 2023-02-09 ENCOUNTER — OFFICE VISIT (OUTPATIENT)
Dept: OTOLARYNGOLOGY | Facility: CLINIC | Age: 33
End: 2023-02-09
Payer: COMMERCIAL

## 2023-02-09 DIAGNOSIS — J34.89 CONCHA BULLOSA: ICD-10-CM

## 2023-02-09 DIAGNOSIS — J34.3 NASAL TURBINATE HYPERTROPHY: ICD-10-CM

## 2023-02-09 DIAGNOSIS — J32.0 CHRONIC MAXILLARY SINUSITIS: ICD-10-CM

## 2023-02-09 DIAGNOSIS — J34.89 OSTIOMEATAL COMPLEX OBSTRUCTION OF PARANASAL SINUS: Primary | ICD-10-CM

## 2023-02-09 PROCEDURE — 99214 OFFICE O/P EST MOD 30 MIN: CPT | Performed by: OTOLARYNGOLOGY

## 2023-02-09 NOTE — PROGRESS NOTES
HPI: This patient is a 31yo F who presents for follow-up of nasal congestion after a CT of her sinuses. The patient reports chronic nasal congestion year round for many years. There have not really been episodes of high fever, localized sinus pain, tooth pain, and pururlent drainage. Has a prescription for nasal steroids, but not using them on a routine basis; not using any nasal saline. Denies dental grinding/clenching.     Past medical history, surgical history, social history, family history, medications, and allergies have been reviewed with the patient and are documented above.     Review of Systems: a 10-system review was performed. Pertinent positives are noted in the HPI and on a separate scanned document in the chart.     PHYSICAL EXAMINATION observation only):  GEN: no acute distress, normocephalic  EYES: extraocular movements are intact, pupils are equal and round. Sclera clear.   EARS: auricles are normally formed.   NOSE (from previous exam): anterior nares are patent. There are no masses or lesions. The septum is non-obstructing. The turbinates (inferior and middle) are very boggy and bluish. No obvious polyps. Voice is profoundly hyponasal, more so than her exam would suggest.  NECK: supple. Airway is midline.  NEURO: CN VII and XII symmetric. alert and oriented. No spontaneous nystagmus. Gait is normal.  PULM: breathing comfortably on room air, normal chest expansion with respiration    CT SINUS:  IMPRESSION:  1.  Subtotal opacification of the left maxillary sinus extending into the adjacent nasal cavity with mildly hyperdense internal contents and mild diffuse osteoarthritis of its wall, with overall appearance suggesting acute on chronic sinusitis.  2.  Additional mild to moderate mucosal thickening throughout the remaining paranasal sinuses.  3.  Mucosal opacification of the left ostiomeatal unit. Prominent right ethmoid bulla narrows the right hiatus semilunaris. Mucosal narrowing of the left  frontoethmoidal recess and left sphenoid ostium.  4.  Broad leftward deviation of the nasal septum and partially fluid opacified bilateral middle nasal turbinates, right greater than left, moderately narrow of the anterior/mid left nasal cavity.     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 There is complete opacification of the left maxillary sinus in addition to inferior turbinate hypertrophy on both sides. Offered a mini-FESS to open the anterior sinus drainage pathways in addition to resecting the vishal bullosa and doing a PRITs. The combination of these things should really improve her nasal breathing. The septal deviation is quite mild, so I am not of the strong opinion that fixing this will move the needle far if at all. She is in agreement with deferring a septoplasty.

## 2023-02-09 NOTE — LETTER
2/9/2023         RE: Donna Larose  [address]  [place] [address]        Dear Colleague,    Thank you for referring your patient, Donna Larose, to the [facility]. Please see a copy of my visit note below.    HPI: This patient is a 33yo F who presents for follow-up of nasal congestion after a CT of her sinuses. The patient reports chronic nasal congestion year round for many years. There have not really been episodes of high fever, localized sinus pain, tooth pain, and pururlent drainage. Has a prescription for nasal steroids, but not using them on a routine basis; not using any nasal saline. Denies dental grinding/clenching.     Past medical history, surgical history, social history, family history, medications, and allergies have been reviewed with the patient and are documented above.     Review of Systems: a 10-system review was performed. Pertinent positives are noted in the HPI and on a separate scanned document in the chart.     PHYSICAL EXAMINATION observation only):  GEN: no acute distress, normocephalic  EYES: extraocular movements are intact, pupils are equal and round. Sclera clear.   EARS: auricles are normally formed.   NOSE (from previous exam): anterior nares are patent. There are no masses or lesions. The septum is non-obstructing. The turbinates (inferior and middle) are very boggy and bluish. No obvious polyps. Voice is profoundly hyponasal, more so than her exam would suggest.  NECK: supple. Airway is midline.  NEURO: CN VII and XII symmetric. alert and oriented. No spontaneous nystagmus. Gait is normal.  PULM: breathing comfortably on room air, normal chest expansion with respiration    CT SINUS:  IMPRESSION:  1.  Subtotal opacification of the left maxillary sinus extending into the adjacent nasal cavity with mildly hyperdense internal contents and mild diffuse osteoarthritis of its wall, with overall appearance suggesting acute on  chronic sinusitis.  2.  Additional mild to moderate mucosal thickening throughout the remaining paranasal sinuses.  3.  Mucosal opacification of the left ostiomeatal unit. Prominent right ethmoid bulla narrows the right hiatus semilunaris. Mucosal narrowing of the left frontoethmoidal recess and left sphenoid ostium.  4.  Broad leftward deviation of the nasal septum and partially fluid opacified bilateral middle nasal turbinates, right greater than left, moderately narrow of the anterior/mid left nasal cavity.     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 There is complete opacification of the left maxillary sinus in addition to inferior turbinate hypertrophy on both sides. Offered a mini-FESS to open the anterior sinus drainage pathways in addition to resecting the vishal bullosa and doing a PRITs. The combination of these things should really improve her nasal breathing. The septal deviation is quite mild, so I am not of the strong opinion that fixing this will move the needle far if at all. She is in agreement with deferring a septoplasty.      Again, thank you for allowing me to participate in the care of your patient.        Sincerely,        Amaya Grant MD

## 2023-02-14 ENCOUNTER — TELEPHONE (OUTPATIENT)
Dept: OTOLARYNGOLOGY | Facility: CLINIC | Age: 33
End: 2023-02-14
Payer: MEDICAID

## 2023-02-14 PROBLEM — J34.89 OSTIOMEATAL COMPLEX OBSTRUCTION OF PARANASAL SINUS: Status: ACTIVE | Noted: 2023-02-14

## 2023-02-14 PROBLEM — J34.3 NASAL TURBINATE HYPERTROPHY: Status: ACTIVE | Noted: 2023-02-14

## 2023-02-14 PROBLEM — J32.0 CHRONIC MAXILLARY SINUSITIS: Status: ACTIVE | Noted: 2023-02-14

## 2023-02-14 PROBLEM — J34.89 CONCHA BULLOSA: Status: ACTIVE | Noted: 2023-02-14

## 2023-02-14 NOTE — LETTER
We've received instruction to get you scheduled for POLYPECTOMY WITH BEVERLEY BULLOSA RESECTION and TURBINOPLASTY, ENDOSCOPIC with Dr Grant. We have that arranged as follows:     Pre-op Physical:  Call your primary clinic to schedule.    Surgery Date: 4/3/2023     Location: [facility] [address] [place], [place] [address]    Approximate Arrival Time: 11:00 am  (Unless instructed differently by the pre-op call nurse)     Post op Appointment: 4/18/2023 3:00 pm  At [facility], [address] [place] [address] [address], [place] [address].    Prep Tasks and Info:     1. Schedule a pre-op physical with your primary care doctor within 30 days of surgery. This is required by anesthesia and if not done, your surgery will be cancelled. Call them asap to get this scheduled.    2. Review your medications with your primary care or prescribing physician; they will advise you which meds to stop and when, and when you can resume taking.  Certain medications like blood thinners, need to be stopped in advance of surgery to proceed safely.    3. You must get tested for COVID-19, even if you are vaccinated.    Outpatient Surgery:  If you are going home the same day of surgery, a home rapid antigen Covid-19 test is required 1-2 days before surgery- regardless of your vaccination status.  Take a photo of the negative result and show to the nurse on the day of surgery. If you test positive, contact our office right away to reschedule surgery. You can buy a home Covid-19 Rapid Antigen test at many local pharmacies, or you can order for free at covid.gov/tests.      4. Please shower the evening before and morning of surgery with Hibiclens or Exidine soap.  This can be found at your local pharmacy. Follow the links below for detailed instructions for preoperative skin preparation:     Showering Before [id number]      Preparing Your Skin Before Surgery - When you can't take a  [id number]     5. Fasting instructions will be provided by the pre-op nurse who will call you 1-3 days before surgery.  Typically we advise normal food up to 8 hours before you arrive for surgery. Clear liquids only from then until 2 hours before you arrive surgery then nothing at all by mouth.  The nurse will review your specific instructions with you at the call.     6. Smoking impacts your body's ability to heal properly.  If you are a smoker, we strongly urge you to stop smoking 4-6 weeks before surgery.    7. You will need an adult to drive you home and stay with you 24 hours after surgery. Public transportation or Medical Van Services are not permitted.    8. You may have one family member wait in the lobby at the surgery center during your surgery. Visitor restrictions are subject to change, please verify with the pre-op nurse when they call.    9. If the community sees a new COVID19 surge, your procedure may need to be postponed. We will contact you if this happens. You will be screened for high-risk exposure to Covid-19 during the pre-op call.  We encourage you to quarantine yourself away from any Covid-19 people for 10 days before surgery to avoid possible last minute cancellations.   When you arrive to the surgery center, you will again be screened for COVID19 symptoms. If you screen positive, your surgery will need to be postponed.    10. We always encourage you to notify your insurance any time you have medical tests or procedures scheduled including surgery. The number is usually right on the back of your insurance card. Please call [facility] Cost of Care at [phone] if you'd like a surgery quote.      Preparing for Your Surgery [id number]     Call our office if you have any questions! Thank you!

## 2023-02-14 NOTE — TELEPHONE ENCOUNTER
Spoke with patient today regarding surgery scheduling     Went over details/instructions.    Surgery Letter sent via [facility]

## 2023-02-16 ASSESSMENT — ANXIETY QUESTIONNAIRES
GAD7 TOTAL SCORE: 10
GAD7 TOTAL SCORE: 10
4. TROUBLE RELAXING: SEVERAL DAYS
7. FEELING AFRAID AS IF SOMETHING AWFUL MIGHT HAPPEN: SEVERAL DAYS
7. FEELING AFRAID AS IF SOMETHING AWFUL MIGHT HAPPEN: SEVERAL DAYS
6. BECOMING EASILY ANNOYED OR IRRITABLE: NOT AT ALL
1. FEELING NERVOUS, ANXIOUS, OR ON EDGE: SEVERAL DAYS
IF YOU CHECKED OFF ANY PROBLEMS ON THIS QUESTIONNAIRE, HOW DIFFICULT HAVE THESE PROBLEMS MADE IT FOR YOU TO DO YOUR WORK, TAKE CARE OF THINGS AT HOME, OR GET ALONG WITH OTHER PEOPLE: SOMEWHAT DIFFICULT
GAD7 TOTAL SCORE: 10
5. BEING SO RESTLESS THAT IT IS HARD TO SIT STILL: SEVERAL DAYS
3. WORRYING TOO MUCH ABOUT DIFFERENT THINGS: NEARLY EVERY DAY
2. NOT BEING ABLE TO STOP OR CONTROL WORRYING: NEARLY EVERY DAY
8. IF YOU CHECKED OFF ANY PROBLEMS, HOW DIFFICULT HAVE THESE MADE IT FOR YOU TO DO YOUR WORK, TAKE CARE OF THINGS AT HOME, OR GET ALONG WITH OTHER PEOPLE?: SOMEWHAT DIFFICULT

## 2023-02-16 ASSESSMENT — PATIENT HEALTH QUESTIONNAIRE - PHQ9
SUM OF ALL RESPONSES TO PHQ QUESTIONS 1-9: 13
SUM OF ALL RESPONSES TO PHQ QUESTIONS 1-9: 13
10. IF YOU CHECKED OFF ANY PROBLEMS, HOW DIFFICULT HAVE THESE PROBLEMS MADE IT FOR YOU TO DO YOUR WORK, TAKE CARE OF THINGS AT HOME, OR GET ALONG WITH OTHER PEOPLE: SOMEWHAT DIFFICULT

## 2023-02-17 ENCOUNTER — VIRTUAL VISIT (OUTPATIENT)
Dept: PSYCHOLOGY | Facility: CLINIC | Age: 33
End: 2023-02-17
Attending: STUDENT IN AN ORGANIZED HEALTH CARE EDUCATION/TRAINING PROGRAM
Payer: COMMERCIAL

## 2023-02-17 DIAGNOSIS — F43.10 PTSD (POST-TRAUMATIC STRESS DISORDER): ICD-10-CM

## 2023-02-17 PROCEDURE — 90834 PSYTX W PT 45 MINUTES: CPT | Mod: VID

## 2023-02-17 ASSESSMENT — COLUMBIA-SUICIDE SEVERITY RATING SCALE - C-SSRS
ATTEMPT LIFETIME: NO
2. HAVE YOU ACTUALLY HAD ANY THOUGHTS OF KILLING YOURSELF?: NO
1. HAVE YOU WISHED YOU WERE DEAD OR WISHED YOU COULD GO TO SLEEP AND NOT WAKE UP?: NO
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
## 2023-03-29 ENCOUNTER — OFFICE VISIT (OUTPATIENT)
Dept: FAMILY MEDICINE | Facility: CLINIC | Age: 33
End: 2023-03-29
Payer: COMMERCIAL

## 2023-03-29 VITALS
BODY MASS INDEX: 29.86 KG/M2 | DIASTOLIC BLOOD PRESSURE: 77 MMHG | HEART RATE: 85 BPM | OXYGEN SATURATION: 98 % | HEIGHT: 68 IN | TEMPERATURE: 98.2 F | WEIGHT: 197 LBS | SYSTOLIC BLOOD PRESSURE: 138 MMHG | RESPIRATION RATE: 20 BRPM

## 2023-03-29 DIAGNOSIS — Z01.818 PREOPERATIVE EXAMINATION: Primary | ICD-10-CM

## 2023-03-29 DIAGNOSIS — J32.0 CHRONIC MAXILLARY SINUSITIS: ICD-10-CM

## 2023-03-29 DIAGNOSIS — Z01.818 PREOP GENERAL PHYSICAL EXAM: ICD-10-CM

## 2023-03-29 DIAGNOSIS — J45.40 MODERATE PERSISTENT ASTHMA WITHOUT COMPLICATION: ICD-10-CM

## 2023-03-29 LAB — HGB BLD-MCNC: 12.1 G/DL (ref 11.7–15.7)

## 2023-03-29 PROCEDURE — 99214 OFFICE O/P EST MOD 30 MIN: CPT | Performed by: STUDENT IN AN ORGANIZED HEALTH CARE EDUCATION/TRAINING PROGRAM

## 2023-03-29 PROCEDURE — 85018 HEMOGLOBIN: CPT | Performed by: STUDENT IN AN ORGANIZED HEALTH CARE EDUCATION/TRAINING PROGRAM

## 2023-03-29 PROCEDURE — 36415 COLL VENOUS BLD VENIPUNCTURE: CPT | Performed by: STUDENT IN AN ORGANIZED HEALTH CARE EDUCATION/TRAINING PROGRAM

## 2023-03-29 ASSESSMENT — ANXIETY QUESTIONNAIRES
5. BEING SO RESTLESS THAT IT IS HARD TO SIT STILL: NOT AT ALL
GAD7 TOTAL SCORE: 1
GAD7 TOTAL SCORE: 1
IF YOU CHECKED OFF ANY PROBLEMS ON THIS QUESTIONNAIRE, HOW DIFFICULT HAVE THESE PROBLEMS MADE IT FOR YOU TO DO YOUR WORK, TAKE CARE OF THINGS AT HOME, OR GET ALONG WITH OTHER PEOPLE: NOT DIFFICULT AT ALL
7. FEELING AFRAID AS IF SOMETHING AWFUL MIGHT HAPPEN: NOT AT ALL
6. BECOMING EASILY ANNOYED OR IRRITABLE: NOT AT ALL
3. WORRYING TOO MUCH ABOUT DIFFERENT THINGS: SEVERAL DAYS
2. NOT BEING ABLE TO STOP OR CONTROL WORRYING: NOT AT ALL
1. FEELING NERVOUS, ANXIOUS, OR ON EDGE: NOT AT ALL

## 2023-03-29 ASSESSMENT — ASTHMA QUESTIONNAIRES: ACT_TOTALSCORE: 18

## 2023-03-29 ASSESSMENT — PATIENT HEALTH QUESTIONNAIRE - PHQ9
SUM OF ALL RESPONSES TO PHQ QUESTIONS 1-9: 0
5. POOR APPETITE OR OVEREATING: NOT AT ALL
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
## 2023-03-29 NOTE — PATIENT INSTRUCTIONS
Take all of your inhalers on the day of surgery, and bring them with you to the surgery center.    Preparing for Your Surgery  Getting started  A nurse will call you to review your health history and instructions. They will give you an arrival time based on your scheduled surgery time. Please be ready to share:  Your doctor's clinic name and phone number  Your medical, surgical, and anesthesia history  A list of allergies and sensitivities  A list of medicines, including herbal treatments and over-the-counter drugs  Whether the patient has a legal guardian (ask how to send us the papers in advance)  Please tell us if you're pregnant--or if there's any chance you might be pregnant. Some surgeries may injure a fetus (unborn baby), so they require a pregnancy test. Surgeries that are safe for a fetus don't always need a test, and you can choose whether to have one.   If you have a child who's having surgery, please ask for a copy of Preparing for Your Child's Surgery.    Preparing for surgery  Within 10 to 30 days of surgery: Have a pre-op exam (sometimes called an H&P, or History and Physical). This can be done at a clinic or pre-operative center.  If you're having a , you may not need this exam. Talk to your care team.  At your pre-op exam, talk to your care team about all medicines you take. If you need to stop any medicines before surgery, ask when to start taking them again.  We do this for your safety. Many medicines can make you bleed too much during surgery. Some change how well surgery (anesthesia) drugs work.  Call your insurance company to let them know you're having surgery. (If you don't have insurance, call [phone].)  Call your clinic if there's any change in your health. This includes signs of a cold or flu (sore throat, runny nose, cough, rash, fever). It also includes a scrape or scratch near the surgery site.  If you have questions on the day of surgery, call your hospital or surgery  center.  Eating and drinking guidelines  For your safety: Unless your surgeon tells you otherwise, follow the guidelines below.  Eat and drink as usual until 8 hours before you arrive for surgery. After that, no food or milk.  Drink clear liquids until 2 hours before you arrive. These are liquids you can see through, like water, Gatorade, and Propel Water. They also include plain black coffee and tea (no cream or milk), candy, and breath mints. You can spit out gum when you arrive.  If you drink alcohol: Stop drinking it the night before surgery.  If your care team tells you to take medicine on the morning of surgery, it's okay to take it with a sip of water.  Preventing infection  Shower or bathe the night before and morning of your surgery. Follow the instructions your clinic gave you. (If no instructions, use regular soap.)  Don't shave or clip hair near your surgery site. We'll remove the hair if needed.  Don't smoke or vape the morning of surgery. You may chew nicotine gum up to 2 hours before surgery. A nicotine patch is okay.  Note: Some surgeries require you to completely quit smoking and nicotine. Check with your surgeon.  Your care team will make every effort to keep you safe from infection. We will:  Clean our hands often with soap and water (or an alcohol-based hand rub).  Clean the skin at your surgery site with a special soap that kills germs.  Give you a special gown to keep you warm. (Cold raises the risk of infection.)  Wear special hair covers, masks, gowns and gloves during surgery.  Give antibiotic medicine, if prescribed. Not all surgeries need antibiotics.  What to bring on the day of surgery  Photo ID and insurance card  Copy of your health care directive, if you have one  Glasses and hearing aids (bring cases)  You can't wear contacts during surgery  Inhaler and eye drops, if you use them (tell us about these when you arrive)  CPAP machine or breathing device, if you use them  A few personal  items, if spending the night  If you have . . .  A pacemaker, ICD (cardiac defibrillator) or other implant: Bring the ID card.  An implanted stimulator: Bring the remote control.  A legal guardian: Bring a copy of the certified (court-stamped) guardianship papers.  Please remove any jewelry, including body piercings. Leave jewelry and other valuables at home.  If you're going home the day of surgery  You must have a responsible adult drive you home. They should stay with you overnight as well.  If you don't have someone to stay with you, and you aren't safe to go home alone, we may keep you overnight. Insurance often won't pay for this.  After surgery  If it's hard to control your pain or you need more pain medicine, please call your surgeon's office.  Questions?   If you have any questions for your care team, list them here: _________________________________________________________________________________________________________________________________________________________________________ ____________________________________ ____________________________________ ____________________________________

## 2023-03-29 NOTE — LETTER
March 30, 2023      Donna Larose  [address] [facility] [address]        Dear man,    We are writing to inform you of your test results.    Your hemoglobin is normal. This is good news.  Good luck with surgery. Please call the clinic at [phone] if you have any questions.        Resulted Orders   Hemoglobin   Result Value Ref Range    Hemoglobin 12.1 11.7 - 15.7 g/dL       If you have any questions or concerns, please call the clinic at the number listed above.       Sincerely,      Cammie Bond MD
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
## 2023-03-30 ENCOUNTER — ANESTHESIA EVENT (OUTPATIENT)
Dept: SURGERY | Facility: AMBULATORY SURGERY CENTER | Age: 33
End: 2023-03-30
Payer: COMMERCIAL

## 2023-03-30 NOTE — RESULT ENCOUNTER NOTE
Donna Larose-    Your hemoglobin is normal. This is good news.  Good luck with surgery. Please call the clinic at [phone] if you have any questions.      Cammie Bond MD    Please send results to patient.

## 2023-04-03 ENCOUNTER — ANESTHESIA (OUTPATIENT)
Dept: SURGERY | Facility: AMBULATORY SURGERY CENTER | Age: 33
End: 2023-04-03
Payer: COMMERCIAL

## 2023-04-03 ENCOUNTER — HOSPITAL ENCOUNTER (OUTPATIENT)
Facility: AMBULATORY SURGERY CENTER | Age: 33
Discharge: HOME | End: 2023-04-03
Attending: OTOLARYNGOLOGY
Payer: COMMERCIAL

## 2023-04-03 VITALS
OXYGEN SATURATION: 100 % | SYSTOLIC BLOOD PRESSURE: 154 MMHG | TEMPERATURE: 97.5 F | RESPIRATION RATE: 16 BRPM | HEART RATE: 79 BPM | WEIGHT: 197 LBS | HEIGHT: 68 IN | BODY MASS INDEX: 29.86 KG/M2 | DIASTOLIC BLOOD PRESSURE: 101 MMHG

## 2023-04-03 DIAGNOSIS — J34.3 NASAL TURBINATE HYPERTROPHY: ICD-10-CM

## 2023-04-03 DIAGNOSIS — J34.89 OSTIOMEATAL COMPLEX OBSTRUCTION OF PARANASAL SINUS: ICD-10-CM

## 2023-04-03 DIAGNOSIS — J32.0 CHRONIC MAXILLARY SINUSITIS: ICD-10-CM

## 2023-04-03 DIAGNOSIS — J34.89 CONCHA BULLOSA: ICD-10-CM

## 2023-04-03 PROCEDURE — 30140 RESECT INFERIOR TURBINATE: CPT | Mod: 50 | Performed by: OTOLARYNGOLOGY

## 2023-04-03 PROCEDURE — 31256 EXPLORATION MAXILLARY SINUS: CPT | Mod: 50 | Performed by: OTOLARYNGOLOGY

## 2023-04-03 PROCEDURE — 31240 NSL/SNS NDSC CNCH BULL RESCJ: CPT | Mod: 50 | Performed by: OTOLARYNGOLOGY

## 2023-04-03 DEVICE — IMPLANTABLE DEVICE: Type: IMPLANTABLE DEVICE | Site: NOSE | Status: FUNCTIONAL

## 2023-04-03 RX ORDER — OXYMETAZOLINE HYDROCHLORIDE 0.05 G/100ML
SPRAY NASAL PRN
Status: DISCONTINUED | OUTPATIENT
Start: 2023-04-03 | End: 2023-04-03 | Stop reason: HOSPADM

## 2023-04-03 RX ORDER — MAGNESIUM HYDROXIDE 1200 MG/15ML
LIQUID ORAL PRN
Status: DISCONTINUED | OUTPATIENT
Start: 2023-04-03 | End: 2023-04-03 | Stop reason: HOSPADM

## 2023-04-03 RX ORDER — SODIUM CHLORIDE, SODIUM LACTATE, POTASSIUM CHLORIDE, CALCIUM CHLORIDE 600; 310; 30; 20 MG/100ML; MG/100ML; MG/100ML; MG/100ML
INJECTION, SOLUTION INTRAVENOUS CONTINUOUS
Status: DISCONTINUED | OUTPATIENT
Start: 2023-04-03 | End: 2023-04-04 | Stop reason: HOSPADM

## 2023-04-03 RX ORDER — FENTANYL CITRATE 0.05 MG/ML
25 INJECTION, SOLUTION INTRAMUSCULAR; INTRAVENOUS EVERY 5 MIN PRN
Status: DISCONTINUED | OUTPATIENT
Start: 2023-04-03 | End: 2023-04-04 | Stop reason: HOSPADM

## 2023-04-03 RX ORDER — FENTANYL CITRATE 0.05 MG/ML
25 INJECTION, SOLUTION INTRAMUSCULAR; INTRAVENOUS
Status: DISCONTINUED | OUTPATIENT
Start: 2023-04-03 | End: 2023-04-04 | Stop reason: HOSPADM

## 2023-04-03 RX ORDER — ONDANSETRON 2 MG/ML
4 INJECTION INTRAMUSCULAR; INTRAVENOUS EVERY 30 MIN PRN
Status: DISCONTINUED | OUTPATIENT
Start: 2023-04-03 | End: 2023-04-04 | Stop reason: HOSPADM

## 2023-04-03 RX ORDER — ONDANSETRON 2 MG/ML
INJECTION INTRAMUSCULAR; INTRAVENOUS PRN
Status: DISCONTINUED | OUTPATIENT
Start: 2023-04-03 | End: 2023-04-03

## 2023-04-03 RX ORDER — DEXAMETHASONE SODIUM PHOSPHATE 10 MG/ML
INJECTION, SOLUTION INTRAMUSCULAR; INTRAVENOUS PRN
Status: DISCONTINUED | OUTPATIENT
Start: 2023-04-03 | End: 2023-04-03

## 2023-04-03 RX ORDER — OXYCODONE HYDROCHLORIDE 5 MG/1
5 TABLET ORAL
Status: COMPLETED | OUTPATIENT
Start: 2023-04-03 | End: 2023-04-03

## 2023-04-03 RX ORDER — ONDANSETRON 4 MG/1
4 TABLET, ORALLY DISINTEGRATING ORAL EVERY 30 MIN PRN
Status: DISCONTINUED | OUTPATIENT
Start: 2023-04-03 | End: 2023-04-04 | Stop reason: HOSPADM

## 2023-04-03 RX ORDER — DEXAMETHASONE SODIUM PHOSPHATE 10 MG/ML
10 INJECTION, SOLUTION INTRAMUSCULAR; INTRAVENOUS ONCE
Status: DISCONTINUED | OUTPATIENT
Start: 2023-04-03 | End: 2023-04-04 | Stop reason: HOSPADM

## 2023-04-03 RX ORDER — LIDOCAINE HYDROCHLORIDE 20 MG/ML
INJECTION, SOLUTION INFILTRATION; PERINEURAL PRN
Status: DISCONTINUED | OUTPATIENT
Start: 2023-04-03 | End: 2023-04-03

## 2023-04-03 RX ORDER — IBUPROFEN 600 MG/1
600 TABLET, FILM COATED ORAL
Status: COMPLETED | OUTPATIENT
Start: 2023-04-03 | End: 2023-04-03

## 2023-04-03 RX ORDER — ACETAMINOPHEN 325 MG/1
975 TABLET ORAL ONCE
Status: COMPLETED | OUTPATIENT
Start: 2023-04-03 | End: 2023-04-03

## 2023-04-03 RX ORDER — LIDOCAINE 40 MG/G
CREAM TOPICAL
Status: DISCONTINUED | OUTPATIENT
Start: 2023-04-03 | End: 2023-04-04 | Stop reason: HOSPADM

## 2023-04-03 RX ORDER — HYDRALAZINE HYDROCHLORIDE 20 MG/ML
10 INJECTION INTRAMUSCULAR; INTRAVENOUS ONCE
Status: COMPLETED | OUTPATIENT
Start: 2023-04-03 | End: 2023-04-03

## 2023-04-03 RX ORDER — OXYCODONE HYDROCHLORIDE 10 MG/1
10 TABLET ORAL
Status: DISCONTINUED | OUTPATIENT
Start: 2023-04-03 | End: 2023-04-04 | Stop reason: HOSPADM

## 2023-04-03 RX ORDER — FENTANYL CITRATE 0.05 MG/ML
50 INJECTION, SOLUTION INTRAMUSCULAR; INTRAVENOUS EVERY 5 MIN PRN
Status: DISCONTINUED | OUTPATIENT
Start: 2023-04-03 | End: 2023-04-04 | Stop reason: HOSPADM

## 2023-04-03 RX ORDER — LIDOCAINE HYDROCHLORIDE AND EPINEPHRINE 10; 10 MG/ML; UG/ML
INJECTION, SOLUTION INFILTRATION; PERINEURAL PRN
Status: DISCONTINUED | OUTPATIENT
Start: 2023-04-03 | End: 2023-04-03 | Stop reason: HOSPADM

## 2023-04-03 RX ORDER — HYDROMORPHONE HCL IN WATER/PF 6 MG/30 ML
0.2 PATIENT CONTROLLED ANALGESIA SYRINGE INTRAVENOUS EVERY 5 MIN PRN
Status: DISCONTINUED | OUTPATIENT
Start: 2023-04-03 | End: 2023-04-04 | Stop reason: HOSPADM

## 2023-04-03 RX ORDER — CEPHALEXIN 500 MG/1
500 CAPSULE ORAL 2 TIMES DAILY
Qty: 10 CAPSULE | Refills: 0 | Status: SHIPPED | OUTPATIENT
Start: 2023-04-03 | End: 2023-04-08

## 2023-04-03 RX ORDER — PROPOFOL 10 MG/ML
INJECTION, EMULSION INTRAVENOUS PRN
Status: DISCONTINUED | OUTPATIENT
Start: 2023-04-03 | End: 2023-04-03

## 2023-04-03 RX ORDER — LABETALOL HYDROCHLORIDE 5 MG/ML
INJECTION, SOLUTION INTRAVENOUS PRN
Status: DISCONTINUED | OUTPATIENT
Start: 2023-04-03 | End: 2023-04-03

## 2023-04-03 RX ORDER — NEOSTIGMINE METHYLSULFATE 1 MG/ML
VIAL (ML) INJECTION PRN
Status: DISCONTINUED | OUTPATIENT
Start: 2023-04-03 | End: 2023-04-03

## 2023-04-03 RX ORDER — PROPOFOL 10 MG/ML
INJECTION, EMULSION INTRAVENOUS CONTINUOUS PRN
Status: DISCONTINUED | OUTPATIENT
Start: 2023-04-03 | End: 2023-04-03

## 2023-04-03 RX ORDER — HYDROMORPHONE HCL IN WATER/PF 6 MG/30 ML
0.4 PATIENT CONTROLLED ANALGESIA SYRINGE INTRAVENOUS EVERY 5 MIN PRN
Status: DISCONTINUED | OUTPATIENT
Start: 2023-04-03 | End: 2023-04-04 | Stop reason: HOSPADM

## 2023-04-03 RX ORDER — GLYCOPYRROLATE 0.2 MG/ML
INJECTION, SOLUTION INTRAMUSCULAR; INTRAVENOUS PRN
Status: DISCONTINUED | OUTPATIENT
Start: 2023-04-03 | End: 2023-04-03

## 2023-04-03 RX ORDER — IBUPROFEN 600 MG/1
600 TABLET, FILM COATED ORAL EVERY 6 HOURS PRN
Qty: 20 TABLET | Refills: 0 | Status: SHIPPED | OUTPATIENT
Start: 2023-04-03 | End: 2023-04-08

## 2023-04-03 RX ORDER — CEFAZOLIN SODIUM 2 G/100ML
2 INJECTION, SOLUTION INTRAVENOUS
Status: COMPLETED | OUTPATIENT
Start: 2023-04-03 | End: 2023-04-03

## 2023-04-03 RX ORDER — FENTANYL CITRATE 50 UG/ML
INJECTION, SOLUTION INTRAMUSCULAR; INTRAVENOUS PRN
Status: DISCONTINUED | OUTPATIENT
Start: 2023-04-03 | End: 2023-04-03

## 2023-04-03 RX ADMIN — HYDRALAZINE HYDROCHLORIDE 10 MG: 20 INJECTION INTRAMUSCULAR; INTRAVENOUS at 12:34

## 2023-04-03 RX ADMIN — LIDOCAINE HYDROCHLORIDE 3 ML: 20 INJECTION, SOLUTION INFILTRATION; PERINEURAL at 10:07

## 2023-04-03 RX ADMIN — FENTANYL CITRATE 50 MCG: 50 INJECTION, SOLUTION INTRAMUSCULAR; INTRAVENOUS at 10:25

## 2023-04-03 RX ADMIN — PROPOFOL 200 MCG/KG/MIN: 10 INJECTION, EMULSION INTRAVENOUS at 10:07

## 2023-04-03 RX ADMIN — OXYCODONE HYDROCHLORIDE 5 MG: 5 TABLET ORAL at 12:49

## 2023-04-03 RX ADMIN — Medication 35 MG: at 10:07

## 2023-04-03 RX ADMIN — ACETAMINOPHEN 975 MG: 325 TABLET ORAL at 09:05

## 2023-04-03 RX ADMIN — IBUPROFEN 600 MG: 600 TABLET, FILM COATED ORAL at 13:33

## 2023-04-03 RX ADMIN — DEXAMETHASONE SODIUM PHOSPHATE 10 MG: 10 INJECTION, SOLUTION INTRAMUSCULAR; INTRAVENOUS at 10:07

## 2023-04-03 RX ADMIN — FENTANYL CITRATE 100 MCG: 50 INJECTION, SOLUTION INTRAMUSCULAR; INTRAVENOUS at 10:07

## 2023-04-03 RX ADMIN — PROPOFOL 50 MG: 10 INJECTION, EMULSION INTRAVENOUS at 10:26

## 2023-04-03 RX ADMIN — Medication 4.5 MG: at 11:06

## 2023-04-03 RX ADMIN — LABETALOL HYDROCHLORIDE 5 MG: 5 INJECTION, SOLUTION INTRAVENOUS at 10:45

## 2023-04-03 RX ADMIN — CEFAZOLIN SODIUM 2 G: 2 INJECTION, SOLUTION INTRAVENOUS at 10:03

## 2023-04-03 RX ADMIN — ONDANSETRON 4 MG: 2 INJECTION INTRAMUSCULAR; INTRAVENOUS at 13:38

## 2023-04-03 RX ADMIN — SODIUM CHLORIDE, SODIUM LACTATE, POTASSIUM CHLORIDE, CALCIUM CHLORIDE: 600; 310; 30; 20 INJECTION, SOLUTION INTRAVENOUS at 10:58

## 2023-04-03 RX ADMIN — GLYCOPYRROLATE 0.6 MG: 0.2 INJECTION, SOLUTION INTRAMUSCULAR; INTRAVENOUS at 11:06

## 2023-04-03 RX ADMIN — LABETALOL HYDROCHLORIDE 5 MG: 5 INJECTION, SOLUTION INTRAVENOUS at 10:30

## 2023-04-03 RX ADMIN — PROPOFOL 200 MG: 10 INJECTION, EMULSION INTRAVENOUS at 10:07

## 2023-04-03 RX ADMIN — SODIUM CHLORIDE, SODIUM LACTATE, POTASSIUM CHLORIDE, CALCIUM CHLORIDE: 600; 310; 30; 20 INJECTION, SOLUTION INTRAVENOUS at 09:05

## 2023-04-03 RX ADMIN — ONDANSETRON 4 MG: 2 INJECTION INTRAMUSCULAR; INTRAVENOUS at 10:33

## 2023-04-03 RX ADMIN — PROPOFOL 30 MG: 10 INJECTION, EMULSION INTRAVENOUS at 10:28

## 2023-04-03 RX ADMIN — FENTANYL CITRATE 50 MCG: 50 INJECTION, SOLUTION INTRAMUSCULAR; INTRAVENOUS at 10:22

## 2023-04-03 NOTE — PROGRESS NOTES
Dr. Roberts at bedside again assessing pt and BP.  Orders received and hydralazine given as ordered.  Will monitor.

## 2023-04-03 NOTE — PROGRESS NOTES
Pt noted to be hypertensive intra-op, continues to be so post op.  Denies pain.  Negative hx for hypertension.  Dr. Roberts consulted, at bedside evaluating pt.  No new orders.  Will continue monitoring.

## 2023-04-03 NOTE — PROGRESS NOTES
Patient offered pregnancy test prior to procedure. Patient denies possibility of pregnancy. Patient declines pregnancy test at this time.

## 2023-04-03 NOTE — ANESTHESIA POSTPROCEDURE EVALUATION
Patient: Donna Larose    Procedure: Procedure(s):  POLYPECTOMY, NASAL CAVITY, ENDOSCOPIC WITH BEVERLEY BULLOSA RESECTION TURBINOPLASTY, ENDOSCOPIC  TURBINOPLASTY, ENDOSCOPIC       Anesthesia Type:  General    Note:  Disposition: Outpatient   Postop Pain Control: Uneventful            Sign Out: Well controlled pain   PONV: No   Neuro/Psych: Uneventful            Sign Out: Acceptable/Baseline neuro status   Airway/Respiratory: Uneventful            Sign Out: Acceptable/Baseline resp. status   CV/Hemodynamics: Uneventful            Sign Out: Acceptable CV status; No obvious hypovolemia; No obvious fluid overload   Other NRE: NONE   DID A NON-ROUTINE EVENT OCCUR? No           Last vitals:  Vitals Value Taken Time   /115 04/03/23 1217   Temp 97.5  F (36.4  C) 04/03/23 1134   Pulse 58 04/03/23 1217   Resp 16 04/03/23 1215   SpO2 97 % 04/03/23 1217   Vitals shown include unvalidated device data.    Electronically Signed By: Morgan Roberts MD  April 3, 2023  2:39 PM

## 2023-04-03 NOTE — ANESTHESIA CARE TRANSFER NOTE
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    Anesthesia Type:   General     Note:    Oropharynx: oral airway in place and spontaneously breathing  Level of Consciousness: drowsy  Oxygen Supplementation: face mask  Level of Supplemental Oxygen (L/min / FiO2): 10  Independent Airway: airway patency satisfactory and stable  Dentition: dentition unchanged  Vital Signs Stable: post-procedure vital signs reviewed and stable  Report to RN Given: handoff report given  Patient transferred to: PACU    Handoff Report: Identifed the Patient, Identified the Reponsible Provider, Reviewed the pertinent medical history, Discussed the surgical course, Reviewed Intra-OP anesthesia mangement and issues during anesthesia, Set expectations for post-procedure period and Allowed opportunity for questions and acknowledgement of understanding      Vitals:  Vitals Value Taken Time   /97 04/03/23 1135   Temp 97.5  F (36.4  C) 04/03/23 1134   Pulse 68 04/03/23 1141   Resp 20 04/03/23 1134   SpO2 100 % 04/03/23 1141   Vitals shown include unvalidated device data.    Electronically Signed By: BEN Wolf CRNA  April 3, 2023  11:44 AM

## 2023-04-03 NOTE — ANESTHESIA PROCEDURE NOTES
Airway       Patient location during procedure: OR       Procedure Start/Stop Times: 4/3/2023 10:10 AM  Staff -        Anesthesiologist:  Morgan Roberts MD       CRNA: Michel Park APRN CRNA       Performed By: CRNA  Consent for Airway        Urgency: elective  Indications and Patient Condition       Indications for airway management: clari-procedural       Induction type:intravenous       Mask difficulty assessment: 2 - vent by mask + OA or adjuvant +/- NMBA    Final Airway Details       Final airway type: endotracheal airway       Successful airway: Oral and ABDULKADIR  Endotracheal Airway Details        ETT size (mm): 7.0       Cuffed: yes       Cuff volume (mL): 8       Successful intubation technique: direct laryngoscopy       DL Blade Type: Menjivar 2       Grade View of Cords: 1       Adjucts: stylet       Position: Center       Measured from: lips (at bend)       Bite block used: None    Post intubation assessment        Placement verified by: capnometry, equal breath sounds and chest rise        Number of attempts at approach: 1       Number of other approaches attempted: 0       Secured with: silk tape       Ease of procedure: easy       Dentition: Intact and Unchanged       Dental guard used and removed. Dental Guard Type: Proguard Red.    Medication(s) Administered   Medication Administration Time: 4/3/2023 10:10 AM

## 2023-04-03 NOTE — INTERVAL H&P NOTE
"I have reviewed the surgical (or preoperative) H&P that is linked to this encounter, and examined the patient. There are no significant changes    Clinical Conditions Present on Arrival:  Clinically Significant Risk Factors Present on Admission                    # Overweight: Estimated body mass index is 29.95 kg/m  as calculated from the following:    Height as of this encounter: 1.727 m (5' 8\").    Weight as of this encounter: 89.4 kg (197 lb).       "

## 2023-04-03 NOTE — OP NOTE
Otolaryngology Full Operative Report    Date of Operation:  4/3/22    Pre-operative Diagnosis:    1. Bilateral ostiomeatal complex obstruction, left maxillary sinus opacification/chronic sinusitis, bilateral vishal bullosa  2. Bilateral inferior turbinate hypertrophy    Post-operative Diagnosis:  Same    Procedure(s):    1. Bilateral functional endoscopic sinus surgery including maxillary antrostomies, bilateral vishal bullosa resection, bilateral ethmoid bullectomy  2. Bilateral submucosal partial reduction of the inferior turbinates    Surgeon: Amaya Grant MD  Assistant(s):  None  Anesthesia:  GET    Indications for Procedure:  Donna is a 33yo F with the above diagnoses. The risks and the benefits of the procedure were discussed with the patient. A consent form was then signed and placed into the chart.    Procedure in Detail:  The patient was brought into the operating room and placed on the table in a supine position. Anesthesia intubated without any difficulties. A time out was performed with all pertinent personnel in the room. The bed was then rotated 90 degrees. Anterior rhinoscopy was performed and afrin-soaked nasal pledgets were placed. The patient was then prepped and draped in the usual fashion.     The nasal pledgets were removed from the nose and a 0 degree rigid nasal endoscope was used to inspect both nasal passages. The left side was normal in appearance. The right side was normal in appearance. 3cc of 1% lidocaine with 1:100,000 epinephrine was injected into the anterior attachment of the middle turbinates bilaterally.    Attention was turned to the left side first. A freer elevator was used to medialize the middle turbinate, and a [place] was used to incise the uncinate process and remove it.  A ball-ended seeker was used to palpate for the natural maxillary sinus os and enlarge it. A microdebrider was used to finish performing the maxillary antrostomy. The contents of the sinus were noted  to be thick, retained secretions. They were suctioned and the sinus was then thoroughly irrigated with normal saline. The microdebrider was then used to enter the ethmoid bulla and also remove the vishal bullosa. Afrin-soaked pledgets were replaced into the nose and attention was turned to the right side. The maxillary antrostomy, ethmoid bullectomy, and vishal bullosa resection were performed in an identical fashion. A mini-Propel stent was placed into the operative field only on the left side. Hemostasis was achieved with surgicel powder. Attention was turned toward the turbinates.    The inferior turbinates were each injected with 1.5cc of saline each and Afrin soaked pledgets were placed in the nose for decongestion. After a period of a few minutes, the pledgets were removed and attention was then turned toward performing the PRITS. A needle-tipped coblator was inserted along the inferior edge of the inferior turbinate and used to ablate the inferior turbinate soft tissue, submucosally.  A Agrawal elevator was used to outfracture the turbinate to achieve maximum nasal airway diameter.  A subnasal dressing was placed. The final OR counts were correct at the end of the case.       Findings:  Bilateral vishal bullosa causing ostiomeatal complex obstruction, chronic retained secretions in the maxillary sinus on the left    Specimen(s):  none    EBL:  25cc    Complications:  none    Disposition: The patient was extubated in the operating room and was transferred to the PACU in stable condition.     Amaya Grant MD  [facility]  [phone] (o)       99

## 2023-04-03 NOTE — DISCHARGE INSTRUCTIONS
If you have any questions or concerns regarding your procedure, please contact Dr. Grant, her office number is [phone].     You have received 975 mg of Acetaminophen (Tylenol) at 9:05am. Please do not take an additional dose of Tylenol until after 3:05pm.    Do not exceed 4,000 mg of acetaminophen during a 24 hour period and keep in mind that acetaminophen can also be found in many over-the-counter cold medications as well as narcotics that may be given for pain.

## 2023-04-03 NOTE — ANESTHESIA PREPROCEDURE EVALUATION
Anesthesia Pre-Procedure Evaluation    Patient: Donna Larose   MRN: [id number] : 1990        Procedure : Procedure(s):  POLYPECTOMY, NASAL CAVITY, ENDOSCOPIC WITH BEVERLEY BULLOSA RESECTION TURBINOPLASTY, ENDOSCOPIC  TURBINOPLASTY, ENDOSCOPIC          Past Medical History:   Diagnosis Date     Asthma      Migraine with aura      PTSD (post-traumatic stress disorder) 2021     Seasonal allergies      Sleep apnea       History reviewed. No pertinent surgical history.   Allergies   Allergen Reactions     Morphine Anaphylaxis, Hives and Unknown     Mother states pt has never had but everyone in family that gets morphine they have problems with breathing  MOM and grandma both had anaphylxsis reaction so the patient would like to avoid.        Morphine Unknown      Social History     Tobacco Use     Smoking status: Never     Smokeless tobacco: Never   Vaping Use     Vaping status: Not on file   Substance Use Topics     Alcohol use: No      Wt Readings from Last 1 Encounters:   23 89.4 kg (197 lb)        Anesthesia Evaluation   Pt has had prior anesthetic.     No history of anesthetic complications       ROS/MED HX  ENT/Pulmonary: Comment: Chronic sinusitis and turbinate hypertrophy - neg pulmonary ROS   (+) Intermittent, asthma Treatment: Inhaler daily and Nebulizer prn,      Neurologic:  - neg neurologic ROS   (+) migraines,     Cardiovascular:  - neg cardiovascular ROS     METS/Exercise Tolerance: >4 METS    Hematologic:  - neg hematologic  ROS     Musculoskeletal:  - neg musculoskeletal ROS     GI/Hepatic:  - neg GI/hepatic ROS     Renal/Genitourinary:  - neg Renal ROS     Endo:  - neg endo ROS     Psychiatric/Substance Use:  - neg psychiatric ROS   (+) psychiatric history (PTSD) depression Recreational drug usage: Cannabis.    Infectious Disease:  - neg infectious disease ROS     Malignancy:  - neg malignancy ROS     Other:  - neg other ROS          Physical Exam    Airway        Mallampati: II    TM distance: > 3 FB   Neck ROM: full   Mouth opening: > 3 cm    Respiratory Devices and Support         Dental         B=Bridge, C=Chipped, L=Loose, M=Missing    Cardiovascular   cardiovascular exam normal          Pulmonary   pulmonary exam normal                OUTSIDE LABS:  CBC:   Lab Results   Component Value Date    HGB 12.1 03/29/2023     BMP: No results found for: NA, POTASSIUM, CHLORIDE, CO2, BUN, CR, GLC  COAGS: No results found for: PTT, INR, FIBR  POC:   Lab Results   Component Value Date    HCG Negative 12/16/2020     HEPATIC: No results found for: ALBUMIN, PROTTOTAL, ALT, AST, GGT, ALKPHOS, BILITOTAL, BILIDIRECT, SHAGUFTA  OTHER:   Lab Results   Component Value Date    A1C 5.2 12/16/2020       Anesthesia Plan    ASA Status:  2   NPO Status:  NPO Appropriate    Anesthesia Type: General.     - Airway: ETT   Induction: Propofol, Intravenous.   Maintenance: TIVA.        Consents    Anesthesia Plan(s) and associated risks, benefits, and realistic alternatives discussed. Questions answered and patient/representative(s) expressed understanding.    - Discussed:     - Discussed with:  Patient         Postoperative Care    Pain management: Multi-modal analgesia.   PONV prophylaxis: Ondansetron (or other 5HT-3), Dexamethasone or Solumedrol     Comments:    Other Comments: Reviewed anesthetic options and risks, including risk of dental trauma. Patient agrees to proceed.     Recent Results (from the past 120 hour(s))  -Hemoglobin:   Collection Time: 03/29/23 11:40 AM       Result                                            Value                         Ref Range                       Hemoglobin                                        12.1                          11.7 - 15.7 g/dL                     Morgan Roberts MD

## 2023-04-16 ENCOUNTER — HEALTH MAINTENANCE LETTER (OUTPATIENT)
Age: 33
End: 2023-04-16

## 2023-04-18 ENCOUNTER — OFFICE VISIT (OUTPATIENT)
Dept: OTOLARYNGOLOGY | Facility: CLINIC | Age: 33
End: 2023-04-18
Payer: COMMERCIAL

## 2023-04-18 DIAGNOSIS — J34.3 NASAL TURBINATE HYPERTROPHY: ICD-10-CM

## 2023-04-18 DIAGNOSIS — J34.89 CONCHA BULLOSA: ICD-10-CM

## 2023-04-18 DIAGNOSIS — J34.89 OSTIOMEATAL COMPLEX OBSTRUCTION OF PARANASAL SINUS: Primary | ICD-10-CM

## 2023-04-18 PROCEDURE — 31231 NASAL ENDOSCOPY DX: CPT | Performed by: OTOLARYNGOLOGY

## 2023-04-18 RX ORDER — CLOBETASOL PROPIONATE 0.5 MG/ML
SOLUTION TOPICAL
COMMUNITY
Start: 2022-01-09

## 2023-04-18 NOTE — LETTER
4/18/2023         RE: Donna Larose  [address]  [place] [facility] [address]        Dear Colleague,    Thank you for referring your patient, Donna Larose, to the [facility]. Please see a copy of my visit note below.    HPI: This patient is a 33yo F who presents for a post-op visit s/p mini-FESS, vishal bullosa takedown, and SMR PRITs. Doing well overall. Reports improved nasal breathing, sense of smell, and denies problems with bleeding. She has not been doing any saline irrigations as per discharge instructions.    Past medical history, past surgical history, social history, medications, and allergies have been reviewed with the patient and are documented above.    Review of Systems: an ENT specific review of systems is normal    PHYSICAL EXAMINATION:  GEN: no acute distress, normocephalic  NOSE: nares patent, septum non-obstucting  NASAL ENDOSCOPY: minor crusting associated with the inferior turbinates. OMCs are bilaterally patent with a mini-Propel stent present on the left (only placed on the left).   NECK: soft and supple. No lymphadenopathy or masses. Airway is midline.  PULM: breathing comfortably on room air, normal chest expansion with respiration    MEDICAL DECISION-MAKING: doing well s/p FESS.  Instructed on the nasal saline irrigation. RTC in 1 month.          Again, thank you for allowing me to participate in the care of your patient.        Sincerely,        Amaya Grant MD

## 2023-04-18 NOTE — PROGRESS NOTES
HPI: This patient is a 31yo F who presents for a post-op visit s/p mini-FESS, vishal bullosa takedown, and SMR PRITs. Doing well overall. Reports improved nasal breathing, sense of smell, and denies problems with bleeding. She has not been doing any saline irrigations as per discharge instructions.    Past medical history, past surgical history, social history, medications, and allergies have been reviewed with the patient and are documented above.    Review of Systems: an ENT specific review of systems is normal    PHYSICAL EXAMINATION:  GEN: no acute distress, normocephalic  NOSE: nares patent, septum non-obstucting  NASAL ENDOSCOPY: minor crusting associated with the inferior turbinates. OMCs are bilaterally patent with a mini-Propel stent present on the left (only placed on the left).   NECK: soft and supple. No lymphadenopathy or masses. Airway is midline.  PULM: breathing comfortably on room air, normal chest expansion with respiration    MEDICAL DECISION-MAKING: doing well s/p FESS.  Instructed on the nasal saline irrigation. RTC in 1 month.

## 2023-05-18 ASSESSMENT — PATIENT HEALTH QUESTIONNAIRE - PHQ9
10. IF YOU CHECKED OFF ANY PROBLEMS, HOW DIFFICULT HAVE THESE PROBLEMS MADE IT FOR YOU TO DO YOUR WORK, TAKE CARE OF THINGS AT HOME, OR GET ALONG WITH OTHER PEOPLE: NOT DIFFICULT AT ALL
SUM OF ALL RESPONSES TO PHQ QUESTIONS 1-9: 2
SUM OF ALL RESPONSES TO PHQ QUESTIONS 1-9: 2

## 2023-05-19 ENCOUNTER — VIRTUAL VISIT (OUTPATIENT)
Dept: PSYCHOLOGY | Facility: CLINIC | Age: 33
End: 2023-05-19
Payer: COMMERCIAL

## 2023-05-19 DIAGNOSIS — F43.10 PTSD (POST-TRAUMATIC STRESS DISORDER): Primary | ICD-10-CM

## 2023-05-19 PROCEDURE — 90834 PSYTX W PT 45 MINUTES: CPT | Mod: VID

## 2023-05-23 ENCOUNTER — OFFICE VISIT (OUTPATIENT)
Dept: OTOLARYNGOLOGY | Facility: CLINIC | Age: 33
End: 2023-05-23
Payer: COMMERCIAL

## 2023-05-23 DIAGNOSIS — J34.3 NASAL TURBINATE HYPERTROPHY: ICD-10-CM

## 2023-05-23 DIAGNOSIS — J34.89 OSTIOMEATAL COMPLEX OBSTRUCTION OF PARANASAL SINUS: Primary | ICD-10-CM

## 2023-05-23 DIAGNOSIS — J34.89 CONCHA BULLOSA: ICD-10-CM

## 2023-05-23 PROCEDURE — 31231 NASAL ENDOSCOPY DX: CPT | Performed by: OTOLARYNGOLOGY

## 2023-05-23 NOTE — PROGRESS NOTES
[facility]                                     Progress Note    Patient Name: Donna Larose  Date: 23           Service Type: Individual      Session Start Time: 1:30pm  Session End Time: 2:20pm     Session Length: 50 minutes    Session #: 2    Attendees: Client attended alone    Service Modality:  Video Visit:      Provider verified identity through the following two step process.  Patient provided:  Patient  and Patient address    Telemedicine Visit: The patient's condition can be safely assessed and treated via synchronous audio and visual telemedicine encounter.      Reason for Telemedicine Visit: Patient has requested telehealth visit    Originating Site (Patient Location): Patient's home    Distant Site (Provider Location): Provider Remote Setting- Home Office    Consent:  The patient/guardian has verbally consented to: the potential risks and benefits of telemedicine (video visit) versus in person care; bill my insurance or make self-payment for services provided; and responsibility for payment of non-covered services.     Patient would like the video invitation sent by:  My Chart    Mode of Communication:  Video Conference via [employer]well    Distant Location (Provider):  Off-site    As the provider I attest to compliance with applicable laws and regulations related to telemedicine.    DATA  Interactive Complexity: No  Crisis: No        Progress Since Last Session (Related to Symptoms / Goals / Homework):   Symptoms: Improving reduction in anxiety and depressive symptoms    Homework: Did not complete      Episode of Care Goals: No improvement - CONTEMPLATION (Considering change and yet undecided); Intervened by assessing the negative and positive thinking (ambivalence) about behavior change     Current / Ongoing Stressors and Concerns:   Pt returned for appointment after 3 month gap in services. She reported that after cancelling her last appointment she struggled to reschedule.  "Pt and writer were unable to complete DA due to time constraints and need to check in about changed in the past 3 months. Pt endorsed a reduction in sadness and anxiety. She attributed this change to moving out of her home that was infested with rats and bugs. She reported that she and her children have moved to [place]. She and her children continue to make a 1.5 hour commute to the [place] for work and school every work day. She reported that this is exhausting but \"better than living in that other house\". She shared plans to eventually purchase a home in the [facility]. She reported she is still dating the same man but that they do not see each other often because of the distance. Pt and writer discussed the ways that pt's PTSD symptoms impact her daily life.      Treatment Objective(s) Addressed in This Session:   identify 3 fears / thoughts that contribute to feeling anxious  Idenitfy trauma triggers       Intervention:   CBT: check the facts  DBT: grounding skills    Assessments completed prior to visit:  The following assessments were completed by patient for this visit:  PHQ9:       12/10/2019     6:00 PM 10/5/2020     8:36 AM 3/9/2022     1:11 PM 11/15/2022     3:53 PM 2/16/2023     2:49 PM 3/29/2023    11:45 AM 5/18/2023    12:48 PM   PHQ-9 SCORE   PHQ-9 Total Score MyChart     13 (Moderate depression)  2 (Minimal depression)   PHQ-9 Total Score 13 5 7 7 13 0 2     GAD7:       10/5/2020     8:36 AM 11/15/2022     3:53 PM 2/16/2023     3:06 PM 3/29/2023    11:45 AM   MICHAEL-7 SCORE   Total Score   10 (moderate anxiety)    Total Score 8 10 10 1     PROMIS 10-Global Health (only subscores and total score):       2/17/2023    10:25 AM 5/18/2023     1:05 PM   PROMIS-10 Scores Only   Global Mental Health Score 7 13   Global Physical Health Score 11 15   PROMIS TOTAL - SUBSCORES 18 28     [place] Suicide Severity Rating Scale (Lifetime/Recent)      2/17/2023    12:59 PM   [place] Suicide Severity Rating " (Lifetime/Recent)   1. Wish to be Dead (Lifetime) N   2. Non-Specific Active Suicidal Thoughts (Lifetime) N   Actual Attempt (Lifetime) N   Has subject engaged in non-suicidal self-injurious behavior? (Lifetime) N   Calculated C-SSRS Risk Score (Lifetime/Recent) No Risk Indicated         ASSESSMENT: Current Emotional / Mental Status (status of significant symptoms):   Risk status (Self / Other harm or suicidal ideation)   Patient denies current fears or concerns for personal safety.   Patient denies current or recent suicidal ideation or behaviors.   Patient denies current or recent homicidal ideation or behaviors.   Patient denies current or recent self injurious behavior or ideation.   Patient denies other safety concerns.   Patient reports there has been no change in risk factors since their last session.     Patient reports there has been no change in protective factors since their last session.     Recommended that patient call 911 or go to the local ED should there be a change in any of these risk factors.     Appearance:   Appropriate    Eye Contact:   Fair    Psychomotor Behavior: Normal  Restless    Attitude:   Cooperative  Friendly   Orientation:   All   Speech    Rate / Production: Talkative Normal     Volume:  Normal    Mood:    Normal   Affect:    Appropriate    Thought Content:  Clear    Thought Form:  Coherent  Logical    Insight:    Fair      Medication Review:   No current psychiatric medications prescribed     Medication Compliance:   NA     Changes in Health Issues:   None reported     Chemical Use Review:   Substance Use: decrease in cannabis .  Patient reports frequency of use 1 blunt per day- reduced from 6-7 blunts per day 3 months ago.  Stage of Change: Action        Tobacco Use: No current tobacco use.      Diagnosis:  Post Traumatic Stress Disorder   Collateral Reports Completed:   Not Applicable    PLAN: (Patient Tasks / Therapist Tasks / Other)  Pt and writer will complete DA next session  and begin treatment planning.  Pt will practice using grounding skills when experiencing triggers.  Pt and writer will continue to meet on a monthly basis.        LAURYN Driver                                                       This note has been reviewed and I agree with the plan of care. This note is co-signed by CHRISSY Neil, [facility] HospitalSW, Supervisor, on: May 23, 2023    Answers for HPI/ROS submitted by the patient on 5/18/2023  If you checked off any problems, how difficult have these problems made it for you to do your work, take care of things at home, or get along with other people?: Not difficult at all  PHQ9 TOTAL SCORE: 2

## 2023-05-23 NOTE — PROGRESS NOTES
HPI: This patient is a 31yo F who presents for a second post-op visit s/p mini-FESS, vishal bullosa takedown, and SMR PRITs. Doing well overall. Continues to report improved nasal breathing, sense of smell, and denies problems with bleeding. No more crusting.     Past medical history, past surgical history, social history, medications, and allergies have been reviewed with the patient and are documented above.     Review of Systems: an ENT specific review of systems is normal     PHYSICAL EXAMINATION:  GEN: no acute distress, normocephalic  NOSE: nares patent, septum non-obstucting  NASAL ENDOSCOPY: minor crusting associated with the inferior turbinates. OMCs are bilaterally patent. Left mini-Propel stent is gone (only placed on the left).   NECK: soft and supple. No lymphadenopathy or masses. Airway is midline.  PULM: breathing comfortably on room air, normal chest expansion with respiration     MEDICAL DECISION-MAKING: doing well s/p FESS.  Continue saline spray. RTC PRN.

## 2023-05-23 NOTE — LETTER
5/23/2023         RE: Donna Larose  [address]  [facility] [place] [facility] [address]        Dear Colleague,    Thank you for referring your patient, Donna Larose, to the [facility]. Please see a copy of my visit note below.    HPI: This patient is a 31yo F who presents for a second post-op visit s/p mini-FESS, vishal bullosa takedown, and SMR PRITs. Doing well overall. Continues to report improved nasal breathing, sense of smell, and denies problems with bleeding. No more crusting.     Past medical history, past surgical history, social history, medications, and allergies have been reviewed with the patient and are documented above.     Review of Systems: an ENT specific review of systems is normal     PHYSICAL EXAMINATION:  GEN: no acute distress, normocephalic  NOSE: nares patent, septum non-obstucting  NASAL ENDOSCOPY: minor crusting associated with the inferior turbinates. OMCs are bilaterally patent. Left mini-Propel stent is gone (only placed on the left).   NECK: soft and supple. No lymphadenopathy or masses. Airway is midline.  PULM: breathing comfortably on room air, normal chest expansion with respiration     MEDICAL DECISION-MAKING: doing well s/p FESS.  Continue saline spray. RTC PRN.      Again, thank you for allowing me to participate in the care of your patient.        Sincerely,        Amaya Grant MD

## 2023-06-22 ENCOUNTER — VIRTUAL VISIT (OUTPATIENT)
Dept: PHARMACY | Facility: CLINIC | Age: 33
End: 2023-06-22
Payer: MEDICAID

## 2023-06-22 DIAGNOSIS — F43.10 PTSD (POST-TRAUMATIC STRESS DISORDER): ICD-10-CM

## 2023-06-22 DIAGNOSIS — J32.0 CHRONIC MAXILLARY SINUSITIS: ICD-10-CM

## 2023-06-22 DIAGNOSIS — F33.41 RECURRENT MAJOR DEPRESSIVE DISORDER, IN PARTIAL REMISSION (H): ICD-10-CM

## 2023-06-22 DIAGNOSIS — J45.909 ASTHMA IN ADULT, UNSPECIFIED ASTHMA SEVERITY, UNSPECIFIED WHETHER COMPLICATED, UNSPECIFIED WHETHER PERSISTENT: Primary | ICD-10-CM

## 2023-06-22 DIAGNOSIS — R52 GENERALIZED PAIN: ICD-10-CM

## 2023-06-22 DIAGNOSIS — L21.9 SEBORRHEIC DERMATITIS: ICD-10-CM

## 2023-06-22 PROCEDURE — 99607 MTMS BY PHARM ADDL 15 MIN: CPT | Performed by: PHARMACIST

## 2023-06-22 PROCEDURE — 99605 MTMS BY PHARM NP 15 MIN: CPT | Performed by: PHARMACIST

## 2023-06-22 RX ORDER — FLUTICASONE FUROATE AND VILANTEROL 200; 25 UG/1; UG/1
1 POWDER RESPIRATORY (INHALATION) DAILY
Qty: 1 EACH | Refills: 5 | Status: SHIPPED | OUTPATIENT
Start: 2023-06-22 | End: 2024-06-24

## 2023-06-22 RX ORDER — ALBUTEROL SULFATE 90 UG/1
1-2 AEROSOL, METERED RESPIRATORY (INHALATION) EVERY 4 HOURS PRN
Qty: 18 G | Refills: 2 | Status: SHIPPED | OUTPATIENT
Start: 2023-06-22 | End: 2024-06-24

## 2023-06-22 RX ORDER — LORATADINE 10 MG/1
10 TABLET ORAL DAILY
COMMUNITY

## 2023-06-22 NOTE — Clinical Note
Dr. Grant - response requested please. Patient is having some sinusitis symptoms still, although improved from historical. Can/should Patient restart their historical loratadine 10 mg daily? Please advise.  Dr. Bond - I saw Donna for MTM visit. -We made a few medication changes to her inhalers to improve asthma med adherence and control. -I wanted to also let you know that she's not taking her escitalopram anymore, some confusion on this. She wasn't sure why it wasn't on her med list at this time, but note she's been off for a few months. Please see note for details.  Neisha - see note as FYI, specifically escitalopram details.  Thanks for collaborating on this patient's care. Destinee Stanley, GabeD Medication Therapy Management (MTM) Pharmacist

## 2023-06-22 NOTE — Clinical Note
Dr. Grant - response requested please (not urgent, but sending high priority because response requested).  Can another covering ENT provider advise, please?  Patient is having some sinusitis symptoms still, although improved from historical. Can/should Patient restart their historical loratadine 10 mg daily? Please advise.   Thanks for collaborating on this patient's care.  Destinee Stanley, PharmD  Medication Therapy Management (MTM) Pharmacist

## 2023-06-22 NOTE — PATIENT INSTRUCTIONS
"Recommendations from today's MTM visit:                                                    MTM (medication therapy management) is a service provided by a clinical pharmacist designed to help you get the most of out of your medicines.      Asthma plan:    1.  We will try to change your Dulera inhaler (which needs to be dosed as 2 puffs twice daily) to Breo Ellipta inhaler (which is dosed 1 puff once daily), to ease your medication burden.    If insurance covers the Breo Ellipta inhaler, stop your Dulera and start Breo Ellipta; inhale 1 puff by mouth once daily.  Rinse mouth out after use.    If you do continue Dulera (in the case that Breo is not covered by insurance), you should start rinsing your mouth out after using this one as well.    -- follow-up with Medication Therapy Management (MTM) pharmacist or primary care provider about 2 months after the inhaler change, sooner if needed    2.  We discussed the importance of taking your controller inhalers (Dulera or Breo, Spiriva) every day to prevent future asthma flares.  It is best to take the Spiriva at the same time of day each day.    3.  I will send a new prescription for an albuterol inhaler to the pharmacy.  Please ensure that your albuterol nebulizer solution and inhaler are not  so you have proper medication therapy if you do have an asthma flare.    4.  Please complete the asthma control test sent by [facility].    Follow-up: MTM will follow-up by [facility] regarding loratadine; recommend full medication review in 1 year, sooner if needed.    It was great speaking with you today.  I value your experience and would be very thankful for your time in providing feedback in our clinic survey. In the next few days, you may receive an email or text message from [facility] with a link to a survey related to your  clinical pharmacist.\"     To schedule another MTM appointment, please call the clinic directly or you may call the MTM scheduling line at [phone] " or toll-free at [phone].     My Clinical Pharmacist's contact information:                                                      Please feel free to contact me with any questions or concerns you have.      Destinee Stanley, PharmD  Medication Therapy Management (MTM) Pharmacist

## 2023-06-22 NOTE — PROGRESS NOTES
Medication Therapy Management (MTM) Encounter    ASSESSMENT:                            Medication Adherence/Access: See below for considerations     Asthma:  With last ACT score of 18/25, patient's asthma is not at ACT goal score of at least 20/25.  Recommend repeat ACT-we will send by [facility].  Recommend changing from Dulera to Breo Ellipta to ease inhalation burden and improve adherence to med regimen, likely improving asthma control.  Recommend rinsing mouth after using steroid inhaler, discussed oral thrush risk.  Reviewed importance of taking controller inhaler daily and dosing at the same time each day (Spiriva).  Education provided about the difference between controller inhaler and as needed albuterol use.  Discussed the importance of ensuring albuterol neb and MDI are not .  Future: Recommend asthma action plan after confirming controller inhaler regimen.    Sinusitis: Symptoms improved from prior historical, but may still benefit from antihistamine usage.  We will check with ENT to see if patient should/can restart loratadine.    Pain: Stable    Dermatitis: Stable     Depression/PTSD: There is some discrepancy per chart review and per patient report whether she is to be taking escitalopram at this time.  Since she is not interested in starting medication therapy at this time, I will notify her primary care provider and I have asked the patient to discuss with her PCP and therapist at upcoming appointments.    PLAN:                            Asthma plan:    1.  We will try to change your Dulera inhaler (which needs to be dosed as 2 puffs twice daily) to Breo Ellipta inhaler (which is dosed 1 puff once daily), to ease your medication burden.    If insurance covers the Breo Ellipta inhaler, stop your Dulera and start Breo Ellipta; inhale 1 puff by mouth once daily.  Rinse mouth out after use.    If you do continue Dulera (in the case that Breo is not covered by insurance), you should start rinsing  "your mouth out after using this one as well.    -- follow-up with Medication Therapy Management (MTM) pharmacist or primary care provider about 2 months after the inhaler change, sooner if needed.    2.  We discussed the importance of taking your controller inhalers (Dulera or Breo, Spiriva) every day to prevent future asthma flares.  It is best to take the Spiriva at the same time of day each day.    3.  I will send a new prescription for an albuterol inhaler to the pharmacy.  Please ensure that your albuterol nebulizer solution and inhaler are not  so you have proper medication therapy if you do have an asthma flare.    4.  Please complete the asthma control test sent by [facility].    MTM to:   -Check with ENT regarding loratadine and follow-up with patient by Jane. update: sent multiple chart routes and no response, will instruct Patient to contact ENT team directly.  -Notify primary care provider that patient is not taking her escitalopram chart routed.    Follow-up: MTM will follow-up by Jane regarding loratadine; recommend full medication review in 1 year, sooner if needed.    Addended on 2023  No response yet from ENT, re-sent message to Dr. Grant and requested a response from covering provider ENT team since it appears Dr. Grant has / or is leaving the organization.  KB    SUBJECTIVE/OBJECTIVE:                          Donna Larose is a 32 year old female called for an initial visit. She was referred to me from [employer].      Reason for visit: [facility] referral.  Patient has no medication questions or concerns    Allergies/ADRs: Reviewed in chart  Past Medical History: Reviewed in chart  Tobacco: She reports that she has never smoked. She has never used smokeless tobacco. Smoking marijuana about three times weekly.  Alcohol: Less than 1 beverage / month  Caffeine: coffee \"every once in a while\"    Medication Adherence/Access: no issues with affordability, " sometimes forgets her loratadine, as the visit continued she noted that sometimes she forgets to take her inhalers as well.  Takes medication out of the bottle.    Asthma:  Current symptoms: no shortness of breath, cough or wheezing right now, but about 2-3 days per week does have some symptoms.  Current asthma medications:   Albuterol MDI and neb solution using as needed - last use a few months ago.  Her albuterol inhaler is   Dulera 200-5 mc puffs twice daily - does not rinse mouth out, using twice daily about 3/7 days per week.  Often forgets to use twice daily  Tiotropium (Spiriva respimat) 2.5 mcg/actuation respimat inhaler: inhale 2 puffs by mouth once daily - time of day varies, tries to remember in the morning.  AAP on file: unknown, not created today since may change inhaler meds.      2022     4:37 PM 11/15/2022     3:53 PM 3/29/2023    11:45 AM   ACT Total Scores   ACT TOTAL SCORE (Goal Greater than or Equal to 20) 19 17 18   In the past 12 months, how many times did you visit the emergency room for your asthma without being admitted to the hospital? 0 0 0   In the past 12 months, how many times were you hospitalized overnight because of your asthma? 0 0 0     Sinusitis:  Follows with ENT, LOV 2023- no comment about loratadine at this appointment  Runny nose, sneezing, watery eyes, cough sometimes. Symptoms are all year round.  Patient had ENT surgery on 4/3/2023.  Current medications include   Loratadine (Claritin) 10 m tab daily - Patient reports that this was previously prescribed by Dr. Bond, she is unsure why its not on her medication list at this time.  Doesn't remember the last time she took loratadine (maybe before her surgery in April).   Wondering about restarting this medication because she does still have some of her allergy symptoms, but notes they are not as severe as they were before her surgery.  Medication History: flonase    Pain:  Current Meds:  Acetaminophen  "500 mg: using over-the-counter tabs - using less than once/month for headaches and finds effective, no concerns.    Dermatitis:  Clobetasol 0.05% external topical solution: apply topically to the scalp every night at bedtime as needed - using about once/month for the \"eczema on her scalp \", finds effective.    Depression/PTSD:  Therapist: sees Neisha De Santiago with [facility], has a follow-up appointment scheduled.  Current medications include: Patient does not currently take any medications, reports that she was using one previously but is uncertain why it was stopped, didn't realize it's not on her med list anymore.  Per chart review I found escitalopram was on the medication list but then was discontinued 3/2023 at the time of patient's preop appointment.  From visit note: Patient has a long history of Depression of moderate severity requiring medication for control with recent symptoms being gradually improving.Current symptoms of depression include none    Patient thinks her last escitalopram use was a couple months ago, March/April.  States that she told her therapist that she doesn't think the medication was helping. The marijuana helps more, thinks maybe this is why she stopped the medicine, is uncertain.  Would like to remain off medication at this time and plans to discuss this further with her therapist and primary care provider in the future..  Pt reports that depression symptoms are stable, doesn't want to start different med at this time.    Medication History: escitalopram - see above.  PHQ9:       2/16/2023     2:49 PM 3/29/2023    11:45 AM 5/18/2023    12:48 PM   PHQ-9 SCORE   PHQ-9 Total Score MyChart 13 (Moderate depression)  2 (Minimal depression)   PHQ-9 Total Score 13 0 2     GAD7:       11/15/2022     3:53 PM 2/16/2023     3:06 PM 3/29/2023    11:45 AM   MICHAEL-7 SCORE   Total Score  10 (moderate anxiety)    Total Score 10 10 1     Today's Vitals: There were no vitals taken for this " visit.  ----------------    I spent 25 minutes with this patient today. All changes were made via collaborative practice agreement with Cammie Bond MD. A copy of the visit note was provided to the patient's provider(s).    A summary of these recommendations was sent via [employer].    Gabe TrotterD  Medication Therapy Management (MTM) Pharmacist  [place] [place] [facility] (Tu/Th/Fr)  Clinic Phone: [phone]  Pager: [phone]    Telemedicine Visit Details  Type of service:  Telephone visit  Start Time:  9:05 am  End Time: 9:30 AM     Medication Therapy Recommendations  Asthma in adult    Current Medication: albuterol (PROAIR HFA/PROVENTIL HFA/VENTOLIN HFA) 108 (90 Base) MCG/ACT inhaler   Rationale: Medication product not available - Adherence - Adherence   Recommendation: Start Medication   Status: Accepted per CPA   Note: Reorder med and education provided to have unexpired medication available          Current Medication: mometasone-formoterol (DULERA) 200-5 MCG/ACT inhaler   Rationale: Patient forgets to take - Adherence - Adherence   Recommendation: Change Medication   Status: Accepted per CPA   Note: Changed to once daily dosing inhaler         Chronic maxillary sinusitis    Current Medication: loratadine (CLARITIN) 10 MG tablet   Rationale: Untreated condition - Needs additional medication therapy - Indication   Recommendation: Start Medication   Status: Contact Provider - Awaiting Response   Note: We will check with ENT

## 2023-07-11 ENCOUNTER — TELEPHONE (OUTPATIENT)
Dept: FAMILY MEDICINE | Facility: CLINIC | Age: 33
End: 2023-07-11
Payer: MEDICAID

## 2023-07-11 NOTE — TELEPHONE ENCOUNTER
[facility] phone call message- general phone call:    Reason for call:     Patient Handicap sticker is about to  soon, Patient would like Dr. Bond to re-write for a new sticker. Patient would also like a return phone call for updates.    Return call needed: Yes    OK to leave a message on voice mail? Yes    Primary language: English      needed? No    Call taken on 2023 at 11:29 AM by Zak Elder

## 2023-07-13 NOTE — TELEPHONE ENCOUNTER
Patient made aware of PCP schedule and is agreeable to seeing a different provider to have handicap sticker form completed appt scheduled    Winnie Shea RN on 7/13/2023 at 1:03 PM

## 2023-07-25 ENCOUNTER — OFFICE VISIT (OUTPATIENT)
Dept: FAMILY MEDICINE | Facility: CLINIC | Age: 33
End: 2023-07-25
Payer: MEDICAID

## 2023-07-25 VITALS
WEIGHT: 203 LBS | SYSTOLIC BLOOD PRESSURE: 132 MMHG | OXYGEN SATURATION: 96 % | RESPIRATION RATE: 16 BRPM | DIASTOLIC BLOOD PRESSURE: 83 MMHG | BODY MASS INDEX: 30.87 KG/M2 | TEMPERATURE: 98.4 F | HEART RATE: 60 BPM

## 2023-07-25 DIAGNOSIS — Z02.89 ENCOUNTER FOR COMPLETION OF FORM WITH PATIENT: Primary | ICD-10-CM

## 2023-07-25 PROCEDURE — 99212 OFFICE O/P EST SF 10 MIN: CPT | Mod: GC

## 2023-07-25 NOTE — PATIENT INSTRUCTIONS
Thank you for trusting us with your care.     Here's a summary of the visit today:   Disability parking permit application completed. Fill out the required information and return to  to have form mailed to the DMV for you   Follow up for asthma every 3 months          Thank you.     Dr. Condon

## 2023-07-25 NOTE — PROGRESS NOTES
Assessment & Plan     Encounter for completion of form with patient  Presents for completion of forms for disability parking permit.  Has been having this filled annually with PCP.  Her permit is due to  next month.  Patient has form with her.  Will fill out form today and give back to patient to include past permit ID that is requested on the form.  See instructions below.      Patient Instructions   Thank you for trusting us with your care.     Here's a summary of the visit today:   Disability parking permit application completed. Fill out the required information and return to  to have form mailed to the DMV for you   Follow up for asthma every 3 months          Thank you.     Dr. Condon      Return in about 3 months (around 10/25/2023), or if symptoms worsen or fail to improve.    Anand Condon DO, PGY-3  [facility]    Today I precepted with Dr. Justin MD, who agrees with the assessment and plan.      Chun Thompson is a 33 year old, presenting for the following health issues:  Other ([employer] handi cap sticker. For allergies and breathing issues)        3/29/2023    10:26 AM   Additional Questions   Roomed by [email or web address]   Accompanied by self     HPI         Type of letter, form or note:  handicap    Who is the form from?: disability parking permit (if other please explain)    Where did the form come from: Patient or family brought in         Where the form was placed: Given to MA/RN to copy, given to patient to complete and return to  for mailing    Patient would like form mailed out to DMV from our office.         Review of Systems   Constitutional, HEENT, cardiovascular, pulmonary, gi and gu systems are negative, except as otherwise noted.      Objective    /83 (BP Location: Left arm, Patient Position: Sitting, Cuff Size: Adult Regular)   Pulse 60   Temp 98.4  F (36.9  C) (Oral)   Resp 16   Wt 92.1 kg (203 lb)   SpO2 96%   BMI 30.87 kg/m    Body  mass index is 30.87 kg/m .  Physical Exam   GENERAL: healthy, alert and no distress  NECK: no adenopathy, no asymmetry, masses, or scars and thyroid normal to palpation  RESP: lungs clear to auscultation - no rales, rhonchi or wheezes  CV: regular rate and rhythm, normal S1 S2, no S3 or S4, no murmur, click or rub, no peripheral edema and peripheral pulses strong  ABDOMEN: soft, nontender, no hepatosplenomegaly, no masses and bowel sounds normal  MS: no gross musculoskeletal defects noted, no edema

## 2023-08-01 NOTE — PROGRESS NOTES
Preceptor attestation:  Vital signs reviewed: /83 (BP Location: Left arm, Patient Position: Sitting, Cuff Size: Adult Regular)   Pulse 60   Temp 98.4  F (36.9  C) (Oral)   Resp 16   Wt 92.1 kg (203 lb)   SpO2 96%   BMI 30.87 kg/m      Patient seen, evaluated, and discussed with the resident.  I verified the content of the note, which accurately reflects my assessment of the patient and the plan of care.    Supervising physician: Latonya Anderson MD  [facility]

## 2023-08-21 ENCOUNTER — TELEPHONE (OUTPATIENT)
Dept: FAMILY MEDICINE | Facility: CLINIC | Age: 33
End: 2023-08-21

## 2023-08-21 NOTE — TELEPHONE ENCOUNTER
[facility] phone call message- patient requesting a refill:    Full Medication Name: ALBUTEROL SOULITION     Dose: ?    Pharmacy confirmed as   [id number] - [place], [place] - [address]  [address]  [place] [address]  Phone: [phone] Fax: [phone]    : Yes    Additional Comments: REFILL     OK to leave a message on voice mail? Yes    Primary language: English      needed? No    Call taken on August 21, 2023 at 2:24 PM by Adnrew Moncada

## 2023-08-22 NOTE — TELEPHONE ENCOUNTER
Neb solution has been sent in response to refill message.      Cammie Bond MD    Routed to [facility] for FYI.

## 2023-12-27 ENCOUNTER — TELEPHONE (OUTPATIENT)
Dept: FAMILY MEDICINE | Facility: CLINIC | Age: 33
End: 2023-12-27
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
## 2023-12-28 NOTE — TELEPHONE ENCOUNTER
Spoke with ALISA Saba about this patient.  Will plan to call patient on Friday at 2:30 to discuss what happened and next steps.    Cammie Bond MD    Routed to ALISA Saba

## 2023-12-29 NOTE — TELEPHONE ENCOUNTER
Left message for patient.  Went directly to voicemail.  Left a message to call clinic.     Cammie Bond MD
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
## 2024-01-02 PROBLEM — J96.02 ACUTE RESPIRATORY FAILURE WITH HYPOXIA AND HYPERCARBIA (H): Status: RESOLVED | Noted: 2021-02-14 | Resolved: 2024-01-02

## 2024-01-02 PROBLEM — F33.9 RECURRENT MAJOR DEPRESSIVE DISORDER (H): Status: ACTIVE | Noted: 2020-04-09

## 2024-01-02 PROBLEM — Z87.59 HISTORY OF GESTATIONAL HYPERTENSION: Status: ACTIVE | Noted: 2017-04-24

## 2024-01-02 PROBLEM — N70.93 RIGHT TUBO-OVARIAN ABSCESS: Status: RESOLVED | Noted: 2020-04-09 | Resolved: 2024-01-02

## 2024-01-02 PROBLEM — R00.0 SINUS TACHYCARDIA: Status: RESOLVED | Noted: 2021-02-14 | Resolved: 2024-01-02

## 2024-01-02 PROBLEM — J96.01 ACUTE RESPIRATORY FAILURE WITH HYPOXIA AND HYPERCARBIA (H): Status: RESOLVED | Noted: 2021-02-14 | Resolved: 2024-01-02

## 2024-01-02 PROBLEM — E87.29 RESPIRATORY ACIDOSIS: Status: RESOLVED | Noted: 2021-02-14 | Resolved: 2024-01-02

## 2024-01-02 PROBLEM — E87.6 HYPOKALEMIA: Status: RESOLVED | Noted: 2019-03-05 | Resolved: 2024-01-02

## 2024-01-19 ENCOUNTER — TELEPHONE (OUTPATIENT)
Dept: FAMILY MEDICINE | Facility: CLINIC | Age: 34
End: 2024-01-19

## 2024-01-19 NOTE — TELEPHONE ENCOUNTER
[facility] phone call message- general phone call:    Reason for call: Pt needs a doctor's statement/letter for [employer].  It needs to state that she needs to keep her electricity on due to her using her neb machine.    Return call needed: Yes    OK to leave a message on voice mail? Yes    Primary language: English      needed? No    Call taken on January 19, 2024 at 2:26 PM by Gama Preciado

## 2024-01-19 NOTE — TELEPHONE ENCOUNTER
I am happy to provide this information, but usually the electric company has a specific form that they like us to use.      Looking online, I found the following form:    [email or web address]    This is an online form - not a print out.      I can fill out some of the information, but I do not know her [employer] Account number.  I can indicate the importance of nebulizer use for her asthma, as well as the asthma of her children.      Again, I can also write a letter, but usually it goes more smoothly if we complete the form.     Stacy, can you call patient and see what she is looking for in particular, and if she has asked the electric company about what documentation they need?    Thank you!    Dr. Bond

## 2024-03-27 ENCOUNTER — TELEPHONE (OUTPATIENT)
Dept: FAMILY MEDICINE | Facility: CLINIC | Age: 34
End: 2024-03-27
Payer: COMMERCIAL

## 2024-03-27 NOTE — TELEPHONE ENCOUNTER
Patient Quality Outreach    Patient is due for the following:   Physical Preventive Adult Physical    Next Steps:   Schedule a Adult Preventative    Type of outreach:    Called, phone number is not in services    Next Steps:  Reach out within 90 days via Phone.    Max number of attempts reached: No. Will try again in 90 days if patient still on fail list.    Questions for provider review:    None           Azar Espinoza  Chart routed to Care Team.

## 2024-06-23 ENCOUNTER — HEALTH MAINTENANCE LETTER (OUTPATIENT)
Age: 34
End: 2024-06-23

## 2024-06-24 ENCOUNTER — TELEPHONE (OUTPATIENT)
Dept: FAMILY MEDICINE | Facility: CLINIC | Age: 34
End: 2024-06-24

## 2024-06-24 ENCOUNTER — TELEPHONE (OUTPATIENT)
Dept: CARE COORDINATION | Facility: CLINIC | Age: 34
End: 2024-06-24
Payer: COMMERCIAL

## 2024-06-24 DIAGNOSIS — J45.40 MODERATE PERSISTENT ASTHMA, UNSPECIFIED WHETHER COMPLICATED: ICD-10-CM

## 2024-06-24 DIAGNOSIS — J45.909 ASTHMA IN ADULT, UNSPECIFIED ASTHMA SEVERITY, UNSPECIFIED WHETHER COMPLICATED, UNSPECIFIED WHETHER PERSISTENT: ICD-10-CM

## 2024-06-24 RX ORDER — FLUTICASONE FUROATE AND VILANTEROL 200; 25 UG/1; UG/1
1 POWDER RESPIRATORY (INHALATION) DAILY
Qty: 1 EACH | Refills: 5 | Status: SHIPPED | OUTPATIENT
Start: 2024-06-24

## 2024-06-24 RX ORDER — ALBUTEROL SULFATE 90 UG/1
1-2 AEROSOL, METERED RESPIRATORY (INHALATION) EVERY 4 HOURS PRN
Qty: 18 G | Refills: 2 | Status: SHIPPED | OUTPATIENT
Start: 2024-06-24

## 2024-06-24 RX ORDER — ALBUTEROL SULFATE 0.83 MG/ML
SOLUTION RESPIRATORY (INHALATION)
Qty: 75 ML | Refills: 1 | Status: SHIPPED | OUTPATIENT
Start: 2024-06-24
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
## 2024-06-24 NOTE — TELEPHONE ENCOUNTER
Care Coordination: 6/24/2024    JESSICA Duke presented this patient for care coordination. She called the clinic today requesting resources due to her losing housing due to an eviction. I have called the patient twice this morning but each time, the phone rings , then a busy signal and the phone hangs up.     My suggestions:    [facility] Team  [phone]    [facility]  [facility] (shelter Intake) Mon-Thursday at 10:00 am  [address] [place] [address]  [phone]    Note:   [facility] offers free emergency shelter and transitional housing for homeless families with children. Families are given their own private apartment with bedroom space, kitchen space, bathroom, basic furniture items like couch/table/chairs/shelving units, phone, and television with cable. There are 2 sizes of apartments. The smaller apartments can accommodate 3-5 people and the larger apartments can accommodate 5-12 people.       [facility] ([facility]),[address] [address]  [phone]      [facility]  Transitional Housing [facility]  Time-limited housing and support services for individuals and/or families.  [address] [place], [place], [address]  [phone]    [facility] Stability [facility]  [address], [place], [address]  [phone]    [facility]  [address], [place], [address]  [phone]    [facility]  [address] [place] [address], [address]  [phone]    Note:   Families are referred through the [facility] in [place].    Family Crisis Housing  [facility]  [address] [place], [place] [address] [phone]    [facility]  [address], [place], [address]  [phone]    [address]  [address]  Phone: [phone]    [facility]   [address], [place] [address]  Phone: [phone]    [address], [place] [address]  Phone: [phone]        Amari Mota Sr.   Care Coordination  [address] [address]  [email or web address]   Office: [phone] Direct: [phone]  [facility]

## 2024-06-24 NOTE — TELEPHONE ENCOUNTER
Prescriptions sent in for Albuterol inhaler, Nebs, and Breo Ellipta to [facility] in [place].     Thank you Amari for reaching out to her about resources!    DR. Bond    Routed to Amari Askew Shannon.

## 2024-06-27 ENCOUNTER — MEDICAL CORRESPONDENCE (OUTPATIENT)
Dept: HEALTH INFORMATION MANAGEMENT | Facility: CLINIC | Age: 34
End: 2024-06-27
Payer: COMMERCIAL

## 2024-06-27 ENCOUNTER — TELEPHONE (OUTPATIENT)
Dept: CARE COORDINATION | Facility: CLINIC | Age: 34
End: 2024-06-27
Payer: COMMERCIAL

## 2024-06-27 NOTE — TELEPHONE ENCOUNTER
Care Coordination: 6/27/2024    The patient called the clinic this morning requesting emergency housing due to her lease not being renewed and ending on July 15 2024. I provided the patient, via e-mail: [email or web address] with the following shelter information in [place] and referred her to: [facility]. For their housing services program.       [facility]  [address] [place] [address]  Phone: [phone]     [facility]   [address], [place] [address]  Phone: [phone]     [facility] for [address] [address], [place] [address]  Phone: [phone]      Amari Mota Sr.   Care Coordination  [address] [address]  [email or web address]   Office: [phone] Direct: [phone]  [facility]

## 2024-07-08 ENCOUNTER — TELEPHONE (OUTPATIENT)
Dept: CARE COORDINATION | Facility: CLINIC | Age: 34
End: 2024-07-08
Payer: COMMERCIAL
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
## 2024-07-15 ENCOUNTER — TELEPHONE (OUTPATIENT)
Dept: FAMILY MEDICINE | Facility: CLINIC | Age: 34
End: 2024-07-15

## 2024-07-15 NOTE — TELEPHONE ENCOUNTER
She has a handicapped parking certificate because her asthma makes her so short of breath that she is unable to walk 200 feet.     If her asthma continues to be that severe, she needs an asthma visit to follow up on symptoms in order to repeat the form.  Mostly because we want to do a better job with her asthma!    If her asthma is better (as I would hope), she would no longer need the handicap parking certificate.  Unless she has a new health problem that is affecting her mobility.  In that case, we still need a visit to discuss.    So, yes, patient needs a visit to complete the form, do an ACT, refill inhalers, and ensure we are appropriately treating her asthma.     Cammie Bond MD    Routed to ALISA Kumar

## 2024-07-15 NOTE — TELEPHONE ENCOUNTER
General Call    Contacts       Contact Date/Time Type Contact Phone/Fax    07/15/2024 11:04 AM CDT Phone (Incoming) Donna Larose (Self) [phone] (M)          Reason for Call:  Pt is calling because her handicapped sticker is about to run out.  She is wondering if the doctor can do that automatically or if she needs to come in for it.    What are your questions or concerns:  see above    Date of last appointment with provider: 07/25/2023    Could we send this information to you in [employer] or would you prefer to receive a phone call?:   Patient would prefer a phone call   Okay to leave a detailed message?: Yes at Cell number on file:    Telephone Information:   Mobile [phone]            Does patient or caller know when to expect a call? Yes, Nurses will return call within 2-3 business hours.       Gama Preciado on 7/15/2024 at 11:05 AM

## 2024-07-22 ENCOUNTER — OFFICE VISIT (OUTPATIENT)
Dept: FAMILY MEDICINE | Facility: CLINIC | Age: 34
End: 2024-07-22
Payer: COMMERCIAL

## 2024-07-22 VITALS
RESPIRATION RATE: 16 BRPM | OXYGEN SATURATION: 100 % | HEART RATE: 92 BPM | DIASTOLIC BLOOD PRESSURE: 89 MMHG | SYSTOLIC BLOOD PRESSURE: 133 MMHG | TEMPERATURE: 98.8 F

## 2024-07-22 DIAGNOSIS — J45.909 PERSISTENT ASTHMA WITHOUT COMPLICATION, UNSPECIFIED ASTHMA SEVERITY: Primary | ICD-10-CM

## 2024-07-22 PROCEDURE — 99213 OFFICE O/P EST LOW 20 MIN: CPT | Performed by: FAMILY MEDICINE

## 2024-07-22 ASSESSMENT — ASTHMA QUESTIONNAIRES: ACT_TOTALSCORE: 16
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
## 2024-09-17 ENCOUNTER — TELEPHONE (OUTPATIENT)
Dept: FAMILY MEDICINE | Facility: CLINIC | Age: 34
End: 2024-09-17
Payer: COMMERCIAL

## 2024-09-17 NOTE — TELEPHONE ENCOUNTER
Patient Quality Outreach    Patient is due for the following:   Physical Preventive Adult Physical    Next Steps:   Schedule a office visit for cpe and pap on 10/09/24    Type of outreach:    Phone, spoke to patient/parent. Talked to patient, she already have appt scheduled in 10/09/24    Next Steps:  Reach out within 90 days via Phone.    Max number of attempts reached: Yes. Will try again in 90 days if patient still on fail list.    Questions for provider review:    None           Azar Espinoza  Chart routed to Care Team.

## 2024-11-23 DIAGNOSIS — Z00.00 PREVENTATIVE HEALTH CARE: Primary | ICD-10-CM

## 2024-11-23 DIAGNOSIS — Z87.59 HISTORY OF GESTATIONAL HYPERTENSION: ICD-10-CM

## 2024-11-23 DIAGNOSIS — J45.41 MODERATE PERSISTENT ASTHMA WITH ACUTE EXACERBATION: ICD-10-CM

## 2024-12-02 ENCOUNTER — MYC REFILL (OUTPATIENT)
Dept: FAMILY MEDICINE | Facility: CLINIC | Age: 34
End: 2024-12-02
Payer: COMMERCIAL

## 2024-12-02 DIAGNOSIS — A74.9 CHLAMYDIA INFECTION: ICD-10-CM

## 2024-12-02 DIAGNOSIS — R31.9 URINARY TRACT INFECTION WITH HEMATURIA, SITE UNSPECIFIED: ICD-10-CM

## 2024-12-02 DIAGNOSIS — J45.40 MODERATE PERSISTENT ASTHMA, UNSPECIFIED WHETHER COMPLICATED: ICD-10-CM

## 2024-12-02 DIAGNOSIS — N39.0 URINARY TRACT INFECTION WITH HEMATURIA, SITE UNSPECIFIED: ICD-10-CM

## 2024-12-02 DIAGNOSIS — L30.8 OTHER ECZEMA: ICD-10-CM

## 2024-12-02 DIAGNOSIS — J45.909 ASTHMA IN ADULT, UNSPECIFIED ASTHMA SEVERITY, UNSPECIFIED WHETHER COMPLICATED, UNSPECIFIED WHETHER PERSISTENT: ICD-10-CM

## 2024-12-02 NOTE — TELEPHONE ENCOUNTER
albuterol (PROAIR HFA/PROVENTIL HFA/VENTOLIN HFA) 108 (90 Base) MCG/ACT inhaler          Possible duplicate: Hover to review recent actions on this medication    Sig: Inhale 1-2 puffs into the lungs every 4 hours as needed for shortness of breath or wheezing.    Disp: 18 g    Refills: 2    Start: 12/2/2024    Class: E-Prescribe    Non-formulary For: Asthma in adult, unspecified asthma severity, unspecified whether complicated, unspecified whether persistent    To pharmacy: Pharmacy may dispense brand covered by insurance (Proair, or proventil or ventolin or generic albuterol inhaler)    Last ordered: 5 months ago (6/24/2024) by Cammie Bond MD    Patient comment: This should have been refilled. Doctor said she was going to get it refilled and sent to my pharmacy    Short-Acting Beta Agonist Inhalers Protocol  Lxbmxh9612/02/2024 12:44 PM   Protocol Details Asthma control assessment score within normal limits in last 6 months    Patient is age 12 or older    Medication is active on med list    Recent (6 mo) or future (30 days) visit within the authorizing provider's specialty       albuterol (PROVENTIL) (2.5 MG/3ML) 0.083% neb solution          Possible duplicate: Hover to review recent actions on this medication    Sig: USE 1 VIAL VIA NEBULIZER EVERY 4 HOURS AS NEEDED FOR WHEEZING OR SHORTNESS OF BREATH    Disp: 75 mL    Refills: 1    Start: 12/2/2024    Class: E-Prescribe    Non-formulary For: Moderate persistent asthma, unspecified whether complicated    Last ordered: 5 days ago (11/27/2024) by Cammie Bond MD    Patient comment: This should have been refilled. Doctor said she was going to get it refilled and sent to my pharmacy    Short-Acting Beta Agonist Inhalers Protocol  Rqbyye5512/02/2024 12:44 PM   Protocol Details Asthma control assessment score within normal limits in last 6 months    Patient is age 12 or older    Medication is active on med list    Recent (6 mo) or future (30 days) visit within  the authorizing provider's specialty       clobetasol (TEMOVATE) 0.05 % external solution          Possible duplicate: Hover to review recent actions on this medication    Sig: Apply topically 2 times daily.    Disp: 50 mL    Refills: 2    Start: 12/2/2024    Class: E-Prescribe    Non-formulary For: Other eczema    Last ordered: 5 days ago (11/27/2024) by Cammie Bond MD    Patient comment: This should have been refilled. Doctor said she was going to get it refilled and sent to my pharmacy        loratadine (CLARITIN) 10 MG tablet          Possible duplicate: Hover to review recent actions on this medication    Sig: Take 1 tablet (10 mg) by mouth daily.    Disp: 90 tablet    Refills: 3    Start: 12/2/2024    Class: E-Prescribe    Non-formulary For: Asthma in adult, unspecified asthma severity, unspecified whether complicated, unspecified whether persistent    Last ordered: 5 days ago (11/27/2024) by Cammie Bond MD    Patient comment: This should have been refilled. Doctor said she was going to get it refilled and sent to my pharmacy    Antihistamines Protocol Qghhss7012/02/2024 12:44 PM   Protocol Details Patient is 3-64 years of age    Recent (12 mo) or future (30 days) visit within the authorizing provider's specialty    Medication is active on med list    Medication indicated for associated diagnosis       doxycycline hyclate (VIBRA-TABS) 100 MG tablet          Possible duplicate: Geraldover to review recent actions on this medication    Sig: Take 1 tablet (100 mg) by mouth 2 times daily.    Disp: 14 tablet    Refills: 0    Start: 12/2/2024    Class: E-Prescribe    Non-formulary For: Chlamydia infection    Last ordered: 3 days ago (11/29/2024) by Cammie Bond MD    Patient comment: I was just prescribed this medication but I lost it at a hotel. Hotel didn t keep anything there by time I called my things were gone. I haven t started taking the medication yet, is there anyway you can refill my  prescription?        nitroFURantoin macrocrystal-monohydrate (MACROBID) 100 MG capsule          Possible duplicate: Nan to review recent actions on this medication    Sig: Take 1 capsule (100 mg) by mouth 2 times daily.    Disp: 14 capsule    Refills: 0    Start: 12/2/2024    Class: E-Prescribe    Non-formulary For: Urinary tract infection with hematuria, site unspecified    Last ordered: 3 days ago (11/29/2024) by Cammie Bond MD    Patient comment: I was just prescribed this medication but I lost it at a hotel. Hotel didn t keep anything there by time I called my things were gone. I haven t started taking the medication yet, is there anyway you can refill my prescription?     ALISA Berrios, BSN

## 2024-12-03 RX ORDER — CLOBETASOL PROPIONATE 0.5 MG/ML
SOLUTION TOPICAL 2 TIMES DAILY
Qty: 50 ML | Refills: 2 | Status: SHIPPED | OUTPATIENT
Start: 2024-12-03

## 2024-12-03 RX ORDER — DOXYCYCLINE HYCLATE 100 MG
100 TABLET ORAL 2 TIMES DAILY
Qty: 14 TABLET | Refills: 0 | Status: SHIPPED | OUTPATIENT
Start: 2024-12-03

## 2024-12-03 RX ORDER — LORATADINE 10 MG/1
10 TABLET ORAL DAILY
Qty: 90 TABLET | Refills: 3 | Status: SHIPPED | OUTPATIENT
Start: 2024-12-03

## 2024-12-03 RX ORDER — ALBUTEROL SULFATE 90 UG/1
1-2 INHALANT RESPIRATORY (INHALATION) EVERY 4 HOURS PRN
Qty: 18 G | Refills: 2 | Status: SHIPPED | OUTPATIENT
Start: 2024-12-03

## 2024-12-03 RX ORDER — ALBUTEROL SULFATE 0.83 MG/ML
SOLUTION RESPIRATORY (INHALATION)
Qty: 75 ML | Refills: 1 | Status: SHIPPED | OUTPATIENT
Start: 2024-12-03

## 2024-12-03 RX ORDER — NITROFURANTOIN 25; 75 MG/1; MG/1
100 CAPSULE ORAL 2 TIMES DAILY
Qty: 14 CAPSULE | Refills: 0 | Status: SHIPPED | OUTPATIENT
Start: 2024-12-03

## 2024-12-05 PROBLEM — R87.619 ABNORMAL PAP SMEAR OF CERVIX: Status: ACTIVE | Noted: 2024-11-27

## 2024-12-05 PROBLEM — A74.9 CHLAMYDIA INFECTION: Status: ACTIVE | Noted: 2024-11-27

## 2025-01-31 ENCOUNTER — TELEPHONE (OUTPATIENT)
Dept: FAMILY MEDICINE | Facility: CLINIC | Age: 35
End: 2025-01-31

## 2025-01-31 NOTE — TELEPHONE ENCOUNTER
General Call      Reason for Call:  call back    What are your questions or concerns:  she needs her handy cap sign renewed    Date of last appointment with provider: 11/24/24    Could we send this information to you in [employer] or would you prefer to receive a phone call?:   Patient would prefer a phone call   Okay to leave a detailed message?: Yes at Home number on file [phone] (home)      Does this patient currently have active insurance coverage?  Yes, Pt has active insurance coverage.     Does patient or caller know when to expect a call? Yes, Nurses will return call within 2-3 business hours.    Andrew Moncada on 1/31/2025 at 10:50 AM       
Spoke with pt regarding handicap placard renewal. Instructed pt ot complete renewal application through the DMV and then fax the completed form to the clinic for Dr Bond to complete. Pt was last seen in November of 2024, so no visit is needed for this request.    ALISA Berrios, BSN    
[FreeTextEntry1] : The patient has PAF . He has not had recent episodes . the episodes are brief . He was seen by EP and it was discussed possible full A/C . It was decided that he rides a motorcycle and his episodes were brief . It was decided not to A/C at that time .

## 2025-02-19 ENCOUNTER — MYC REFILL (OUTPATIENT)
Dept: FAMILY MEDICINE | Facility: CLINIC | Age: 35
End: 2025-02-19
Payer: COMMERCIAL

## 2025-02-19 DIAGNOSIS — J45.40 MODERATE PERSISTENT ASTHMA, UNSPECIFIED WHETHER COMPLICATED: ICD-10-CM

## 2025-02-19 DIAGNOSIS — J45.909 ASTHMA IN ADULT, UNSPECIFIED ASTHMA SEVERITY, UNSPECIFIED WHETHER COMPLICATED, UNSPECIFIED WHETHER PERSISTENT: ICD-10-CM

## 2025-02-19 DIAGNOSIS — L30.8 OTHER ECZEMA: ICD-10-CM

## 2025-02-20 RX ORDER — LORATADINE 10 MG/1
10 TABLET ORAL DAILY
Qty: 90 TABLET | Refills: 3 | Status: SHIPPED | OUTPATIENT
Start: 2025-02-20

## 2025-02-20 RX ORDER — ALBUTEROL SULFATE 0.83 MG/ML
SOLUTION RESPIRATORY (INHALATION)
Qty: 75 ML | Refills: 1 | Status: SHIPPED | OUTPATIENT
Start: 2025-02-20

## 2025-02-20 RX ORDER — CLOBETASOL PROPIONATE 0.5 MG/ML
SOLUTION TOPICAL 2 TIMES DAILY
Qty: 50 ML | Refills: 2 | Status: SHIPPED | OUTPATIENT
Start: 2025-02-20

## 2025-02-20 NOTE — TELEPHONE ENCOUNTER
albuterol (PROVENTIL) (2.5 MG/3ML) 0.083% neb solution          Sig: USE 1 VIAL VIA NEBULIZER EVERY 4 HOURS AS NEEDED FOR WHEEZING OR SHORTNESS OF BREATH    Disp: 75 mL    Refills: 1    Start: 2/19/2025    Class: E-Prescribe    Non-formulary For: Moderate persistent asthma, unspecified whether complicated    Last ordered: 2 months ago (12/3/2024) by Cammie Bond MD    Patient comment: Need a lot in stock been using it every 4-6 hours.    Short-Acting Beta Agonist Inhalers Protocol  Bhytxk0302/19/2025 06:58 PM   Protocol Details Asthma control assessment score within normal limits in last 6 months    Patient is age 12 or older    Medication is active on med list and the sig matches. RN to manually verify dose and sig if red X/fail.    Recent (6 mo) or future (90 days) visit within the authorizing provider's specialty       clobetasol (TEMOVATE) 0.05 % external solution         Sig: Apply topically 2 times daily.    Disp: 50 mL    Refills: 2    Start: 2/19/2025    Class: E-Prescribe    Non-formulary For: Other eczema    Last ordered: 2 months ago (12/3/2024) by Cammie Bond MD    Patient comment: Need a couple bottles, eczema been dry a lot more than what I am used to.        loratadine (CLARITIN) 10 MG tablet         Sig: Take 1 tablet (10 mg) by mouth daily.    Disp: 90 tablet    Refills: 3    Start: 2/19/2025    Class: E-Prescribe    Non-formulary For: Asthma in adult, unspecified asthma severity, unspecified whether complicated, unspecified whether persistent    Last ordered: 2 months ago (12/3/2024) by Cammie Bond MD    Antihistamines Protocol Pxhwwe3502/19/2025 06:58 PM   Protocol Details Patient is 3-64 years of age    Medication is active on med list and the sig matches. RN to manually verify dose and sig if red X/fail.    Recent (12 month) or future (90 days) visit with authorizing provider s specialty    Medication indicated for associated diagnosis      To be filled at: [facility]  #[id number] - [place], [place] - [address] AT [facility] [address]       Prescription approved per [facility] Refill Protocol.      Tod Cast RN, MSN

## 2025-03-06 ENCOUNTER — TELEPHONE (OUTPATIENT)
Dept: GASTROENTEROLOGY | Facility: CLINIC | Age: 35
End: 2025-03-06
Payer: COMMERCIAL

## 2025-03-06 DIAGNOSIS — Z12.11 ENCOUNTER FOR SCREENING COLONOSCOPY: Primary | ICD-10-CM

## 2025-03-06 RX ORDER — BISACODYL 5 MG/1
TABLET, DELAYED RELEASE ORAL
Qty: 4 TABLET | Refills: 0 | Status: SHIPPED | OUTPATIENT
Start: 2025-03-06

## 2025-03-06 NOTE — TELEPHONE ENCOUNTER
Pre visit planning completed.      Procedure details:    Patient scheduled for Colonoscopy on 3/24/25.     Arrival time: 1430. Procedure time 1530    Facility location: [facility]; [facility] [place] [address]. Check in location: 1st [facility] [place], operated by [facility].     Sedation type: MAC    Pre op exam needed? Yes. Patient needs to complete an in person pre-operative exam within 30 days of procedure. Informed patient pre op is needed via [employer].    Indication for procedure: screening, family hx      Chart review:     Electronic implanted devices? No    Recent diagnosis of diverticulitis within the last 6 weeks? No      Medication review:    Diabetic? No    Anticoagulants? No    Weight loss medication/injectable? No GLP-1 medication per patient's medication list. Nursing to verify with pre-assessment call.    Other medication HOLDING recommendations:  Cannabis/Marijuana: Stop night before procedure.      Prep for procedure:     Bowel prep recommendation: Standard Miralax.   Due to: standard bowel prep    Procedure information and instructions sent via [employer]         Katie De Oliveira RN  Endoscopy Procedure Pre Assessment   [phone] option 3

## 2025-03-06 NOTE — TELEPHONE ENCOUNTER
Pre assessment completed for upcoming procedure.   (Please see previous telephone encounter notes for complete details)    Patient requested a work note for the day after the procedure. RN advised this would need to be discussed with Dr. Call. Message was sent as an FYI in case patient forgets to discuss day of procedure.    Procedure details:    Arrival time and facility location reviewed.    Pre op exam needed? Yes. Patient needs to complete an in person pre-operative exam within 30 days of procedure. Informed patient pre op is needed via pre assessment call.    Designated  policy reviewed. Instructed to have someone stay 24  hours post procedure.       Medication review:    Medications reviewed. Please see supporting documentation below. Holding recommendations discussed (if applicable).       Prep for procedure:     Patient wanted a prescription bowel prep rather than buy the items herself. Rx fo Golytely sent to preferred pharmacy and updated bowel prep instructions were sent to the patient.    Patient declined to review procedure prep instructions on the phone. Instructed patient to review the procedure prep instructions at least 7 days prior to procedure due to necessary dietary modifications. NPO instructions reviewed.    Patient was instructed to call if any questions or concerns arise.          Any additional information needed:  N/A      Patient verbalized understanding and had no questions or concerns at this time.      Katie De Oliveira RN  Endoscopy Procedure Pre Assessment   [phone] option 3

## 2025-03-17 ENCOUNTER — OFFICE VISIT (OUTPATIENT)
Dept: FAMILY MEDICINE | Facility: CLINIC | Age: 35
End: 2025-03-17
Payer: COMMERCIAL

## 2025-03-17 VITALS
BODY MASS INDEX: 23.14 KG/M2 | OXYGEN SATURATION: 100 % | TEMPERATURE: 98.7 F | HEIGHT: 66 IN | SYSTOLIC BLOOD PRESSURE: 133 MMHG | WEIGHT: 144 LBS | DIASTOLIC BLOOD PRESSURE: 74 MMHG | RESPIRATION RATE: 16 BRPM | HEART RATE: 61 BPM

## 2025-03-17 DIAGNOSIS — J45.40 MODERATE PERSISTENT ASTHMA, UNSPECIFIED WHETHER COMPLICATED: ICD-10-CM

## 2025-03-17 DIAGNOSIS — Z01.818 PREOP GENERAL PHYSICAL EXAM: Primary | ICD-10-CM

## 2025-03-17 ASSESSMENT — ASTHMA QUESTIONNAIRES
QUESTION_5 LAST FOUR WEEKS HOW WOULD YOU RATE YOUR ASTHMA CONTROL: SOMEWHAT CONTROLLED
ACT_TOTALSCORE: 16
QUESTION_3 LAST FOUR WEEKS HOW OFTEN DID YOUR ASTHMA SYMPTOMS (WHEEZING, COUGHING, SHORTNESS OF BREATH, CHEST TIGHTNESS OR PAIN) WAKE YOU UP AT NIGHT OR EARLIER THAN USUAL IN THE MORNING: ONCE OR TWICE
QUESTION_1 LAST FOUR WEEKS HOW MUCH OF THE TIME DID YOUR ASTHMA KEEP YOU FROM GETTING AS MUCH DONE AT WORK, SCHOOL OR AT HOME: MOST OF THE TIME
QUESTION_4 LAST FOUR WEEKS HOW OFTEN HAVE YOU USED YOUR RESCUE INHALER OR NEBULIZER MEDICATION (SUCH AS ALBUTEROL): TWO OR THREE TIMES PER WEEK
QUESTION_2 LAST FOUR WEEKS HOW OFTEN HAVE YOU HAD SHORTNESS OF BREATH: ONCE OR TWICE A WEEK
ACT_TOTALSCORE: 16

## 2025-03-17 ASSESSMENT — PATIENT HEALTH QUESTIONNAIRE - PHQ9
SUM OF ALL RESPONSES TO PHQ QUESTIONS 1-9: 6
10. IF YOU CHECKED OFF ANY PROBLEMS, HOW DIFFICULT HAVE THESE PROBLEMS MADE IT FOR YOU TO DO YOUR WORK, TAKE CARE OF THINGS AT HOME, OR GET ALONG WITH OTHER PEOPLE: NOT DIFFICULT AT ALL
SUM OF ALL RESPONSES TO PHQ QUESTIONS 1-9: 6

## 2025-03-17 NOTE — PROGRESS NOTES
Preceptor Attestation:    I discussed the patient with the resident and evaluated the patient in person. I have verified the content of the note, which accurately reflects my assessment of the patient and the plan of care.   Supervising Physician:  Jamarcus Quijano MD.
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
## 2025-03-17 NOTE — Clinical Note
Cc'ing Dr. Bond. I recommended follow-up and consideration of SMART therapy for asthma control. Uri Chang, DO

## 2025-03-17 NOTE — PATIENT INSTRUCTIONS
The night before the exam at 6 pm drink an 8-ounce glass every 15 minutes until the jug is half empty. If you arrive before 11 AM: Drink the other half of the Golytely jug at 11 PM night before procedure. If you arrive after 11 AM: Drink the other half of the Golytely jug at 6 AM day of procedure     Take 2 tablets at 3 pm the day before your procedure. If your procedure is before 11 am, take 2 additional tablets at 11 pm. If your procedure is after 11 am, take 2 additional tablets at 6 am. For additional instructions refer to your colonoscopy prep instructions.,     How to Take Your Medication Before Surgery  Preoperative Medication Instructions   Antiplatelet or Anticoagulation Medication Instructions   - We reviewed the medication list and the patient is not on an antiplatelet or anticoagulation medications.    Additional Medication Instructions  Take all scheduled medications on the day of surgery       Patient Education   Preparing for Your Surgery  For Adults  Getting started  In most cases, a nurse will call to review your health history and instructions. They will give you an arrival time based on your scheduled surgery time. Please be ready to share:  Your doctor's clinic name and phone number  Your medical, surgical, and anesthesia history  A list of allergies and sensitivities  A list of medicines, including herbal treatments and over-the-counter drugs  Whether the patient has a legal guardian (ask how to send us the papers in advance)  Note: You may not receive a call if you were seen at our PAC (Preoperative Assessment Center).  Please tell us if you're pregnant--or if there's any chance you might be pregnant. Some surgeries may injure a fetus (unborn baby), so they require a pregnancy test. Surgeries that are safe for a fetus don't always need a test, and you can choose whether to have one.   Preparing for surgery  Within 10 to 30 days of surgery: Have a pre-op exam (sometimes called an H&P, or History  and Physical). This can be done at a clinic or pre-operative center.  If you're having a , you may not need this exam. Talk to your care team.  At your pre-op exam, talk to your care team about all medicines you take. (This includes CBD oil and any drugs, such as THC, marijuana, and other forms of cannabis.) If you need to stop any medicine before surgery, ask when to start taking it again.  This is for your safety. Many medicines and drugs can make you bleed too much during surgery. Some change how well surgery (anesthesia) drugs work.  Call your insurance company to let them know you're having surgery. (If you don't have insurance, call [phone].)  Call your clinic if there's any change in your health. This includes a scrape or scratch near the surgery site, or any signs of a cold (sore throat, runny nose, cough, rash, fever).  Eating and drinking guidelines  For your safety: Unless your surgeon tells you otherwise, follow the guidelines below.  Eat and drink as normal until 8 hours before you arrive for surgery. After that, no food or milk. You can spit out gum when you arrive.  Drink clear liquids until 2 hours before you arrive. These are liquids you can see through, like water, Gatorade, and Propel Water. They also include plain black coffee and tea (no cream or milk).  No alcohol for 24 hours before you arrive. The night before surgery, stop any drinks that contain THC.  If your care team tells you to take medicine on the morning of surgery, it's okay to take it with a sip of water. No other medicines or drugs are allowed (including CBD oil)--follow your care team's instructions.  If you have questions the day of surgery, call your hospital or surgery center.   Preventing infection  Shower or bathe the night before and the morning of surgery. Follow the instructions your clinic gave you. (If no instructions, use regular soap.)  Don't shave or clip hair near your surgery site. We'll remove the  hair if needed.  Don't smoke or vape the morning of surgery. No chewing tobacco for 6 hours before you arrive. A nicotine patch is okay. You may spit out nicotine gum when you arrive.  For some surgeries, the surgeon will tell you to fully quit smoking and nicotine.  We will make every effort to keep you safe from infection. We will:  Clean our hands often with soap and water (or an alcohol-based hand rub).  Clean the skin at your surgery site with a special soap that kills germs.  Give you a special gown to keep you warm. (Cold raises the risk of infection.)  Wear hair covers, masks, gowns, and gloves during surgery.  Give antibiotic medicine, if prescribed. Not all surgeries need this medicine.  What to bring on the day of surgery  Photo ID and insurance card  Copy of your health care directive, if you have one  Glasses and hearing aids (bring cases)  You can't wear contacts during surgery  Inhaler and eye drops, if you use them (tell us about these when you arrive)  CPAP machine or breathing device, if you use them  A few personal items, if spending the night  If you have . . .  A pacemaker, ICD (cardiac defibrillator), or other implant: Bring the ID card.  An implanted stimulator: Bring the remote control.  A legal guardian: Bring a copy of the certified (court-stamped) guardianship papers.  Please remove any jewelry, including body piercings. Leave jewelry and other valuables at home.  If you're going home the day of surgery  You must have a responsible adult drive you home. They should stay with you overnight as well.  If you don't have someone to stay with you, and you aren't safe to go home alone, we may keep you overnight. Insurance often won't pay for this.  After surgery  If it's hard to control your pain or you need more pain medicine, please call your surgeon's office.  Questions?   If you have any questions for your care team, list them here:    ____________________________________________________________________________________________________________________________________________________________________________________________________________________________________________________________  For informational purposes only. Not to replace the advice of your health care provider. Copyright   2003, 2019 [facility]. All rights reserved. Clinically reviewed by Itz Donaldson MD. [employer]ART[employer] [id number] - REV 08/24.

## 2025-03-24 ENCOUNTER — HOSPITAL ENCOUNTER (OUTPATIENT)
Facility: CLINIC | Age: 35
Discharge: HOME | End: 2025-03-24
Attending: INTERNAL MEDICINE | Admitting: INTERNAL MEDICINE
Payer: COMMERCIAL

## 2025-03-24 ENCOUNTER — ANESTHESIA EVENT (OUTPATIENT)
Dept: GASTROENTEROLOGY | Facility: CLINIC | Age: 35
End: 2025-03-24
Payer: COMMERCIAL

## 2025-03-24 ENCOUNTER — PATIENT OUTREACH (OUTPATIENT)
Dept: GASTROENTEROLOGY | Facility: CLINIC | Age: 35
End: 2025-03-24

## 2025-03-24 ENCOUNTER — ANESTHESIA (OUTPATIENT)
Dept: GASTROENTEROLOGY | Facility: CLINIC | Age: 35
End: 2025-03-24
Payer: COMMERCIAL

## 2025-03-24 VITALS
WEIGHT: 144 LBS | DIASTOLIC BLOOD PRESSURE: 89 MMHG | BODY MASS INDEX: 23.14 KG/M2 | HEIGHT: 66 IN | OXYGEN SATURATION: 98 % | RESPIRATION RATE: 21 BRPM | SYSTOLIC BLOOD PRESSURE: 136 MMHG | HEART RATE: 63 BPM

## 2025-03-24 LAB — COLONOSCOPY: NORMAL

## 2025-03-24 PROCEDURE — 45378 DIAGNOSTIC COLONOSCOPY: CPT | Performed by: INTERNAL MEDICINE

## 2025-03-24 PROCEDURE — 999N000010 HC STATISTIC ANES STAT CODE-CRNA PER MINUTE: Performed by: INTERNAL MEDICINE

## 2025-03-24 PROCEDURE — 250N000011 HC RX IP 250 OP 636: Mod: JZ | Performed by: NURSE ANESTHETIST, CERTIFIED REGISTERED

## 2025-03-24 PROCEDURE — 250N000009 HC RX 250: Performed by: NURSE ANESTHETIST, CERTIFIED REGISTERED

## 2025-03-24 PROCEDURE — 370N000017 HC ANESTHESIA TECHNICAL FEE, PER MIN: Performed by: INTERNAL MEDICINE

## 2025-03-24 PROCEDURE — 258N000003 HC RX IP 258 OP 636: Performed by: NURSE ANESTHETIST, CERTIFIED REGISTERED

## 2025-03-24 PROCEDURE — G0105 COLORECTAL SCRN; HI RISK IND: HCPCS | Performed by: INTERNAL MEDICINE

## 2025-03-24 RX ORDER — PROPOFOL 10 MG/ML
INJECTION, EMULSION INTRAVENOUS PRN
Status: DISCONTINUED | OUTPATIENT
Start: 2025-03-24 | End: 2025-03-24

## 2025-03-24 RX ORDER — GLYCOPYRROLATE 0.2 MG/ML
INJECTION, SOLUTION INTRAMUSCULAR; INTRAVENOUS PRN
Status: DISCONTINUED | OUTPATIENT
Start: 2025-03-24 | End: 2025-03-24

## 2025-03-24 RX ORDER — PROPOFOL 10 MG/ML
INJECTION, EMULSION INTRAVENOUS CONTINUOUS PRN
Status: DISCONTINUED | OUTPATIENT
Start: 2025-03-24 | End: 2025-03-24

## 2025-03-24 RX ORDER — SODIUM CHLORIDE, SODIUM LACTATE, POTASSIUM CHLORIDE, CALCIUM CHLORIDE 600; 310; 30; 20 MG/100ML; MG/100ML; MG/100ML; MG/100ML
INJECTION, SOLUTION INTRAVENOUS CONTINUOUS PRN
Status: DISCONTINUED | OUTPATIENT
Start: 2025-03-24 | End: 2025-03-24

## 2025-03-24 RX ORDER — DEXMEDETOMIDINE HYDROCHLORIDE 4 UG/ML
INJECTION, SOLUTION INTRAVENOUS PRN
Status: DISCONTINUED | OUTPATIENT
Start: 2025-03-24 | End: 2025-03-24

## 2025-03-24 RX ORDER — LIDOCAINE HYDROCHLORIDE 20 MG/ML
INJECTION, SOLUTION INFILTRATION; PERINEURAL PRN
Status: DISCONTINUED | OUTPATIENT
Start: 2025-03-24 | End: 2025-03-24

## 2025-03-24 RX ADMIN — LIDOCAINE HYDROCHLORIDE 40 MG: 20 INJECTION, SOLUTION INFILTRATION; PERINEURAL at 15:03

## 2025-03-24 RX ADMIN — DEXMEDETOMIDINE HYDROCHLORIDE 4 MCG: 200 INJECTION INTRAVENOUS at 15:12

## 2025-03-24 RX ADMIN — DEXMEDETOMIDINE HYDROCHLORIDE 8 MCG: 200 INJECTION INTRAVENOUS at 15:04

## 2025-03-24 RX ADMIN — GLYCOPYRROLATE 0.2 MG: 0.2 INJECTION, SOLUTION INTRAMUSCULAR; INTRAVENOUS at 15:11

## 2025-03-24 RX ADMIN — PROPOFOL 50 MG: 10 INJECTION, EMULSION INTRAVENOUS at 15:05

## 2025-03-24 RX ADMIN — PROPOFOL 200 MCG/KG/MIN: 10 INJECTION, EMULSION INTRAVENOUS at 15:04

## 2025-03-24 RX ADMIN — SODIUM CHLORIDE, POTASSIUM CHLORIDE, SODIUM LACTATE AND CALCIUM CHLORIDE: 600; 310; 30; 20 INJECTION, SOLUTION INTRAVENOUS at 14:59

## 2025-03-24 ASSESSMENT — ACTIVITIES OF DAILY LIVING (ADL)
ADLS_ACUITY_SCORE: 42
ADLS_ACUITY_SCORE: 42

## 2025-03-24 NOTE — ANESTHESIA PREPROCEDURE EVALUATION
Anesthesia Pre-Procedure Evaluation    Patient: Donna Larose   MRN: [id number] : 1990        Procedure : Procedure(s):  Colonoscopy          Past Medical History:   Diagnosis Date    Acute respiratory failure with hypoxia and hypercarbia (H) 2021    Asthma     Hypokalemia 2019    Migraine with aura     PTSD (post-traumatic stress disorder) 2021    Respiratory acidosis 2021    Right tubo-ovarian abscess 2020    Seasonal allergies     Sleep apnea       Past Surgical History:   Procedure Laterality Date    ENDOSCOPIC POLYPECTOMY NASAL Bilateral 4/3/2023    Procedure: POLYPECTOMY, NASAL CAVITY, ENDOSCOPIC WITH BEVERLEY BULLOSA RESECTION TURBINOPLASTY, ENDOSCOPIC;  Surgeon: Amaya Grant MD;  Location: [facility]    TURBINATE RESECTION Bilateral 4/3/2023    Procedure: TURBINOPLASTY, ENDOSCOPIC;  Surgeon: Amaya Grant MD;  Location: [facility]      Allergies   Allergen Reactions    Morphine Anaphylaxis, Hives and Unknown     Mother states pt has never had but everyone in family that gets morphine they have problems with breathing  MOM and grandma both had anaphylxsis reaction so the patient would like to avoid.       Morphine Unknown      Social History     Tobacco Use    Smoking status: Never    Smokeless tobacco: Never   Substance Use Topics    Alcohol use: No      Wt Readings from Last 1 Encounters:   25 65.3 kg (144 lb)        Anesthesia Evaluation            ROS/MED HX  ENT/Pulmonary: Comment: Hx of nasal polyps, s/p resection    (+) sleep apnea,                    Mild Persistent, asthma                  Neurologic:     (+)      migraines,                          Cardiovascular:    (-) hypertension and dyslipidemia   METS/Exercise Tolerance: >4 METS    Hematologic:       Musculoskeletal:       GI/Hepatic:    (-) GERD   Renal/Genitourinary:    (-) renal disease   Endo:    (-) Type II DM and obesity   Psychiatric/Substance Use:   "   (+) psychiatric history other (comment) and depression (PTSD)       Infectious Disease:       Malignancy:       Other:            Physical Exam    Airway        Mallampati: II   TM distance: > 3 FB   Neck ROM: full   Mouth opening: > 3 cm    Respiratory Devices and Support         Dental       (+) Edentulous      Cardiovascular   cardiovascular exam normal          Pulmonary           (+) decreased breath sounds           OUTSIDE LABS:  CBC:   Lab Results   Component Value Date    WBC 8.7 11/27/2024    HGB 12.2 11/27/2024    HGB 12.1 03/29/2023    HCT 37.1 11/27/2024     11/27/2024     BMP:   Lab Results   Component Value Date     11/27/2024    POTASSIUM 4.0 11/27/2024    CHLORIDE 101 11/27/2024    CO2 23 11/27/2024    BUN 11.5 11/27/2024    CR 0.75 11/27/2024     (H) 11/27/2024     COAGS: No results found for: \"PTT\", \"INR\", \"FIBR\"  POC:   Lab Results   Component Value Date    HCG Negative 12/16/2020     HEPATIC: No results found for: \"ALBUMIN\", \"PROTTOTAL\", \"ALT\", \"AST\", \"GGT\", \"ALKPHOS\", \"BILITOTAL\", \"BILIDIRECT\", \"SHAGUFTA\"  OTHER:   Lab Results   Component Value Date    A1C 5.4 11/27/2024    PRICE 8.5 (L) 11/27/2024    TSH 0.63 11/27/2024       Anesthesia Plan    ASA Status:  2    NPO Status:  NPO Appropriate    Anesthesia Type: MAC.     - Reason for MAC: chronic cardiopulmonary disease              Consents    Anesthesia Plan(s) and associated risks, benefits, and realistic alternatives discussed. Questions answered and patient/representative(s) expressed understanding.     - Discussed: Risks, Benefits and Alternatives for BOTH SEDATION and the PROCEDURE were discussed     - Discussed with:  Patient            Postoperative Care       PONV prophylaxis: Background Propofol Infusion, Ondansetron (or other 5HT-3)     Comments:               Rosalio Silva MD    Clinically Significant Risk Factors Present on Admission                                 # Asthma: noted on problem list          "

## 2025-03-24 NOTE — ANESTHESIA POSTPROCEDURE EVALUATION
Patient: Donna Larose    Procedure: Procedure(s):  Colonoscopy       Anesthesia Type:  MAC    Note:  Disposition: Outpatient   Postop Pain Control: Uneventful            Sign Out: Well controlled pain   PONV: No   Neuro/Psych: Uneventful            Sign Out: Acceptable/Baseline neuro status   Airway/Respiratory: Uneventful            Sign Out: Acceptable/Baseline resp. status   CV/Hemodynamics: Uneventful            Sign Out: Acceptable CV status   Other NRE: NONE   DID A NON-ROUTINE EVENT OCCUR?            Last vitals:  Vitals Value Taken Time   BP 87/74 03/24/25 1520   Temp     Pulse 70 03/24/25 1524   Resp 15 03/24/25 1524   SpO2 100 % 03/24/25 1524   Vitals shown include unfiled device data.    Electronically Signed By: Rosalio Silva MD  March 24, 2025  3:25 PM

## 2025-03-24 NOTE — ANESTHESIA CARE TRANSFER NOTE
Patient: Donna Larose    Procedure: Procedure(s):  Colonoscopy       Diagnosis: [facility] [Z00.00]  Diagnosis Additional Information: No value filed.    Anesthesia Type:   MAC     Note:    Oropharynx: oropharynx clear of all foreign objects  Level of Consciousness: awake  Oxygen Supplementation: face mask  Level of Supplemental Oxygen (L/min / FiO2): 8  Independent Airway: airway patency satisfactory and stable  Dentition: dentition unchanged  Vital Signs Stable: post-procedure vital signs reviewed and stable  Report to RN Given: handoff report given  Patient transferred to: PACU    Handoff Report: Identifed the Patient, Identified the Reponsible Provider, Reviewed the pertinent medical history, Discussed the surgical course, Reviewed Intra-OP anesthesia mangement and issues during anesthesia, Set expectations for post-procedure period and Allowed opportunity for questions and acknowledgement of understanding      Vitals:  Vitals Value Taken Time   BP 87/74 03/24/25 1520   Temp     Pulse 65 03/24/25 1521   Resp 19 03/24/25 1521   SpO2 100 % 03/24/25 1521   Vitals shown include unfiled device data.    Electronically Signed By: BEN Ramey CRNA  March 24, 2025  3:22 PM

## 2025-03-24 NOTE — H&P
[facility]  Pre-Endoscopy History and Physical     Donna Larose MRN# [id number]   YOB: 1990 Age: 34 year old     Date of Procedure: 3/24/2025  Primary care provider: Cammie Bond  Type of Endoscopy: colonoscopy  Reason for Procedure: F/H of polyps  Type of Anesthesia Anticipated: MAC    HPI:    Donna is a 34 year old female who will be undergoing the above procedure.      A history and physical has been performed. The patient's medications and allergies have been reviewed. The risks and benefits of the procedure and the sedation options and risks were discussed with the patient.  All questions were answered and informed consent was obtained.      She denies a personal or family history of anesthesia complications or bleeding disorders.     Allergies   Allergen Reactions    Morphine Anaphylaxis, Hives and Unknown     Mother states pt has never had but everyone in family that gets morphine they have problems with breathing  MOM and grandma both had anaphylxsis reaction so the patient would like to avoid.       Morphine Unknown        Prior to Admission Medications   Prescriptions Last Dose Informant Patient Reported? Taking?   albuterol (PROAIR HFA/PROVENTIL HFA/VENTOLIN HFA) 108 (90 Base) MCG/ACT inhaler   No No   Sig: Inhale 1-2 puffs into the lungs every 4 hours as needed for shortness of breath or wheezing.   albuterol (PROVENTIL) (2.5 MG/3ML) 0.083% neb solution   No No   Sig: USE 1 VIAL VIA NEBULIZER EVERY 4 HOURS AS NEEDED FOR WHEEZING OR SHORTNESS OF BREATH   bisacodyl (DULCOLAX) 5 MG EC tablet   No No   Sig: Take 2 tablets at 3 pm the day before your procedure. If your procedure is before 11 am, take 2 additional tablets at 11 pm. If your procedure is after 11 am, take 2 additional tablets at 6 am. For additional instructions refer to your colonoscopy prep instructions.   clobetasol (TEMOVATE) 0.05 % external solution   No No   Sig: Apply topically 2 times  daily.   fluticasone-vilanterol (BREO ELLIPTA) 200-25 MCG/ACT inhaler   No No   Sig: Inhale 1 puff into the lungs daily. .  Rinse mouth out after use   loratadine (CLARITIN) 10 MG tablet   No No   Sig: Take 1 tablet (10 mg) by mouth daily.   polyethylene glycol (GOLYTELY) 236 g suspension   No No   Sig: The night before the exam at 6 pm drink an 8-ounce glass every 15 minutes until the jug is half empty. If you arrive before 11 AM: Drink the other half of the Golytely jug at 11 PM night before procedure. If you arrive after 11 AM: Drink the other half of the Golytely jug at 6 AM day of procedure. For additional instructions refer to your colonoscopy prep instructions.      Facility-Administered Medications: None       Patient Active Problem List   Diagnosis    OCP (oral contraceptive pills) initiation    Lung nodule    Pelvic pain in female    Migraine    Moderate persistent asthma with acute exacerbation    Oral herpes    Seborrheic dermatitis    Recurrent major depressive disorder, in partial remission    PTSD (post-traumatic stress disorder)    Ostiomeatal complex obstruction of paranasal sinus    Beverley bullosa    Chronic maxillary sinusitis    Nasal turbinate hypertrophy    Depression    Asthma in adult    History of gestational hypertension    Recurrent major depressive disorder    Abnormal Pap smear of cervix    Chlamydia infection        Past Medical History:   Diagnosis Date    Acute respiratory failure with hypoxia and hypercarbia (H) 02/14/2021    Asthma     Hypokalemia 03/05/2019    Migraine with aura     PTSD (post-traumatic stress disorder) 04/14/2021    Respiratory acidosis 02/14/2021    Right tubo-ovarian abscess 04/09/2020    Seasonal allergies     Sleep apnea         Past Surgical History:   Procedure Laterality Date    ENDOSCOPIC POLYPECTOMY NASAL Bilateral 4/3/2023    Procedure: POLYPECTOMY, NASAL CAVITY, ENDOSCOPIC WITH BEVERLEY BULLOSA RESECTION TURBINOPLASTY, ENDOSCOPIC;  Surgeon: Rudy  "Amaya Herrmann MD;  Location: [facility]    TURBINATE RESECTION Bilateral 4/3/2023    Procedure: TURBINOPLASTY, ENDOSCOPIC;  Surgeon: Amaya Grant MD;  Location: [facility] OR       Social History     Tobacco Use    Smoking status: Never    Smokeless tobacco: Never   Substance Use Topics    Alcohol use: No       Family History   Problem Relation Age of Onset    Gastrointestinal Disease Mother         familial polyposis coli, hemicolectomy    Colon Cancer Mother     Hypertension Mother     Diabetes Mother     Gastrointestinal Disease Maternal Grandfather         familial polyposis coli    Diabetes Other         Aunt     Cancer No family hx of     Heart Disease No family hx of        REVIEW OF SYSTEMS:     5 point ROS negative except as noted above in HPI, including Gen., Resp., CV, GI &  system review.      PHYSICAL EXAM:   There were no vitals taken for this visit. Estimated body mass index is 23.24 kg/m  as calculated from the following:    Height as of 3/17/25: 1.676 m (5' 6\").    Weight as of 3/17/25: 65.3 kg (144 lb).   GENERAL APPEARANCE: healthy, alert, and no distress  MENTAL STATUS: alert  AIRWAY EXAM: Mallampatti Class I (visualization of the soft palate, fauces, uvula, anterior and posterior pillars)  RESP: lungs clear to auscultation - no rales, rhonchi or wheezes  CV: normal S1 S2, no S3 or S4      DIAGNOSTICS:    Not indicated      IMPRESSION   ASA Class 2 - Mild systemic disease        PLAN:       Plan for colonoscopy. We discussed the risks, benefits and alternatives and the patient wished to proceed.    The above has been forwarded to the consulting provider.      Signed Electronically by: Chano Call MD,MD  March 24, 2025      [facility]  Ph: [phone] Fax: [phone]    "

## 2025-04-17 ENCOUNTER — TELEPHONE (OUTPATIENT)
Dept: FAMILY MEDICINE | Facility: CLINIC | Age: 35
End: 2025-04-17
Payer: COMMERCIAL

## 2025-04-17 NOTE — TELEPHONE ENCOUNTER
Patient Quality Outreach    Patient is due for the following:   Asthma  -  Asthma follow-up visit    Action(s) Taken:   Schedule a office visit for Asthma on 04/25/25    Type of outreach:    Phone, spoke to patient/parent. Already had appt on 04/25/25    Questions for provider review:    None         Azar Espinoza  Chart routed to None.

## 2025-04-20 ENCOUNTER — MYC REFILL (OUTPATIENT)
Dept: FAMILY MEDICINE | Facility: CLINIC | Age: 35
End: 2025-04-20
Payer: COMMERCIAL

## 2025-04-20 DIAGNOSIS — J45.40 MODERATE PERSISTENT ASTHMA, UNSPECIFIED WHETHER COMPLICATED: ICD-10-CM

## 2025-04-21 RX ORDER — ALBUTEROL SULFATE 0.83 MG/ML
SOLUTION RESPIRATORY (INHALATION)
Qty: 75 ML | Refills: 1 | Status: SHIPPED | OUTPATIENT
Start: 2025-04-21

## 2025-04-21 NOTE — TELEPHONE ENCOUNTER
albuterol (PROVENTIL) (2.5 MG/3ML) 0.083% neb solution         Sig: USE 1 VIAL VIA NEBULIZER EVERY 4 HOURS AS NEEDED FOR WHEEZING OR SHORTNESS OF BREATH    Disp: 75 mL    Refills: 1    Start: 4/20/2025    Class: E-Prescribe    Non-formulary For: Moderate persistent asthma, unspecified whether complicated    Last ordered: 2 months ago (2/20/2025) by Cammie Bond MD    Patient comment: Need a refill    Short-Acting Beta Agonist Inhalers Protocol  [id number]/2025 04:55 PM   Protocol Details Asthma control assessment score within normal limits in last 6 months             Tod Cast, RN, MSN

## 2025-04-25 ENCOUNTER — OFFICE VISIT (OUTPATIENT)
Dept: FAMILY MEDICINE | Facility: CLINIC | Age: 35
End: 2025-04-25
Payer: COMMERCIAL

## 2025-04-25 VITALS
HEART RATE: 68 BPM | DIASTOLIC BLOOD PRESSURE: 80 MMHG | BODY MASS INDEX: 23.33 KG/M2 | TEMPERATURE: 98.2 F | SYSTOLIC BLOOD PRESSURE: 120 MMHG | HEIGHT: 66 IN | WEIGHT: 145.2 LBS | RESPIRATION RATE: 20 BRPM | OXYGEN SATURATION: 100 %

## 2025-04-25 DIAGNOSIS — J45.909 ASTHMA IN ADULT, UNSPECIFIED ASTHMA SEVERITY, UNSPECIFIED WHETHER COMPLICATED, UNSPECIFIED WHETHER PERSISTENT: ICD-10-CM

## 2025-04-25 DIAGNOSIS — N92.0 MENORRHAGIA WITH REGULAR CYCLE: ICD-10-CM

## 2025-04-25 DIAGNOSIS — L30.8 OTHER ECZEMA: ICD-10-CM

## 2025-04-25 DIAGNOSIS — F43.21 ADJUSTMENT DISORDER WITH DEPRESSED MOOD: ICD-10-CM

## 2025-04-25 DIAGNOSIS — J45.40 MODERATE PERSISTENT ASTHMA, UNSPECIFIED WHETHER COMPLICATED: ICD-10-CM

## 2025-04-25 DIAGNOSIS — Z11.3 SCREEN FOR STD (SEXUALLY TRANSMITTED DISEASE): Primary | ICD-10-CM

## 2025-04-25 DIAGNOSIS — A59.9 TRICHOMONAS INFECTION: ICD-10-CM

## 2025-04-25 LAB
CLUE CELLS: ABNORMAL
HBV SURFACE AG SERPL QL IA: NONREACTIVE
HCG UR QL: NEGATIVE
HCV AB SERPL QL IA: NONREACTIVE
HIV 1+2 AB+HIV1 P24 AG SERPL QL IA: NONREACTIVE
TRICHOMONAS, WET PREP: PRESENT
WBC'S/HIGH POWER FIELD, WET PREP: ABNORMAL
YEAST, WET PREP: ABNORMAL

## 2025-04-25 PROCEDURE — 87491 CHLMYD TRACH DNA AMP PROBE: CPT | Performed by: STUDENT IN AN ORGANIZED HEALTH CARE EDUCATION/TRAINING PROGRAM

## 2025-04-25 PROCEDURE — 36415 COLL VENOUS BLD VENIPUNCTURE: CPT | Performed by: STUDENT IN AN ORGANIZED HEALTH CARE EDUCATION/TRAINING PROGRAM

## 2025-04-25 PROCEDURE — 86803 HEPATITIS C AB TEST: CPT | Performed by: STUDENT IN AN ORGANIZED HEALTH CARE EDUCATION/TRAINING PROGRAM

## 2025-04-25 PROCEDURE — 87210 SMEAR WET MOUNT SALINE/INK: CPT | Performed by: STUDENT IN AN ORGANIZED HEALTH CARE EDUCATION/TRAINING PROGRAM

## 2025-04-25 PROCEDURE — 96372 THER/PROPH/DIAG INJ SC/IM: CPT | Performed by: STUDENT IN AN ORGANIZED HEALTH CARE EDUCATION/TRAINING PROGRAM

## 2025-04-25 PROCEDURE — 81025 URINE PREGNANCY TEST: CPT | Performed by: STUDENT IN AN ORGANIZED HEALTH CARE EDUCATION/TRAINING PROGRAM

## 2025-04-25 PROCEDURE — G2211 COMPLEX E/M VISIT ADD ON: HCPCS | Performed by: STUDENT IN AN ORGANIZED HEALTH CARE EDUCATION/TRAINING PROGRAM

## 2025-04-25 PROCEDURE — 99214 OFFICE O/P EST MOD 30 MIN: CPT | Mod: 25 | Performed by: STUDENT IN AN ORGANIZED HEALTH CARE EDUCATION/TRAINING PROGRAM

## 2025-04-25 PROCEDURE — 87340 HEPATITIS B SURFACE AG IA: CPT | Performed by: STUDENT IN AN ORGANIZED HEALTH CARE EDUCATION/TRAINING PROGRAM

## 2025-04-25 PROCEDURE — 87591 N.GONORRHOEAE DNA AMP PROB: CPT | Performed by: STUDENT IN AN ORGANIZED HEALTH CARE EDUCATION/TRAINING PROGRAM

## 2025-04-25 PROCEDURE — 87389 HIV-1 AG W/HIV-1&-2 AB AG IA: CPT | Performed by: STUDENT IN AN ORGANIZED HEALTH CARE EDUCATION/TRAINING PROGRAM

## 2025-04-25 RX ORDER — MEDROXYPROGESTERONE ACETATE 150 MG/ML
150 INJECTION, SUSPENSION INTRAMUSCULAR
Status: ACTIVE | OUTPATIENT
Start: 2025-04-25 | End: 2026-04-20

## 2025-04-25 RX ORDER — CLOBETASOL PROPIONATE 0.5 MG/ML
SOLUTION TOPICAL 2 TIMES DAILY
Qty: 50 ML | Refills: 2 | Status: SHIPPED | OUTPATIENT
Start: 2025-04-25

## 2025-04-25 RX ORDER — ALBUTEROL SULFATE 0.83 MG/ML
SOLUTION RESPIRATORY (INHALATION)
Qty: 150 ML | Refills: 1 | Status: SHIPPED | OUTPATIENT
Start: 2025-04-25

## 2025-04-25 RX ORDER — BUDESONIDE AND FORMOTEROL FUMARATE DIHYDRATE 160; 4.5 UG/1; UG/1
AEROSOL RESPIRATORY (INHALATION)
Qty: 20.4 G | Refills: 11 | Status: SHIPPED | OUTPATIENT
Start: 2025-04-25

## 2025-04-25 RX ORDER — LORATADINE 10 MG/1
10 TABLET ORAL DAILY
Qty: 90 TABLET | Refills: 3 | Status: SHIPPED | OUTPATIENT
Start: 2025-04-25

## 2025-04-25 RX ORDER — MIRTAZAPINE 15 MG/1
15 TABLET, FILM COATED ORAL AT BEDTIME
Qty: 30 TABLET | Refills: 3 | Status: SHIPPED | OUTPATIENT
Start: 2025-04-25

## 2025-04-25 RX ADMIN — MEDROXYPROGESTERONE ACETATE 150 MG: 150 INJECTION, SUSPENSION INTRAMUSCULAR at 09:34

## 2025-04-25 ASSESSMENT — ASTHMA QUESTIONNAIRES: ACT_TOTALSCORE: 11

## 2025-04-25 ASSESSMENT — PATIENT HEALTH QUESTIONNAIRE - PHQ9: SUM OF ALL RESPONSES TO PHQ QUESTIONS 1-9: 16

## 2025-04-25 NOTE — PROGRESS NOTES
Clinic Administered Medication Documentation        Patient was given Depo Provera. Prior to medication administration, verified patient's identity using patient s name and date of birth. Please see MAR and medication order for additional information. Patient instructed to remain in clinic for 15 minutes and report any adverse reaction to staff immediately.    Vial/Syringe: Single dose vial. Was entire vial of medication used? Yes    NEXT INJECTION DUE: 7/11/25 - 8/8/25    Name of provider who requested the medication administration: Dr. Bond  Name of provider on site (faculty or community preceptor) at the time of performing the medication administration: Dr. Bond    Date of next administration: 07/11/25-08/08/25  Date of next office visit with provider to renew medication plan (must be seen annually): 04/25/26

## 2025-04-25 NOTE — PATIENT INSTRUCTIONS
Symbicort:  2 puff 2x per day and 2 puffs as needed up to 12 puffs.    Use nebs as needed  Restart your allergy medications.     Check for infection  Pregnancy test  Start Depo Shot.      Start taking mirtazepine 1 pill daily at night.   Find someone to talk to.

## 2025-04-25 NOTE — LETTER
April 28, 2025      Donna Larose  [address]  [place] [address]        Dear ,    We are writing to inform you of your test results.    Your vaginal swab came back positive for trichomonas.  This is a sexually transmitted infection that we can treat effectively with antibiotics.  I have sent in a prescription for metronidazole to treat this.  Please take 1 pill 2x daily for 1 week.  The rest of your tests are negative.  Please call the clinic at [phone] if you have any questions.       Resulted Orders   Chlamydia trachomatis/Neisseria gonorrhoeae by PCR - Clinic Collect   Result Value Ref Range    Chlamydia Trachomatis Negative Negative      Comment:      Negative for C. trachomatis rRNA by transcription mediated amplification.   A negative result by transcription mediated amplification does not preclude the presence of infection because results are dependent on proper and adequate collection, absence of inhibitors and sufficient rRNA to be detected.    Neisseria gonorrhoeae Negative Negative      Comment:      Negative for N. gonorrhoeae rRNA by transcription mediated amplification. A negative result by transcription mediated amplification does not preclude the presence of C. trachomatis infection because results are dependent on proper and adequate collection, absence of inhibitors and sufficient rRNA to be detected.    CTNG Specimen Source Urine, Voided    Wet preparation   Result Value Ref Range    Trichomonas Present (A) Absent    Yeast Absent Absent    Clue Cells Absent Absent    WBCs/high power field 3+ (A) None    Narrative    Many bacteria; negative odor    HCG qualitative urine   Result Value Ref Range    hCG Urine Qualitative Negative Negative      Comment:      This test is for screening purposes.  Results should be interpreted along with the clinical picture.  Confirmation testing is available if warranted by ordering [id number], HCG Quantitative Pregnancy.   Hepatitis C  Screen Reflex to HCV RNA Quant and Genotype   Result Value Ref Range    Hepatitis C Antibody Nonreactive Nonreactive      Comment:      A nonreactive screening test result does not exclude the possibility of exposure to or infection with HCV. Nonreactive screening test results in individuals with prior exposure to HCV may be due to antibody levels below the limit of detection of this assay or lack of reactivity to the HCV antigens used in this assay. Patients with recent HCV infections (<3 months from time of exposure) may have false-negative HCV antibody results due to the time needed for seroconversion (average of 8 to 9 weeks).   Hepatitis B surface antigen   Result Value Ref Range    Hepatitis B Surface Antigen Nonreactive Nonreactive   HIV Antigen Antibody Combo Cascade   Result Value Ref Range    HIV Antigen Antibody Combo Nonreactive Nonreactive      Comment:      Negative HIV-1 p24 antigen and HIV-1/2 antibody screening test results usually indicate the absence of HIV-1 and HIV-2 infection. However, such negative results do not rule-out acute HIV infection.  If acute HIV-1 or HIV-2 infection is suspected, detection of HIV-1 or HIV-2 RNA  is recommended. This result is obtained using the Roche Elecsys HIV Duo method on the bekah [id number] immunoassay analyzer.       If you have any questions or concerns, please call the clinic at the number listed above.       Sincerely,      Cammie Bond MD    Electronically signed

## 2025-04-25 NOTE — PROGRESS NOTES
Sent Disability parking Certificate application to:  [place] [employer]  [address] [place] [address]    Azar Espinoza MA
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
## 2025-04-26 LAB
C TRACH DNA SPEC QL PROBE+SIG AMP: NEGATIVE
N GONORRHOEA DNA SPEC QL NAA+PROBE: NEGATIVE
SPECIMEN TYPE: NORMAL

## 2025-04-28 RX ORDER — METRONIDAZOLE 500 MG/1
500 TABLET ORAL 2 TIMES DAILY
Qty: 14 TABLET | Refills: 0 | Status: SHIPPED | OUTPATIENT
Start: 2025-04-28 | End: 2025-05-05

## 2025-05-13 ENCOUNTER — DOCUMENTATION ONLY (OUTPATIENT)
Dept: FAMILY MEDICINE | Facility: CLINIC | Age: 35
End: 2025-05-13
Payer: COMMERCIAL

## 2025-05-13 NOTE — PROGRESS NOTES
To be completed in Nursing note:    Please reference list for forms that require a visit for completion.  Please remind patients that providers are given 3-5 business days to complete and return forms.      Form type: [place] [employer]    Date form received: 25    Date form completed by Physician:25    How was form returned to patient (mailed, faxed, or at  for patient to ):   Mail in the original to [place] of [employer] at:   and Vehicle Services  [address] [place] [address], [address]  [place] [address]    Date form mailed/faxed/left at  for patient and sent to HIM for scannin25, sent an copy to HIM for scanning      Once form is left for patient, faxed, or mailed [facility] will then close the documentation only encounter.     Azar Espinoza MA

## 2025-05-27 ENCOUNTER — OFFICE VISIT (OUTPATIENT)
Dept: FAMILY MEDICINE | Facility: CLINIC | Age: 35
End: 2025-05-27
Payer: COMMERCIAL

## 2025-05-27 ENCOUNTER — RESULTS FOLLOW-UP (OUTPATIENT)
Dept: FAMILY MEDICINE | Facility: CLINIC | Age: 35
End: 2025-05-27

## 2025-05-27 VITALS
SYSTOLIC BLOOD PRESSURE: 121 MMHG | HEART RATE: 76 BPM | HEIGHT: 66 IN | TEMPERATURE: 98.3 F | BODY MASS INDEX: 23.85 KG/M2 | RESPIRATION RATE: 20 BRPM | DIASTOLIC BLOOD PRESSURE: 82 MMHG | OXYGEN SATURATION: 100 % | WEIGHT: 148.4 LBS

## 2025-05-27 DIAGNOSIS — Z12.4 CERVICAL CANCER SCREENING: ICD-10-CM

## 2025-05-27 DIAGNOSIS — F33.1 MODERATE RECURRENT MAJOR DEPRESSION (H): ICD-10-CM

## 2025-05-27 DIAGNOSIS — J45.909 ASTHMA IN ADULT, UNSPECIFIED ASTHMA SEVERITY, UNSPECIFIED WHETHER COMPLICATED, UNSPECIFIED WHETHER PERSISTENT: Primary | ICD-10-CM

## 2025-05-27 DIAGNOSIS — J02.9 SORE THROAT: ICD-10-CM

## 2025-05-27 DIAGNOSIS — J45.51 SEVERE PERSISTENT ASTHMA WITH ACUTE EXACERBATION (H): ICD-10-CM

## 2025-05-27 DIAGNOSIS — J30.2 SEASONAL ALLERGIC RHINITIS, UNSPECIFIED TRIGGER: ICD-10-CM

## 2025-05-27 LAB
CLUE CELLS: ABNORMAL
DEPRECATED S PYO AG THROAT QL EIA: NEGATIVE
S PYO DNA THROAT QL NAA+PROBE: NOT DETECTED
TRICHOMONAS, WET PREP: ABNORMAL
WBC'S/HIGH POWER FIELD, WET PREP: ABNORMAL
YEAST, WET PREP: ABNORMAL

## 2025-05-27 RX ORDER — FLUTICASONE PROPIONATE 50 MCG
1 SPRAY, SUSPENSION (ML) NASAL DAILY
Qty: 15.8 ML | Refills: 2 | Status: SHIPPED | OUTPATIENT
Start: 2025-05-27

## 2025-05-27 RX ORDER — LORATADINE 10 MG/1
10 TABLET ORAL DAILY
Qty: 90 TABLET | Refills: 3 | Status: SHIPPED | OUTPATIENT
Start: 2025-05-27

## 2025-05-27 ASSESSMENT — ASTHMA QUESTIONNAIRES
QUESTION_4 LAST FOUR WEEKS HOW OFTEN HAVE YOU USED YOUR RESCUE INHALER OR NEBULIZER MEDICATION (SUCH AS ALBUTEROL): TWO OR THREE TIMES PER WEEK
QUESTION_5 LAST FOUR WEEKS HOW WOULD YOU RATE YOUR ASTHMA CONTROL: SOMEWHAT CONTROLLED
QUESTION_1 LAST FOUR WEEKS HOW MUCH OF THE TIME DID YOUR ASTHMA KEEP YOU FROM GETTING AS MUCH DONE AT WORK, SCHOOL OR AT HOME: SOME OF THE TIME
ACT_TOTALSCORE: 14
QUESTION_2 LAST FOUR WEEKS HOW OFTEN HAVE YOU HAD SHORTNESS OF BREATH: THREE TO SIX TIMES A WEEK
QUESTION_3 LAST FOUR WEEKS HOW OFTEN DID YOUR ASTHMA SYMPTOMS (WHEEZING, COUGHING, SHORTNESS OF BREATH, CHEST TIGHTNESS OR PAIN) WAKE YOU UP AT NIGHT OR EARLIER THAN USUAL IN THE MORNING: TWO OR THREE NIGHTS A WEEK

## 2025-05-27 ASSESSMENT — PATIENT HEALTH QUESTIONNAIRE - PHQ9
10. IF YOU CHECKED OFF ANY PROBLEMS, HOW DIFFICULT HAVE THESE PROBLEMS MADE IT FOR YOU TO DO YOUR WORK, TAKE CARE OF THINGS AT HOME, OR GET ALONG WITH OTHER PEOPLE: SOMEWHAT DIFFICULT
SUM OF ALL RESPONSES TO PHQ QUESTIONS 1-9: 9
SUM OF ALL RESPONSES TO PHQ QUESTIONS 1-9: 9

## 2025-05-27 NOTE — LETTER
"May 30, 2025      Donna BUCIO Larose  [address]  [facility] [address]        Dear ,    We are writing to inform you of your test results.    Thank you for doing your pap smear at your last visit.  Your results came back showing \"atypical cells of undetermined significance\".  This is nothing to worry about - it means that the cells are a little different, but don't look like cancer.  Your HPV testing is normal.  We should repeat your pap smear in 3 years.  All of your STD testing is normal.  Please call the clinic at [phone] if you have any questions.     Resulted Orders   HPV and Gynecologic Cytology Panel - Recommended Age 30 - 65 Years   Result Value Ref Range    Human Papilloma Virus 16 DNA Negative Negative    Human Papilloma Virus 18 DNA Negative Negative    Human Papilloma Virus Other Negative Negative    FINAL DIAGNOSIS       This patient's sample is negative for high risk HPV DNA.          METHODOLOGY: The [employer] system uses automated extraction, simultaneous amplification of HPV (E6/E7 oncogenes) and beta-globin, followed by real time detection of fluorescent labeled HPV and beta globin using specific oligonucleotide probes. The test specifically identifies types HPV 16 DNA and HPV 18 DNA while concurrently detecting the rest of the high risk types (31, 33, 35, 39, 45, 51, 52, 56, 58, 59, 66 or 68).     COMMENTS: This test is not intended for use as a screening device for woman under age 30 with normal cervical cytology. Results should be correlated with cytologic and histologic findings. Close clinical follow up is recommended.      Please see the separate Gynecologic Cytology (Pap) report from the same collection date.     Streptococcus A Rapid Screen w/Reflex to PCR - Clinic Collect   Result Value Ref Range    Group A Strep antigen Negative Negative   Wet preparation   Result Value Ref Range    Trichomonas Absent Absent    Yeast Absent Absent    Clue Cells Absent Absent    " WBCs/high power field 1+ (A) None    Narrative    Few bacteria   Chlamydia trachomatis/Neisseria gonorrhoeae by PCR - Clinic Collect   Result Value Ref Range    Chlamydia Trachomatis Negative Negative      Comment:      Negative for C. trachomatis rRNA by transcription mediated amplification.   A negative result by transcription mediated amplification does not preclude the presence of infection because results are dependent on proper and adequate collection, absence of inhibitors and sufficient rRNA to be detected.    Neisseria gonorrhoeae Negative Negative      Comment:      Negative for N. gonorrhoeae rRNA by transcription mediated amplification. A negative result by transcription mediated amplification does not preclude the presence of C. trachomatis infection because results are dependent on proper and adequate collection, absence of inhibitors and sufficient rRNA to be detected.    CTNG Specimen Source Cervix    Group A Streptococcus PCR Throat Swab   Result Value Ref Range    Group A strep by PCR Not Detected Not Detected    Narrative    The Xpert Xpress Strep A test, performed on the [employer], is a rapid, qualitative in vitro diagnostic test for the detection of Streptococcus pyogenes (Group A ß-hemolytic Streptococcus, Strep A) in throat swab specimens from patients with signs and symptoms of pharyngitis. The Xpert Xpress Strep A test can be used as an aid in the diagnosis of Group A Streptococcal pharyngitis. The assay is not intended to monitor treatment for Group A Streptococcus infections. The Xpert Xpress Strep A test utilizes an automated real-time polymerase chain reaction (PCR) to detect Streptococcus pyogenes DNA.   Gynecologic Cytology (PAP)   Result Value Ref Range    Interpretation        Negative for Intraepithelial Lesion or Malignancy (NILM)    Comment         Papanicolaou Test Limitations:  Cervical cytology is a screening test with limited sensitivity, and regular  screening is critical for cancer prevention.  Pap tests are primarily effective for the diagnosis/prevention of squamous cell carcinoma, not adenocarcinoma or other cancers.        Specimen Adequacy       Satisfactory for evaluation, endocervical/transformation zone component present    Clinical Information       none      LMP/Menopause Date       5/7/2025      Previous Abnormal?       No      Performing Labs       The technical component of this testing was completed at [facility].    Stain controls for all stains resulted within this report have been reviewed and show appropriate reactivity.      Associated HPV Report       Please see the associated HPV High Risk Types DNA Cervical report for Specimen [id number] from the same collection date.         If you have any questions or concerns, please call the clinic at the number listed above.       Sincerely,      Cammie Bond MD    Electronically signed

## 2025-05-27 NOTE — PATIENT INSTRUCTIONS
Keep doing tea with honey and lemon.   Use ibuprofen for the sore throat.      For the allergies:  Start taking your loratidine.   Start using the flonase nasal spray.      Go back to using the Symbicort regularly, 2 puff in morning and 2 puffs in afternoon.      Thank you for doing your pap smear today.  Results should come back in 1 [place]

## 2025-05-27 NOTE — LETTER
2025    Donna Larose   1990        To Whom it May Concern;    Please excuse Donna Larose from work/school for a healthcare visit on May 27, 2025.    Sincerely,        Cammie Bond MD

## 2025-05-27 NOTE — PROGRESS NOTES
There are no exam notes on file for this visit.    ASSESSMENT AND PLAN:      Donna was seen today for recheck.    Diagnoses and all orders for this visit:    Asthma in adult, unspecified asthma severity, unspecified whether complicated, unspecified whether persistent  Seasonal allergic rhinitis, unspecified trigger  Severe persistent asthma with acute exacerbation (H)  Increased nasal congestion, cough and wheezing.  Likely secondary to viral URI.  Reviewed appropriate use of her Symbicort, and will start allergy medications with loratadine and fluticasone nasal spray.  Discussed conservative measures.  -     loratadine (CLARITIN) 10 MG tablet; Take 1 tablet (10 mg) by mouth daily.  -     fluticasone (FLONASE) 50 MCG/ACT nasal spray; Spray 1 spray into both nostrils daily.    Cervical cancer screening  She is due for her Pap smear.  No history of abnormal Pap smears.  Would like gonorrhea chlamydia testing and wet prep.  She recently tested positive for trichomonas in April.  -     HPV and Gynecologic Cytology Panel - Recommended Age 30 - 65 Years  -     Wet preparation  -     Chlamydia trachomatis/Neisseria gonorrhoeae by PCR - Clinic Collect  -     Gynecologic Cytology (PAP)    Sore throat  She is concerned about strep.  Not consistent with exam.  Strep test was negative.  -     Streptococcus A Rapid Screen w/Reflex to PCR - Clinic Collect  -     Group A Streptococcus PCR Throat Swab    Moderate recurrent major depression (H)  PHQ-9 improving.  Sleep better on the mirtazapine.  -Continue mirtazapine 15 mg daily.    The longitudinal plan of care for the diagnosis(es)/condition(s) as documented were addressed during this visit. Due to the added complexity in care, I will continue to support Donna in the subsequent management and with ongoing continuity of care.      Patient Instructions   Keep doing tea with honey and lemon.   Use ibuprofen for the sore throat.      For the allergies:  Start taking your  loratidine.   Start using the flonase nasal spray.      Go back to using the Symbicort regularly, 2 puff in morning and 2 puffs in afternoon.      Thank you for doing your pap smear today.  Results should come back in 1 [place]         Cammie Bnod MD    SUBJECTIVE  Donna Larose is a 34 year old female with past medical history significant for    Patient Active Problem List   Diagnosis    OCP (oral contraceptive pills) initiation    Lung nodule    Pelvic pain in female    Migraine    Moderate persistent asthma with acute exacerbation    Oral herpes    Seborrheic dermatitis    Recurrent major depressive disorder, in partial remission    PTSD (post-traumatic stress disorder)    Ostiomeatal complex obstruction of paranasal sinus    Aide bullosa    Chronic maxillary sinusitis    Nasal turbinate hypertrophy    Depression    Asthma in adult    History of gestational hypertension    Moderate recurrent major depression (H)    Abnormal Pap smear of cervix    Chlamydia infection    Severe persistent asthma with acute exacerbation (H)     Others present at the visit:  Patient's brother     Presents for   Chief Complaint   Patient presents with    RECHECK     Follow-up act, throat pain.  Wants to do test      Patient presents for asthma follow-up visit.  Overall she has been feeling sick over the last few days.  Initially felt worse, but symptoms are starting to get better.  She notes sinus congestion, sinus pain, cough, wheezing, and some fever and chills.  She has been using tea with Aceves's, honey, lemon, and this has been helping.  Yesterday things were way better.  She has been using her inhaler and this does help.  Does not have nebs at home so is not using them.  She notes increased shortness of breath especially with going up and down the stairs.  Also notes that she has had worsening allergy symptoms including watery eyes, nasal congestion, and sneezing.  Her 2 young boys are both sick.    Her mood has been  "slightly better.  Sleep is up and down but she does feel like the mirtazapine is helpful.    She is due for a Pap and would like this completed today.  Is also interested in repeating her STD testing.      OBJECTIVE:  Vitals: /82 (BP Location: Left arm, Patient Position: Sitting, Cuff Size: Adult Regular)   Pulse 76   Temp 98.3  F (36.8  C) (Oral)   Resp 20   Ht 1.676 m (5' 6\")   Wt 67.3 kg (148 lb 6.4 oz)   LMP 05/07/2025   SpO2 100%   BMI 23.95 kg/m    BMI= Body mass index is 23.95 kg/m .  Objective:    Vitals:  Vitals are reviewed and are within the normal range.  O2 sats are excellent.  Respiratory rate is normal.  Gen:  Alert, pleasant, no acute distress, no increased work of breathing  HEENT: Tympanic membrane's bilaterally are normal.  She does have some sinus tenderness, most prominent in the left maxillary sinus.  Bilateral nasal congestion enlarged erythematous turbinates, worse on the left than the right.  There is clear, nonerythematous, with some cobblestoning.  She has shotty bilateral anterior cervical lymphadenopathy and tonsillar lymphadenopathy.  Cardiac:  Regular rate and rhythm, no murmurs, rubs or gallops  Respiratory:  Lungs clear to auscultation bilaterally, occasional scattered wheezes  Abdomen:  Soft, non-tender, non-distended, bowel sounds positive        3/17/2025     3:55 PM 4/25/2025     9:21 AM 5/27/2025     9:03 AM   PHQ   PHQ-9 Total Score 6  16 9    Q9: Thoughts of better off dead/self-harm past 2 weeks Not at all Not at all Not at all       Patient-reported        Results for orders placed or performed in visit on 05/27/25   HPV and Gynecologic Cytology Panel - Recommended Age 30 - 65 Years     Status: None   Result Value Ref Range    Human Papilloma Virus 16 DNA Negative Negative    Human Papilloma Virus 18 DNA Negative Negative    Human Papilloma Virus Other Negative Negative    FINAL DIAGNOSIS       This patient's sample is negative for high risk HPV " DNA.          METHODOLOGY: The [employer] system uses automated extraction, simultaneous amplification of HPV (E6/E7 oncogenes) and beta-globin, followed by real time detection of fluorescent labeled HPV and beta globin using specific oligonucleotide probes. The test specifically identifies types HPV 16 DNA and HPV 18 DNA while concurrently detecting the rest of the high risk types (31, 33, 35, 39, 45, 51, 52, 56, 58, 59, 66 or 68).     COMMENTS: This test is not intended for use as a screening device for woman under age 30 with normal cervical cytology. Results should be correlated with cytologic and histologic findings. Close clinical follow up is recommended.      Please see the separate Gynecologic Cytology (Pap) report from the same collection date.     Streptococcus A Rapid Screen w/Reflex to PCR - Clinic Collect     Status: Normal    Specimen: Throat; Swab   Result Value Ref Range    Group A Strep antigen Negative Negative   Wet preparation     Status: Abnormal    Specimen: Urethra; Swab   Result Value Ref Range    Trichomonas Absent Absent    Yeast Absent Absent    Clue Cells Absent Absent    WBCs/high power field 1+ (A) None    Narrative    Few bacteria   Chlamydia trachomatis/Neisseria gonorrhoeae by PCR - Clinic Collect     Status: None    Specimen: Cervix; Swab   Result Value Ref Range    Chlamydia Trachomatis Negative Negative    Neisseria gonorrhoeae Negative Negative    CTNG Specimen Source Cervix    Group A Streptococcus PCR Throat Swab     Status: Normal    Specimen: Throat; Swab   Result Value Ref Range    Group A strep by PCR Not Detected Not Detected    Narrative    The Xpert Xpress Strep A test, performed on the [employer], is a rapid, qualitative in vitro diagnostic test for the detection of Streptococcus pyogenes (Group A ß-hemolytic Streptococcus, Strep A) in throat swab specimens from patients with signs and symptoms of pharyngitis. The Xpert Xpress Strep A test can be used as  an aid in the diagnosis of Group A Streptococcal pharyngitis. The assay is not intended to monitor treatment for Group A Streptococcus infections. The Xpert Xpress Strep A test utilizes an automated real-time polymerase chain reaction (PCR) to detect Streptococcus pyogenes DNA.           Patient Instructions   Keep doing tea with honey and lemon.   Use ibuprofen for the sore throat.      For the allergies:  Start taking your loratidine.   Start using the flonase nasal spray.      Go back to using the Symbicort regularly, 2 puff in morning and 2 puffs in afternoon.      Thank you for doing your pap smear today.  Results should come back in 1 [place]         Cammie Bond MD    Answers submitted by the patient for this visit:  Patient Health Questionnaire (Submitted on 5/27/2025)  If you checked off any problems, how difficult have these problems made it for you to do your work, take care of things at home, or get along with other people?: Somewhat difficult  PHQ9 TOTAL SCORE: 9

## 2025-05-27 NOTE — LETTER
May 28, 2025      Donna RUPINDER Thuan  [address]  [facility] [address]        Dear ,    We are writing to inform you of your test results.    Your wet prep test was negative for infection, including trichomonas.  This is good news.  Please call the clinic at [phone] if you have any questions.     Resulted Orders   Streptococcus A Rapid Screen w/Reflex to PCR - Clinic Collect   Result Value Ref Range    Group A Strep antigen Negative Negative   Wet preparation   Result Value Ref Range    Trichomonas Absent Absent    Yeast Absent Absent    Clue Cells Absent Absent    WBCs/high power field 1+ (A) None    Narrative    Few bacteria   Group A Streptococcus PCR Throat Swab   Result Value Ref Range    Group A strep by PCR Not Detected Not Detected    Narrative    The Xpert Xpress Strep A test, performed on the [employer], is a rapid, qualitative in vitro diagnostic test for the detection of Streptococcus pyogenes (Group A ß-hemolytic Streptococcus, Strep A) in throat swab specimens from patients with signs and symptoms of pharyngitis. The Xpert Xpress Strep A test can be used as an aid in the diagnosis of Group A Streptococcal pharyngitis. The assay is not intended to monitor treatment for Group A Streptococcus infections. The Xpert Xpress Strep A test utilizes an automated real-time polymerase chain reaction (PCR) to detect Streptococcus pyogenes DNA.       If you have any questions or concerns, please call the clinic at the number listed above.       Sincerely,      Cammie Bond MD    Electronically signed

## 2025-05-28 PROBLEM — F33.1 MODERATE RECURRENT MAJOR DEPRESSION (H): Status: ACTIVE | Noted: 2020-04-09

## 2025-05-28 PROBLEM — J45.51 SEVERE PERSISTENT ASTHMA WITH ACUTE EXACERBATION (H): Status: ACTIVE | Noted: 2025-05-28

## 2025-05-28 LAB
C TRACH DNA SPEC QL PROBE+SIG AMP: NEGATIVE
HPV HR 12 DNA CVX QL NAA+PROBE: NEGATIVE
HPV16 DNA CVX QL NAA+PROBE: NEGATIVE
HPV18 DNA CVX QL NAA+PROBE: NEGATIVE
HUMAN PAPILLOMA VIRUS FINAL DIAGNOSIS: NORMAL
N GONORRHOEA DNA SPEC QL NAA+PROBE: NEGATIVE
SPECIMEN TYPE: NORMAL

## 2025-05-28 NOTE — RESULT ENCOUNTER NOTE
Donna Larose-    Your wet prep test was negative for infection, including trichomonas.  This is good news.  Please call the clinic at [phone] if you have any questions.      Cammie Bond MD    Please send results to patient.

## 2025-05-29 LAB
BKR AP ASSOCIATED HPV REPORT: NORMAL
BKR LAB AP GYN ADEQUACY: NORMAL
BKR LAB AP GYN INTERPRETATION: NORMAL
BKR LAB AP LMP: NORMAL
BKR LAB AP PREVIOUS ABNORMAL: NORMAL
PATH REPORT.COMMENTS IMP SPEC: NORMAL
PATH REPORT.COMMENTS IMP SPEC: NORMAL
PATH REPORT.RELEVANT HX SPEC: NORMAL

## 2025-05-29 NOTE — RESULT ENCOUNTER NOTE
"Donna Larose-    Thank you for doing your pap smear at your last visit.  Your results came back showing \"atypical cells of undetermined significance\".  This is nothing to worry about - it means that the cells are a little different, but don't look like cancer.  Your HPV testing is normal.  We should repeat your pap smear in 3 years.  All of your STD testing is normal.  Please call the clinic at [phone] if you have any questions.      Cammie Bond MD    Please send results to patient.        "

## 2025-06-05 PROBLEM — R87.619 ABNORMAL PAP SMEAR OF CERVIX: Status: ACTIVE | Noted: 2024-11-27

## 2025-06-24 ENCOUNTER — OFFICE VISIT (OUTPATIENT)
Dept: FAMILY MEDICINE | Facility: CLINIC | Age: 35
End: 2025-06-24
Payer: COMMERCIAL

## 2025-06-24 VITALS
HEART RATE: 75 BPM | RESPIRATION RATE: 20 BRPM | BODY MASS INDEX: 24.72 KG/M2 | HEIGHT: 66 IN | WEIGHT: 153.8 LBS | OXYGEN SATURATION: 99 % | TEMPERATURE: 99.4 F | SYSTOLIC BLOOD PRESSURE: 122 MMHG | DIASTOLIC BLOOD PRESSURE: 81 MMHG

## 2025-06-24 DIAGNOSIS — F33.1 MODERATE RECURRENT MAJOR DEPRESSION (H): ICD-10-CM

## 2025-06-24 DIAGNOSIS — J45.40 MODERATE PERSISTENT ASTHMA, UNSPECIFIED WHETHER COMPLICATED: Primary | ICD-10-CM

## 2025-06-24 DIAGNOSIS — Z65.9 OTHER SOCIAL STRESSOR: ICD-10-CM

## 2025-06-24 DIAGNOSIS — F43.21 ADJUSTMENT DISORDER WITH DEPRESSED MOOD: ICD-10-CM

## 2025-06-24 RX ORDER — AZELASTINE 1 MG/ML
1 SPRAY, METERED NASAL 2 TIMES DAILY
Qty: 30 ML | Refills: 5 | Status: SHIPPED | OUTPATIENT
Start: 2025-06-24

## 2025-06-24 RX ORDER — BUDESONIDE AND FORMOTEROL FUMARATE DIHYDRATE 160; 4.5 UG/1; UG/1
AEROSOL RESPIRATORY (INHALATION)
Qty: 20.4 G | Refills: 11 | Status: SHIPPED | OUTPATIENT
Start: 2025-06-24

## 2025-06-24 ASSESSMENT — ASTHMA QUESTIONNAIRES: ACT_TOTALSCORE: 14

## 2025-06-24 NOTE — PROGRESS NOTES
There are no exam notes on file for this visit.    ASSESSMENT AND PLAN:      Donna was seen today for recheck.    Diagnoses and all orders for this visit:    Moderate persistent asthma, unspecified whether complicated  Some scattered wheezes on exam.  Using her Symbicort appropriately.  We will add Spiriva, and azelastine to help with allergy symptoms.  She will continue her other allergy medications.  -     azelastine (ASTELIN) 0.1 % nasal spray; Spray 1 spray into both nostrils 2 times daily.  -     tiotropium (SPIRIVA RESPIMAT) 1.25 MCG/ACT inhaler; Inhale 1 puff into the lungs daily.  -     budesonide-formoterol (SYMBICORT/BREYNA) 160-4.5 MCG/ACT inhaler; Inhale 2 puffs twice daily plus 1 puff as needed. May use up to 12 puffs per day.    Moderate recurrent major depression (H)  Adjustment disorder with depressed mood\  Overall mood is stable.  She does feel like the mirtazapine is helping and would like to continue to take this.  Feels stressed about social situation and housing.    Other social stressor  Housing remains a concern.  Working with , and has been approved for section 8.  Still needs to find a place to live however.  Encouraged her to consider reapplying for emergency assistance to help her until she can find a new place, as placement has been challenging for many.    The longitudinal plan of care for the diagnosis(es)/condition(s) as documented were addressed during this visit. Due to the added complexity in care, I will continue to support Donna in the subsequent management and with ongoing continuity of care.    Follow-up in 3 months.    Patient Instructions   Start taking the Spiriva, 1 puff 1x daily to help with asthma.   Add azelastine nose spray to help with allergies and asthma.     Continue with the Symbicort and the mirtazepine.          Cammie Bond MD    SUBJECTIVE  Donna RUPINDER Larose is a 34 year old female with past medical history significant for    Patient  "Active Problem List   Diagnosis    OCP (oral contraceptive pills) initiation    Lung nodule    Pelvic pain in female    Migraine    Moderate persistent asthma with acute exacerbation    Oral herpes    Seborrheic dermatitis    Recurrent major depressive disorder, in partial remission    PTSD (post-traumatic stress disorder)    Ostiomeatal complex obstruction of paranasal sinus    Aide bullosa    Chronic maxillary sinusitis    Nasal turbinate hypertrophy    Depression    Asthma in adult    History of gestational hypertension    Moderate recurrent major depression (H)    Abnormal Pap smear of cervix    Chlamydia infection    Severe persistent asthma with acute exacerbation (H)     Others present at the visit:  Patient's daughter    Presents for   Chief Complaint   Patient presents with    RECHECK     Follow-up asthma     Patient presents for asthma follow-up.  Patient had difficulty with breathing.  Worse with the change in weather and the heat.  Her allergies have been particularly bad.  She is using Symbicort, 2 puffs twice daily, and as needed.  She is also needing to use her nebulizer on most days.  Night coughing as well.  ACT score is 14.    Continues to have multiple stressors.  Biggest one right now is housing.  She is unable to afford her current housing.  Has already used emergency assistance in the past, but is interested in applying for this again.  She was recently approved for section 8, but she needs to find a place to live.  There is supposed to be a housing worker who has been helping with this, but she has been having trouble connecting with them.  Is feeling like she is struggling to keep up with her bills, and this is stressful.      OBJECTIVE:  Vitals: /81   Pulse 75   Temp 99.4  F (37.4  C) (Oral)   Resp 20   Ht 1.676 m (5' 6\")   Wt 69.8 kg (153 lb 12.8 oz)   LMP 05/07/2025   SpO2 99%   BMI 24.82 kg/m    BMI= Body mass index is 24.82 kg/m .  Objective:    Vitals:  Vitals are " reviewed and are within the normal range, present is appropriate  Gen:  Alert, pleasant, no acute distress  Cardiac:  Regular rate and rhythm, no murmurs, rubs or gallops  Respiratory: Scattered wheezes, most notable in upper lobe.  Clears with cough  Extremities:  Warm, well-perfused, pulses 2+/4, no lower extremity edema  Psych: Mood is mildly flat.  She seems worried, concerned, thought process and insight are appropriate, intact.        4/25/2025     9:21 AM 5/27/2025     9:04 AM 6/24/2025    11:14 AM   ACT Total Scores   ACT TOTAL SCORE (Goal Greater than or Equal to 20) 11 14  14   In the past 12 months, how many times did you visit the emergency room for your asthma without being admitted to the hospital? 0 0 0   In the past 12 months, how many times were you hospitalized overnight because of your asthma? 0 0 0       Patient-reported            3/17/2025     3:55 PM 4/25/2025     9:21 AM 5/27/2025     9:03 AM   PHQ   PHQ-9 Total Score 6  16 9    Q9: Thoughts of better off dead/self-harm past 2 weeks Not at all Not at all Not at all       Patient-reported          11/15/2022     3:53 PM 2/16/2023     3:06 PM 3/29/2023    11:45 AM   MICHAEL-7 SCORE   Total Score  10 (moderate anxiety)    Total Score 10 10 1       Patient Instructions   Start taking the Spiriva, 1 puff 1x daily to help with asthma.   Add azelastine nose spray to help with allergies and asthma.     Continue with the Symbicort and the mirtazepine.          Cammie Bond MD

## 2025-06-24 NOTE — PATIENT INSTRUCTIONS
Start taking the Spiriva, 1 puff 1x daily to help with asthma.   Add azelastine nose spray to help with allergies and asthma.     Continue with the Symbicort and the mirtazepine.